# Patient Record
Sex: FEMALE | Race: WHITE | NOT HISPANIC OR LATINO | Employment: UNEMPLOYED | RURAL
[De-identification: names, ages, dates, MRNs, and addresses within clinical notes are randomized per-mention and may not be internally consistent; named-entity substitution may affect disease eponyms.]

---

## 2017-02-16 RX ORDER — AMITRIPTYLINE HYDROCHLORIDE 25 MG/1
25 TABLET, FILM COATED ORAL NIGHTLY
Qty: 90 TABLET | Refills: 1 | Status: SHIPPED | OUTPATIENT
Start: 2017-02-16 | End: 2017-08-07 | Stop reason: SDUPTHER

## 2017-02-16 RX ORDER — PROPRANOLOL HYDROCHLORIDE 40 MG/1
40 TABLET ORAL DAILY
Qty: 90 TABLET | Refills: 1 | Status: SHIPPED | OUTPATIENT
Start: 2017-02-16 | End: 2017-08-07 | Stop reason: SDUPTHER

## 2017-03-27 RX ORDER — PREDNISONE 10 MG/1
10 TABLET ORAL DAILY
Qty: 5 TABLET | Refills: 0 | Status: SHIPPED | OUTPATIENT
Start: 2017-03-27 | End: 2017-04-17 | Stop reason: SDUPTHER

## 2017-03-27 RX ORDER — AZITHROMYCIN 250 MG/1
TABLET, FILM COATED ORAL
Qty: 6 TABLET | Refills: 0 | Status: SHIPPED | OUTPATIENT
Start: 2017-03-27 | End: 2017-04-17 | Stop reason: SDUPTHER

## 2017-04-14 DIAGNOSIS — Z13.1 ENCOUNTER FOR SCREENING FOR DIABETES MELLITUS: Primary | ICD-10-CM

## 2017-04-14 DIAGNOSIS — R07.9 CHEST PAIN, UNSPECIFIED TYPE: Primary | ICD-10-CM

## 2017-04-14 PROCEDURE — 93010 ELECTROCARDIOGRAM REPORT: CPT | Performed by: INTERNAL MEDICINE

## 2017-04-14 PROCEDURE — 93005 ELECTROCARDIOGRAM TRACING: CPT | Performed by: FAMILY MEDICINE

## 2017-04-14 RX ORDER — LISINOPRIL AND HYDROCHLOROTHIAZIDE 12.5; 1 MG/1; MG/1
1 TABLET ORAL DAILY
Qty: 30 TABLET | Refills: 11 | Status: SHIPPED | OUTPATIENT
Start: 2017-04-14 | End: 2017-05-03

## 2017-04-17 LAB
ALBUMIN SERPL-MCNC: 4 G/DL (ref 3.4–4.8)
ALBUMIN/GLOB SERPL: 1.4 G/DL (ref 1.1–1.8)
ALP SERPL-CCNC: 119 U/L (ref 38–126)
ALT SERPL W P-5'-P-CCNC: 22 U/L (ref 9–52)
ANION GAP SERPL CALCULATED.3IONS-SCNC: 9 MMOL/L (ref 5–15)
ARTICHOKE IGE QN: 128 MG/DL (ref 1–129)
AST SERPL-CCNC: 17 U/L (ref 14–36)
BASOPHILS # BLD AUTO: 0.03 10*3/MM3 (ref 0–0.2)
BASOPHILS NFR BLD AUTO: 0.2 % (ref 0–2)
BILIRUB SERPL-MCNC: 0.5 MG/DL (ref 0.2–1.3)
BUN BLD-MCNC: 16 MG/DL (ref 7–21)
BUN/CREAT SERPL: 18.4 (ref 7–25)
CALCIUM SPEC-SCNC: 9.5 MG/DL (ref 8.4–10.2)
CHLORIDE SERPL-SCNC: 98 MMOL/L (ref 95–110)
CHOLEST SERPL-MCNC: 218 MG/DL (ref 0–199)
CO2 SERPL-SCNC: 28 MMOL/L (ref 22–31)
CREAT BLD-MCNC: 0.87 MG/DL (ref 0.5–1)
DEPRECATED RDW RBC AUTO: 46.8 FL (ref 36.4–46.3)
EOSINOPHIL # BLD AUTO: 0.15 10*3/MM3 (ref 0–0.7)
EOSINOPHIL NFR BLD AUTO: 1.1 % (ref 0–7)
ERYTHROCYTE [DISTWIDTH] IN BLOOD BY AUTOMATED COUNT: 13.8 % (ref 11.5–14.5)
GFR SERPL CREATININE-BSD FRML MDRD: 66 ML/MIN/1.73 (ref 45–104)
GLOBULIN UR ELPH-MCNC: 2.8 GM/DL (ref 2.3–3.5)
GLUCOSE BLD-MCNC: 98 MG/DL (ref 60–100)
HBA1C MFR BLD: 4.81 % (ref 4–5.6)
HCT VFR BLD AUTO: 39.8 % (ref 35–45)
HDLC SERPL-MCNC: 53 MG/DL (ref 60–200)
HGB BLD-MCNC: 13.4 G/DL (ref 12–15.5)
IMM GRANULOCYTES # BLD: 0.02 10*3/MM3 (ref 0–0.02)
IMM GRANULOCYTES NFR BLD: 0.2 % (ref 0–0.5)
LDLC/HDLC SERPL: 2.71 {RATIO} (ref 0–3.22)
LYMPHOCYTES # BLD AUTO: 3.03 10*3/MM3 (ref 0.6–4.2)
LYMPHOCYTES NFR BLD AUTO: 23.1 % (ref 10–50)
MCH RBC QN AUTO: 31.2 PG (ref 26.5–34)
MCHC RBC AUTO-ENTMCNC: 33.7 G/DL (ref 31.4–36)
MCV RBC AUTO: 92.6 FL (ref 80–98)
MONOCYTES # BLD AUTO: 1.22 10*3/MM3 (ref 0–0.9)
MONOCYTES NFR BLD AUTO: 9.3 % (ref 0–12)
NEUTROPHILS # BLD AUTO: 8.64 10*3/MM3 (ref 2–8.6)
NEUTROPHILS NFR BLD AUTO: 66.1 % (ref 37–80)
PLATELET # BLD AUTO: 312 10*3/MM3 (ref 150–450)
PMV BLD AUTO: 10.1 FL (ref 8–12)
POTASSIUM BLD-SCNC: 4.3 MMOL/L (ref 3.5–5.1)
PROT SERPL-MCNC: 6.8 G/DL (ref 6.3–8.6)
RBC # BLD AUTO: 4.3 10*6/MM3 (ref 3.77–5.16)
SODIUM BLD-SCNC: 135 MMOL/L (ref 137–145)
TRIGL SERPL-MCNC: 106 MG/DL (ref 20–199)
TSH SERPL DL<=0.05 MIU/L-ACNC: 1.24 MIU/ML (ref 0.46–4.68)
WBC NRBC COR # BLD: 13.09 10*3/MM3 (ref 3.2–9.8)

## 2017-04-17 PROCEDURE — 85025 COMPLETE CBC W/AUTO DIFF WBC: CPT | Performed by: FAMILY MEDICINE

## 2017-04-17 PROCEDURE — 83036 HEMOGLOBIN GLYCOSYLATED A1C: CPT | Performed by: FAMILY MEDICINE

## 2017-04-17 PROCEDURE — 80053 COMPREHEN METABOLIC PANEL: CPT | Performed by: FAMILY MEDICINE

## 2017-04-17 PROCEDURE — 84443 ASSAY THYROID STIM HORMONE: CPT | Performed by: FAMILY MEDICINE

## 2017-04-17 PROCEDURE — 80061 LIPID PANEL: CPT | Performed by: FAMILY MEDICINE

## 2017-04-17 RX ORDER — AZITHROMYCIN 250 MG/1
TABLET, FILM COATED ORAL
Qty: 11 TABLET | Refills: 0 | Status: SHIPPED | OUTPATIENT
Start: 2017-04-17 | End: 2017-05-26

## 2017-04-17 RX ORDER — PREDNISONE 10 MG/1
10 TABLET ORAL DAILY
Qty: 5 TABLET | Refills: 0 | Status: SHIPPED | OUTPATIENT
Start: 2017-04-17 | End: 2017-11-20

## 2017-04-17 RX ORDER — MONTELUKAST SODIUM 10 MG/1
10 TABLET ORAL NIGHTLY
Qty: 30 TABLET | Refills: 11 | Status: SHIPPED | OUTPATIENT
Start: 2017-04-17 | End: 2017-06-19 | Stop reason: SDUPTHER

## 2017-04-17 RX ORDER — CETIRIZINE HYDROCHLORIDE 10 MG/1
10 TABLET ORAL DAILY
Qty: 30 TABLET | Refills: 11 | Status: SHIPPED | OUTPATIENT
Start: 2017-04-17 | End: 2018-10-04

## 2017-05-03 RX ORDER — LOSARTAN POTASSIUM 25 MG/1
25 TABLET ORAL DAILY
Qty: 30 TABLET | Refills: 11 | Status: SHIPPED | OUTPATIENT
Start: 2017-05-03 | End: 2017-05-30 | Stop reason: SDUPTHER

## 2017-05-03 RX ORDER — SIMVASTATIN 20 MG
20 TABLET ORAL NIGHTLY
Qty: 30 TABLET | Refills: 11 | Status: SHIPPED | OUTPATIENT
Start: 2017-05-03 | End: 2017-05-30 | Stop reason: SDUPTHER

## 2017-05-26 RX ORDER — ALBUTEROL SULFATE 90 UG/1
2 AEROSOL, METERED RESPIRATORY (INHALATION) EVERY 4 HOURS PRN
Qty: 1 INHALER | Refills: 11 | Status: SHIPPED | OUTPATIENT
Start: 2017-05-26 | End: 2019-09-16 | Stop reason: SDUPTHER

## 2017-05-30 RX ORDER — SIMVASTATIN 20 MG
20 TABLET ORAL NIGHTLY
Qty: 90 TABLET | Refills: 4 | Status: SHIPPED | OUTPATIENT
Start: 2017-05-30 | End: 2018-08-16 | Stop reason: SDUPTHER

## 2017-05-30 RX ORDER — LOSARTAN POTASSIUM 25 MG/1
25 TABLET ORAL DAILY
Qty: 90 TABLET | Refills: 4 | Status: SHIPPED | OUTPATIENT
Start: 2017-05-30 | End: 2018-07-31 | Stop reason: SDUPTHER

## 2017-06-19 RX ORDER — MONTELUKAST SODIUM 10 MG/1
10 TABLET ORAL NIGHTLY
Qty: 90 TABLET | Refills: 6 | Status: SHIPPED | OUTPATIENT
Start: 2017-06-19 | End: 2018-09-21 | Stop reason: SDUPTHER

## 2017-08-01 ENCOUNTER — OFFICE VISIT (OUTPATIENT)
Dept: FAMILY MEDICINE CLINIC | Facility: CLINIC | Age: 62
End: 2017-08-01

## 2017-08-01 ENCOUNTER — LAB (OUTPATIENT)
Dept: LAB | Facility: CLINIC | Age: 62
End: 2017-08-01

## 2017-08-01 DIAGNOSIS — R10.30 PAIN IN THE GROIN, UNSPECIFIED LATERALITY: Primary | ICD-10-CM

## 2017-08-01 DIAGNOSIS — Z12.11 ENCOUNTER FOR SCREENING FOR MALIGNANT NEOPLASM OF COLON: Primary | ICD-10-CM

## 2017-08-01 DIAGNOSIS — R10.30 LOWER ABDOMINAL PAIN: ICD-10-CM

## 2017-08-01 DIAGNOSIS — R10.30 LOWER ABDOMINAL PAIN: Primary | ICD-10-CM

## 2017-08-01 PROCEDURE — 83605 ASSAY OF LACTIC ACID: CPT | Performed by: FAMILY MEDICINE

## 2017-08-01 PROCEDURE — 85651 RBC SED RATE NONAUTOMATED: CPT | Performed by: FAMILY MEDICINE

## 2017-08-01 PROCEDURE — 36415 COLL VENOUS BLD VENIPUNCTURE: CPT | Performed by: FAMILY MEDICINE

## 2017-08-01 PROCEDURE — 99213 OFFICE O/P EST LOW 20 MIN: CPT | Performed by: FAMILY MEDICINE

## 2017-08-01 PROCEDURE — 80053 COMPREHEN METABOLIC PANEL: CPT | Performed by: FAMILY MEDICINE

## 2017-08-01 PROCEDURE — 86140 C-REACTIVE PROTEIN: CPT | Performed by: FAMILY MEDICINE

## 2017-08-01 PROCEDURE — 85025 COMPLETE CBC W/AUTO DIFF WBC: CPT | Performed by: FAMILY MEDICINE

## 2017-08-01 NOTE — PATIENT INSTRUCTIONS
Diverticulosis  Diverticulosis is the condition that develops when small pouches (diverticula) form in the wall of your colon. Your colon, or large intestine, is where water is absorbed and stool is formed. The pouches form when the inside layer of your colon pushes through weak spots in the outer layers of your colon.  CAUSES   No one knows exactly what causes diverticulosis.  RISK FACTORS  · Being older than 50. Your risk for this condition increases with age. Diverticulosis is rare in people younger than 40 years. By age 80, almost everyone has it.  · Eating a low-fiber diet.  · Being frequently constipated.  · Being overweight.  · Not getting enough exercise.  · Smoking.  · Taking over-the-counter pain medicines, like aspirin and ibuprofen.  SYMPTOMS   Most people with diverticulosis do not have symptoms.  DIAGNOSIS   Because diverticulosis often has no symptoms, health care providers often discover the condition during an exam for other colon problems. In many cases, a health care provider will diagnose diverticulosis while using a flexible scope to examine the colon (colonoscopy).  TREATMENT   If you have never developed an infection related to diverticulosis, you may not need treatment. If you have had an infection before, treatment may include:  · Eating more fruits, vegetables, and grains.  · Taking a fiber supplement.  · Taking a live bacteria supplement (probiotic).  · Taking medicine to relax your colon.  HOME CARE INSTRUCTIONS   · Drink at least 6-8 glasses of water each day to prevent constipation.  · Try not to strain when you have a bowel movement.  · Keep all follow-up appointments.  If you have had an infection before:   · Increase the fiber in your diet as directed by your health care provider or dietitian.  · Take a dietary fiber supplement if your health care provider approves.  · Only take medicines as directed by your health care provider.  SEEK MEDICAL CARE IF:   · You have abdominal  pain.  · You have bloating.  · You have cramps.  · You have not gone to the bathroom in 3 days.  SEEK IMMEDIATE MEDICAL CARE IF:   · Your pain gets worse.  · Your bloating becomes very bad.  · You have a fever or chills, and your symptoms suddenly get worse.  · You begin vomiting.  · You have bowel movements that are bloody or black.  MAKE SURE YOU:  · Understand these instructions.  · Will watch your condition.  · Will get help right away if you are not doing well or get worse.     This information is not intended to replace advice given to you by your health care provider. Make sure you discuss any questions you have with your health care provider.     Document Released: 09/14/2005 Document Revised: 12/23/2014 Document Reviewed: 11/12/2014  [employer] Interactive Patient Education ©2017 [employer]    Diverticulitis  Diverticulitis is when small pockets that have formed in your colon (large intestine) become infected or swollen.  HOME CARE  · Follow your doctor's instructions.  · Follow a special diet if told by your doctor.  · When you feel better, your doctor may tell you to change your diet. You may be told to eat a lot of fiber. Fruits and vegetables are good sources of fiber. Fiber makes it easier to poop (have bowel movements).  · Take supplements or probiotics as told by your doctor.  · Only take medicines as told by your doctor.  · Keep all follow-up visits with your doctor.  GET HELP IF:  · Your pain does not get better.  · You have a hard time eating food.  · You are not pooping like normal.  GET HELP RIGHT AWAY IF:  · Your pain gets worse.  · Your problems do not get better.  · Your problems suddenly get worse.  · You have a fever.  · You keep throwing up (vomiting).  · You have bloody or black, tarry poop (stool).  MAKE SURE YOU:   · Understand these instructions.  · Will watch your condition.  · Will get help right away if you are not doing well or get worse.     This information is not intended to  replace advice given to you by your health care provider. Make sure you discuss any questions you have with your health care provider.     Document Released: 06/05/2009 Document Revised: 12/23/2014 Document Reviewed: 11/12/2014  [employer] Interactive Patient Education ©2017 [employer]

## 2017-08-02 DIAGNOSIS — R10.30 LOWER ABDOMINAL PAIN: Primary | ICD-10-CM

## 2017-08-02 LAB
ALBUMIN SERPL-MCNC: 3.7 G/DL (ref 3.4–4.8)
ALBUMIN/GLOB SERPL: 1.3 G/DL (ref 1.1–1.8)
ALP SERPL-CCNC: 141 U/L (ref 38–126)
ALT SERPL W P-5'-P-CCNC: 27 U/L (ref 9–52)
ANION GAP SERPL CALCULATED.3IONS-SCNC: 11 MMOL/L (ref 5–15)
AST SERPL-CCNC: 15 U/L (ref 14–36)
BASOPHILS # BLD AUTO: 0.05 10*3/MM3 (ref 0–0.2)
BASOPHILS NFR BLD AUTO: 0.4 % (ref 0–2)
BILIRUB SERPL-MCNC: 0.3 MG/DL (ref 0.2–1.3)
BUN BLD-MCNC: 12 MG/DL (ref 7–21)
BUN/CREAT SERPL: 12.8 (ref 7–25)
CALCIUM SPEC-SCNC: 9.8 MG/DL (ref 8.4–10.2)
CHLORIDE SERPL-SCNC: 98 MMOL/L (ref 95–110)
CO2 SERPL-SCNC: 28 MMOL/L (ref 22–31)
CREAT BLD-MCNC: 0.94 MG/DL (ref 0.5–1)
CRP SERPL-MCNC: 0.9 MG/DL (ref 0–1)
D-LACTATE SERPL-SCNC: 1.3 MMOL/L (ref 0.5–2)
DEPRECATED RDW RBC AUTO: 42.9 FL (ref 36.4–46.3)
EOSINOPHIL # BLD AUTO: 0.27 10*3/MM3 (ref 0–0.7)
EOSINOPHIL NFR BLD AUTO: 2 % (ref 0–7)
ERYTHROCYTE [DISTWIDTH] IN BLOOD BY AUTOMATED COUNT: 12.5 % (ref 11.5–14.5)
ERYTHROCYTE [SEDIMENTATION RATE] IN BLOOD: 11 MM/HR (ref 0–20)
GFR SERPL CREATININE-BSD FRML MDRD: 60 ML/MIN/1.73 (ref 45–104)
GLOBULIN UR ELPH-MCNC: 2.8 GM/DL (ref 2.3–3.5)
GLUCOSE BLD-MCNC: 116 MG/DL (ref 60–100)
HCT VFR BLD AUTO: 42.3 % (ref 35–45)
HGB BLD-MCNC: 13.8 G/DL (ref 12–15.5)
IMM GRANULOCYTES # BLD: 0.06 10*3/MM3 (ref 0–0.02)
IMM GRANULOCYTES NFR BLD: 0.5 % (ref 0–0.5)
LYMPHOCYTES # BLD AUTO: 3.46 10*3/MM3 (ref 0.6–4.2)
LYMPHOCYTES NFR BLD AUTO: 26 % (ref 10–50)
MCH RBC QN AUTO: 30.9 PG (ref 26.5–34)
MCHC RBC AUTO-ENTMCNC: 32.6 G/DL (ref 31.4–36)
MCV RBC AUTO: 94.6 FL (ref 80–98)
MONOCYTES # BLD AUTO: 0.75 10*3/MM3 (ref 0–0.9)
MONOCYTES NFR BLD AUTO: 5.6 % (ref 0–12)
NEUTROPHILS # BLD AUTO: 8.74 10*3/MM3 (ref 2–8.6)
NEUTROPHILS NFR BLD AUTO: 65.5 % (ref 37–80)
PLATELET # BLD AUTO: 289 10*3/MM3 (ref 150–450)
PMV BLD AUTO: 11 FL (ref 8–12)
POTASSIUM BLD-SCNC: 3.7 MMOL/L (ref 3.5–5.1)
PROT SERPL-MCNC: 6.5 G/DL (ref 6.3–8.6)
RBC # BLD AUTO: 4.47 10*6/MM3 (ref 3.77–5.16)
SODIUM BLD-SCNC: 137 MMOL/L (ref 137–145)
WBC NRBC COR # BLD: 13.33 10*3/MM3 (ref 3.2–9.8)

## 2017-08-02 NOTE — PROGRESS NOTES
Subjective   Jenn Lee is a 62 y.o. female.     Problem List  1. Essential Hypertension  2. Hyperlipidemia ASCVD risk >5%  3. Tobacco smoker  4. Chronic cough  5. Allergic Rhinitis   6. Leukocytosis  7. Possible COPD  8. Old compression fracture of lower thoracic vertebral body.     PSHx    Family Hx  -family history of cancer  -heart disease    Social Hx  Tobacco smoker    Pt is 61 yo WF with the above medical issues. Is here today with CC of lower abdominal pain that has been present for past few days. Pt states she has lower cramping abdominal pain that is intermittent. Pt also states she has constipation.  She has had subjective fever, chills, weakness, fatigue. Pt may have ate something that may have gotten sick. Denies blood in stools but states there is mucous. Pt's last colonoscopy >10 years ago. Do not have last report currently. Pt has not had abnormal colonoscopy in the past.  She is of average risk for colon cancer.        Abdominal Pain   This is a new problem. The current episode started in the past 7 days. The onset quality is sudden. The problem occurs daily. The problem has been unchanged. The pain is located in the RLQ and LLQ. The pain is at a severity of 6/10. The pain is moderate. The quality of the pain is aching, cramping and a sensation of fullness. Associated symptoms include constipation. Pertinent negatives include no anorexia, arthralgias, belching, diarrhea, dysuria, fever, flatus, frequency, headaches, hematochezia, hematuria, melena, myalgias, nausea, vomiting or weight loss. Nothing aggravates the pain. The pain is relieved by nothing. She has tried nothing for the symptoms. The treatment provided no relief. There is no history of abdominal surgery, colon cancer, Crohn's disease, gallstones, GERD, irritable bowel syndrome, pancreatitis, PUD or ulcerative colitis.      The following portions of the patient's history were reviewed and updated as appropriate: allergies, current  medications, past family history, past medical history, past social history, past surgical history and problem list.    Review of Systems   Constitutional: Negative.  Negative for fever and weight loss.   HENT: Negative.    Eyes: Negative.    Respiratory: Negative.    Cardiovascular: Negative.    Gastrointestinal: Positive for abdominal pain and constipation. Negative for anorexia, diarrhea, flatus, hematochezia, melena, nausea and vomiting.   Endocrine: Negative.    Genitourinary: Negative.  Negative for dysuria, frequency and hematuria.   Musculoskeletal: Negative.  Negative for arthralgias and myalgias.   Skin: Negative.    Allergic/Immunologic: Negative.    Neurological: Negative.  Negative for headaches.   Hematological: Negative.    Psychiatric/Behavioral: Negative.        Objective    There were no vitals taken for this visit.    Chemistry        Component Value Date/Time     (L) 04/17/2017 0808    K 4.3 04/17/2017 0808    CL 98 04/17/2017 0808    CO2 28.0 04/17/2017 0808    BUN 16 04/17/2017 0808    CREATININE 0.87 04/17/2017 0808        Component Value Date/Time    CALCIUM 9.5 04/17/2017 0808    ALKPHOS 119 04/17/2017 0808    AST 17 04/17/2017 0808    ALT 22 04/17/2017 0808    BILITOT 0.5 04/17/2017 0808        Lab Results   Component Value Date    WBC 13.09 (H) 04/17/2017    HGB 13.4 04/17/2017    HCT 39.8 04/17/2017    MCV 92.6 04/17/2017     04/17/2017     Orders Only on 04/14/2017   Component Date Value Ref Range Status   • WBC 04/17/2017 13.09* 3.20 - 9.80 10*3/mm3 Final   • RBC 04/17/2017 4.30  3.77 - 5.16 10*6/mm3 Final   • Hemoglobin 04/17/2017 13.4  12.0 - 15.5 g/dL Final   • Hematocrit 04/17/2017 39.8  35.0 - 45.0 % Final   • MCV 04/17/2017 92.6  80.0 - 98.0 fL Final   • MCH 04/17/2017 31.2  26.5 - 34.0 pg Final   • MCHC 04/17/2017 33.7  31.4 - 36.0 g/dL Final   • RDW 04/17/2017 13.8  11.5 - 14.5 % Final   • RDW-SD 04/17/2017 46.8* 36.4 - 46.3 fl Final   • MPV 04/17/2017 10.1  8.0 -  12.0 fL Final   • Platelets 04/17/2017 312  150 - 450 10*3/mm3 Final   • Neutrophil % 04/17/2017 66.1  37.0 - 80.0 % Final   • Lymphocyte % 04/17/2017 23.1  10.0 - 50.0 % Final   • Monocyte % 04/17/2017 9.3  0.0 - 12.0 % Final   • Eosinophil % 04/17/2017 1.1  0.0 - 7.0 % Final   • Basophil % 04/17/2017 0.2  0.0 - 2.0 % Final   • Immature Grans % 04/17/2017 0.2  0.0 - 0.5 % Final   • Neutrophils, Absolute 04/17/2017 8.64* 2.00 - 8.60 10*3/mm3 Final   • Lymphocytes, Absolute 04/17/2017 3.03  0.60 - 4.20 10*3/mm3 Final   • Monocytes, Absolute 04/17/2017 1.22* 0.00 - 0.90 10*3/mm3 Final   • Eosinophils, Absolute 04/17/2017 0.15  0.00 - 0.70 10*3/mm3 Final   • Basophils, Absolute 04/17/2017 0.03  0.00 - 0.20 10*3/mm3 Final   • Immature Grans, Absolute 04/17/2017 0.02  0.00 - 0.02 10*3/mm3 Final   • Glucose 04/17/2017 98  60 - 100 mg/dL Final   • BUN 04/17/2017 16  7 - 21 mg/dL Final   • Creatinine 04/17/2017 0.87  0.50 - 1.00 mg/dL Final   • Sodium 04/17/2017 135* 137 - 145 mmol/L Final   • Potassium 04/17/2017 4.3  3.5 - 5.1 mmol/L Final   • Chloride 04/17/2017 98  95 - 110 mmol/L Final   • CO2 04/17/2017 28.0  22.0 - 31.0 mmol/L Final   • Calcium 04/17/2017 9.5  8.4 - 10.2 mg/dL Final   • Total Protein 04/17/2017 6.8  6.3 - 8.6 g/dL Final   • Albumin 04/17/2017 4.00  3.40 - 4.80 g/dL Final   • ALT (SGPT) 04/17/2017 22  9 - 52 U/L Final   • AST (SGOT) 04/17/2017 17  14 - 36 U/L Final   • Alkaline Phosphatase 04/17/2017 119  38 - 126 U/L Final   • Total Bilirubin 04/17/2017 0.5  0.2 - 1.3 mg/dL Final   • eGFR Non African Amer 04/17/2017 66  45 - 104 mL/min/1.73 Final   • Globulin 04/17/2017 2.8  2.3 - 3.5 gm/dL Final   • A/G Ratio 04/17/2017 1.4  1.1 - 1.8 g/dL Final   • BUN/Creatinine Ratio 04/17/2017 18.4  7.0 - 25.0 Final   • Anion Gap 04/17/2017 9.0  5.0 - 15.0 mmol/L Final   • Hemoglobin A1C 04/17/2017 4.81  4 - 5.6 % Final   • Total Cholesterol 04/17/2017 218* 0 - 199 mg/dL Final   • Triglycerides 04/17/2017 106   20 - 199 mg/dL Final   • HDL Cholesterol 04/17/2017 53* 60 - 200 mg/dL Final   • LDL Cholesterol  04/17/2017 128  1 - 129 mg/dL Final   • LDL/HDL Ratio 04/17/2017 2.71  0.00 - 3.22 Final   • TSH 04/17/2017 1.240  0.460 - 4.680 mIU/mL Final       Physical Exam   Constitutional: She is oriented to person, place, and time. She appears well-developed and well-nourished. No distress.   HENT:   Head: Normocephalic and atraumatic.   Right Ear: External ear normal.   Left Ear: External ear normal.   Eyes: Conjunctivae and EOM are normal. Pupils are equal, round, and reactive to light. Right eye exhibits no discharge. Left eye exhibits no discharge. No scleral icterus.   Neck: Normal range of motion. Neck supple. No JVD present. No tracheal deviation present. No thyromegaly present.   Cardiovascular: Normal rate, regular rhythm and normal heart sounds.    Pulmonary/Chest: Effort normal and breath sounds normal. No stridor. No respiratory distress. She has no wheezes.   Abdominal: Soft. Bowel sounds are normal. She exhibits no distension, no pulsatile liver, no ascites and no mass. There is tenderness in the right lower quadrant and left lower quadrant. There is no rebound, no guarding, no tenderness at McBurney's point and negative Rowell's sign. No hernia.   Lower abdominal tenderness on deep palpation.  No guarding no distention no rigidity  -bowel sounds hyperactive   Musculoskeletal: Normal range of motion. She exhibits edema. She exhibits no tenderness or deformity.   Neurological: She is alert and oriented to person, place, and time. She has normal reflexes. No cranial nerve deficit. Coordination normal.   Skin: Skin is warm and dry. No rash noted. She is not diaphoretic. No erythema. No pallor.   Psychiatric: She has a normal mood and affect.   Nursing note and vitals reviewed.      Assessment/Plan   Problems Addressed this Visit        Nervous and Auditory    Lower abdominal pain - Primary      - will get Abdominal  x-ray. Along with labwork such as cbc, cmp, esr,crp and lactic acid level.  - will also get Gastrointestinal PCR, stool study  - possible diverticulitis vs ischemic bowel vs constipation  - will refer to Gastroenterologist Dr. Cr for colonoscopy screening. Consultation appreciated.    - Addendum 10/23/17.  Pt has declined colonoscopy at this time and would prefer cologuard test instead. She will sign form.

## 2017-08-03 DIAGNOSIS — R10.30 LOWER ABDOMINAL PAIN: Primary | ICD-10-CM

## 2017-08-03 LAB
ADV 40+41 DNA STL QL NAA+NON-PROBE: NOT DETECTED
ASTRO TYP 1-8 RNA STL QL NAA+NON-PROBE: NOT DETECTED
C CAYETANENSIS DNA STL QL NAA+NON-PROBE: NOT DETECTED
C DIFF TOX GENS STL QL NAA+PROBE: NORMAL
CAMPY SP DNA.DIARRHEA STL QL NAA+PROBE: NOT DETECTED
CRYPTOSP STL CULT: NOT DETECTED
E COLI DNA SPEC QL NAA+PROBE: NOT DETECTED
E HISTOLYT AG STL-ACNC: NOT DETECTED
EAEC PAA PLAS AGGR+AATA ST NAA+NON-PRB: NOT DETECTED
EC STX1+STX2 GENES STL QL NAA+NON-PROBE: NOT DETECTED
EPEC EAE GENE STL QL NAA+NON-PROBE: NOT DETECTED
ETEC LTA+ST1A+ST1B TOX ST NAA+NON-PROBE: NOT DETECTED
G LAMBLIA DNA SPEC QL NAA+PROBE: NOT DETECTED
HEMOCCULT STL QL: NEGATIVE
LACTOFERRIN STL QL LA: NEGATIVE
NOROVIRUS GI+II RNA STL QL NAA+NON-PROBE: NOT DETECTED
P SHIGELLOIDES DNA STL QL NAA+NON-PROBE: NOT DETECTED
RV RNA STL NAA+PROBE: NOT DETECTED
SALMONELLA DNA SPEC QL NAA+PROBE: NOT DETECTED
SAPO I+II+IV+V RNA STL QL NAA+NON-PROBE: NOT DETECTED
SHIGELLA SP+EIEC IPAH ST NAA+NON-PROBE: NOT DETECTED
V CHOLERAE DNA SPEC QL NAA+PROBE: NOT DETECTED
VIBRIO DNA SPEC NAA+PROBE: NOT DETECTED
YERSINIA STL CULT: NOT DETECTED

## 2017-08-03 PROCEDURE — 87338 HPYLORI STOOL AG IA: CPT | Performed by: FAMILY MEDICINE

## 2017-08-03 PROCEDURE — 82272 OCCULT BLD FECES 1-3 TESTS: CPT | Performed by: FAMILY MEDICINE

## 2017-08-03 PROCEDURE — 87507 IADNA-DNA/RNA PROBE TQ 12-25: CPT | Performed by: FAMILY MEDICINE

## 2017-08-03 PROCEDURE — 83631 LACTOFERRIN FECAL (QUANT): CPT | Performed by: FAMILY MEDICINE

## 2017-08-06 LAB — H PYLORI AG STL QL IA: NEGATIVE

## 2017-08-07 RX ORDER — AMITRIPTYLINE HYDROCHLORIDE 25 MG/1
25 TABLET, FILM COATED ORAL NIGHTLY
Qty: 90 TABLET | Refills: 6 | Status: SHIPPED | OUTPATIENT
Start: 2017-08-07 | End: 2018-02-28

## 2017-08-07 RX ORDER — PROPRANOLOL HYDROCHLORIDE 40 MG/1
40 TABLET ORAL DAILY
Qty: 90 TABLET | Refills: 6 | Status: SHIPPED | OUTPATIENT
Start: 2017-08-07 | End: 2018-10-28 | Stop reason: SDUPTHER

## 2017-10-20 RX ORDER — AMOXICILLIN AND CLAVULANATE POTASSIUM 875; 125 MG/1; MG/1
1 TABLET, FILM COATED ORAL 2 TIMES DAILY
Qty: 20 TABLET | Refills: 0 | Status: SHIPPED | OUTPATIENT
Start: 2017-10-20 | End: 2017-11-20

## 2017-10-20 RX ORDER — PREDNISONE 10 MG/1
TABLET ORAL
Qty: 11 TABLET | Refills: 0 | Status: SHIPPED | OUTPATIENT
Start: 2017-10-20 | End: 2017-11-20 | Stop reason: SDUPTHER

## 2017-11-20 RX ORDER — PREDNISONE 10 MG/1
TABLET ORAL
Qty: 30 TABLET | Refills: 0 | Status: SHIPPED | OUTPATIENT
Start: 2017-11-20 | End: 2018-02-28

## 2017-11-20 RX ORDER — CEFDINIR 300 MG/1
300 CAPSULE ORAL 2 TIMES DAILY
Qty: 20 CAPSULE | Refills: 0 | Status: SHIPPED | OUTPATIENT
Start: 2017-11-20 | End: 2017-11-30

## 2018-02-16 DIAGNOSIS — J11.1 INFLUENZA: Primary | ICD-10-CM

## 2018-02-16 LAB
ALBUMIN SERPL-MCNC: 4 G/DL (ref 3.4–4.8)
ALBUMIN/GLOB SERPL: 1.4 G/DL (ref 1.1–1.8)
ALP SERPL-CCNC: 120 U/L (ref 38–126)
ALT SERPL W P-5'-P-CCNC: 26 U/L (ref 9–52)
ANION GAP SERPL CALCULATED.3IONS-SCNC: 12 MMOL/L (ref 5–15)
AST SERPL-CCNC: 20 U/L (ref 14–36)
BASOPHILS # BLD AUTO: 0.03 10*3/MM3 (ref 0–0.2)
BASOPHILS NFR BLD AUTO: 0.3 % (ref 0–2)
BILIRUB SERPL-MCNC: 0.2 MG/DL (ref 0.2–1.3)
BUN BLD-MCNC: 16 MG/DL (ref 7–21)
BUN/CREAT SERPL: 20.3 (ref 7–25)
CALCIUM SPEC-SCNC: 9.2 MG/DL (ref 8.4–10.2)
CHLORIDE SERPL-SCNC: 100 MMOL/L (ref 95–110)
CO2 SERPL-SCNC: 25 MMOL/L (ref 22–31)
CREAT BLD-MCNC: 0.79 MG/DL (ref 0.5–1)
CRP SERPL-MCNC: <0.5 MG/DL (ref 0–1)
DEPRECATED RDW RBC AUTO: 46 FL (ref 36.4–46.3)
EOSINOPHIL # BLD AUTO: 0.25 10*3/MM3 (ref 0–0.7)
EOSINOPHIL NFR BLD AUTO: 2.2 % (ref 0–7)
ERYTHROCYTE [DISTWIDTH] IN BLOOD BY AUTOMATED COUNT: 13 % (ref 11.5–14.5)
ERYTHROCYTE [SEDIMENTATION RATE] IN BLOOD: 16 MM/HR (ref 0–20)
GFR SERPL CREATININE-BSD FRML MDRD: 74 ML/MIN/1.73 (ref 45–104)
GLOBULIN UR ELPH-MCNC: 2.8 GM/DL (ref 2.3–3.5)
GLUCOSE BLD-MCNC: 89 MG/DL (ref 60–100)
HCT VFR BLD AUTO: 42.6 % (ref 35–45)
HGB BLD-MCNC: 14 G/DL (ref 12–15.5)
IMM GRANULOCYTES # BLD: 0.03 10*3/MM3 (ref 0–0.02)
IMM GRANULOCYTES NFR BLD: 0.3 % (ref 0–0.5)
LYMPHOCYTES # BLD AUTO: 3.01 10*3/MM3 (ref 0.6–4.2)
LYMPHOCYTES NFR BLD AUTO: 26.5 % (ref 10–50)
MCH RBC QN AUTO: 31.8 PG (ref 26.5–34)
MCHC RBC AUTO-ENTMCNC: 32.9 G/DL (ref 31.4–36)
MCV RBC AUTO: 96.8 FL (ref 80–98)
MONOCYTES # BLD AUTO: 0.8 10*3/MM3 (ref 0–0.9)
MONOCYTES NFR BLD AUTO: 7.1 % (ref 0–12)
NEUTROPHILS # BLD AUTO: 7.22 10*3/MM3 (ref 2–8.6)
NEUTROPHILS NFR BLD AUTO: 63.6 % (ref 37–80)
PLATELET # BLD AUTO: 274 10*3/MM3 (ref 150–450)
PMV BLD AUTO: 10.7 FL (ref 8–12)
POTASSIUM BLD-SCNC: 3.7 MMOL/L (ref 3.5–5.1)
PROT SERPL-MCNC: 6.8 G/DL (ref 6.3–8.6)
RBC # BLD AUTO: 4.4 10*6/MM3 (ref 3.77–5.16)
SODIUM BLD-SCNC: 137 MMOL/L (ref 137–145)
WBC NRBC COR # BLD: 11.34 10*3/MM3 (ref 3.2–9.8)

## 2018-02-16 PROCEDURE — 80053 COMPREHEN METABOLIC PANEL: CPT | Performed by: FAMILY MEDICINE

## 2018-02-16 PROCEDURE — 85025 COMPLETE CBC W/AUTO DIFF WBC: CPT | Performed by: FAMILY MEDICINE

## 2018-02-16 PROCEDURE — 86140 C-REACTIVE PROTEIN: CPT | Performed by: FAMILY MEDICINE

## 2018-02-16 PROCEDURE — 85651 RBC SED RATE NONAUTOMATED: CPT | Performed by: FAMILY MEDICINE

## 2018-02-16 PROCEDURE — 36415 COLL VENOUS BLD VENIPUNCTURE: CPT | Performed by: FAMILY MEDICINE

## 2018-02-16 RX ORDER — OSELTAMIVIR PHOSPHATE 75 MG/1
75 CAPSULE ORAL 2 TIMES DAILY
Qty: 10 CAPSULE | Refills: 0 | Status: SHIPPED | OUTPATIENT
Start: 2018-02-16 | End: 2018-02-21

## 2018-02-16 RX ORDER — DOXYCYCLINE HYCLATE 50 MG/1
50 CAPSULE ORAL EVERY 12 HOURS SCHEDULED
Qty: 14 CAPSULE | Refills: 0 | Status: SHIPPED | OUTPATIENT
Start: 2018-02-16 | End: 2018-02-28

## 2018-02-19 DIAGNOSIS — Z13.820 OSTEOPOROSIS SCREENING: Primary | ICD-10-CM

## 2018-02-19 DIAGNOSIS — S22.000A COMPRESSION FRACTURE OF BODY OF THORACIC VERTEBRA (HCC): ICD-10-CM

## 2018-02-19 DIAGNOSIS — M85.80 OSTEOPENIA, UNSPECIFIED LOCATION: ICD-10-CM

## 2018-02-28 ENCOUNTER — OFFICE VISIT (OUTPATIENT)
Dept: FAMILY MEDICINE CLINIC | Facility: CLINIC | Age: 63
End: 2018-02-28

## 2018-02-28 VITALS
SYSTOLIC BLOOD PRESSURE: 148 MMHG | TEMPERATURE: 98.7 F | DIASTOLIC BLOOD PRESSURE: 82 MMHG | BODY MASS INDEX: 20.35 KG/M2 | OXYGEN SATURATION: 98 % | HEIGHT: 62 IN | WEIGHT: 110.6 LBS | HEART RATE: 75 BPM

## 2018-02-28 DIAGNOSIS — G89.29 CHRONIC THORACIC BACK PAIN, UNSPECIFIED BACK PAIN LATERALITY: ICD-10-CM

## 2018-02-28 DIAGNOSIS — G43.809 OTHER MIGRAINE WITHOUT STATUS MIGRAINOSUS, NOT INTRACTABLE: ICD-10-CM

## 2018-02-28 DIAGNOSIS — Z12.39 ENCOUNTER FOR SCREENING FOR MALIGNANT NEOPLASM OF BREAST: Primary | ICD-10-CM

## 2018-02-28 DIAGNOSIS — Z72.0 TOBACCO USER: ICD-10-CM

## 2018-02-28 DIAGNOSIS — M54.6 CHRONIC THORACIC BACK PAIN, UNSPECIFIED BACK PAIN LATERALITY: ICD-10-CM

## 2018-02-28 DIAGNOSIS — S22.000S CLOSED COMPRESSION FRACTURE OF THORACIC VERTEBRA, SEQUELA: ICD-10-CM

## 2018-02-28 DIAGNOSIS — Z00.00 GENERAL MEDICAL EXAMINATION: Primary | ICD-10-CM

## 2018-02-28 DIAGNOSIS — M81.0 OSTEOPOROSIS, UNSPECIFIED OSTEOPOROSIS TYPE, UNSPECIFIED PATHOLOGICAL FRACTURE PRESENCE: ICD-10-CM

## 2018-02-28 PROBLEM — G43.909 MIGRAINE: Status: ACTIVE | Noted: 2018-02-28

## 2018-02-28 PROBLEM — S22.000A CLOSED COMPRESSION FRACTURE OF THORACIC VERTEBRA: Status: ACTIVE | Noted: 2018-02-28

## 2018-02-28 PROCEDURE — 99213 OFFICE O/P EST LOW 20 MIN: CPT | Performed by: FAMILY MEDICINE

## 2018-02-28 RX ORDER — AMITRIPTYLINE HYDROCHLORIDE 25 MG/1
50 TABLET, FILM COATED ORAL NIGHTLY
Qty: 60 TABLET | Refills: 3 | Status: SHIPPED | OUTPATIENT
Start: 2018-02-28 | End: 2018-06-18 | Stop reason: SDUPTHER

## 2018-02-28 NOTE — PATIENT INSTRUCTIONS
Referral to Dr. Gutierrez    Take 2 pills of elavil 25 mg = 50 mg then  new prescription. Take at bedtime     Alendronate tablets  What is this medicine?  ALENDRONATE (a TERRY droe joel) slows calcium loss from bones. It helps to make normal healthy bone and to slow bone loss in people with Paget's disease and osteoporosis. It may be used in others at risk for bone loss.  This medicine may be used for other purposes; ask your health care provider or pharmacist if you have questions.  COMMON BRAND NAME(S): Fosamax  What should I tell my health care provider before I take this medicine?  They need to know if you have any of these conditions:  -dental disease  -esophagus, stomach, or intestine problems, like acid reflux or GERD  -kidney disease  -low blood calcium  -low vitamin D  -problems sitting or standing 30 minutes  -trouble swallowing  -an unusual or allergic reaction to alendronate, other medicines, foods, dyes, or preservatives  -pregnant or trying to get pregnant  -breast-feeding  How should I use this medicine?  You must take this medicine exactly as directed or you will lower the amount of the medicine you absorb into your body or you may cause yourself harm. Take this medicine by mouth first thing in the morning, after you are up for the day. Do not eat or drink anything before you take your medicine. Swallow the tablet with a full glass (6 to 8 fluid ounces) of plain water. Do not take this medicine with any other drink. Do not chew or crush the tablet. After taking this medicine, do not eat breakfast, drink, or take any medicines or vitamins for at least 30 minutes. Sit or stand up for at least 30 minutes after you take this medicine; do not lie down. Do not take your medicine more often than directed.  Talk to your pediatrician regarding the use of this medicine in children. Special care may be needed.  Overdosage: If you think you have taken too much of this medicine contact a poison control  center or emergency room at once.  NOTE: This medicine is only for you. Do not share this medicine with others.  What if I miss a dose?  If you miss a dose, do not take it later in the day. Continue your normal schedule starting the next morning. Do not take double or extra doses.  What may interact with this medicine?  -aluminum hydroxide  -antacids  -aspirin  -calcium supplements  -drugs for inflammation like ibuprofen, naproxen, and others  -iron supplements  -magnesium supplements  -vitamins with minerals  This list may not describe all possible interactions. Give your health care provider a list of all the medicines, herbs, non-prescription drugs, or dietary supplements you use. Also tell them if you smoke, drink alcohol, or use illegal drugs. Some items may interact with your medicine.  What should I watch for while using this medicine?  Visit your doctor or health care professional for regular checks ups. It may be some time before you see benefit from this medicine. Do not stop taking your medicine except on your doctor's advice. Your doctor or health care professional may order blood tests and other tests to see how you are doing.  You should make sure you get enough calcium and vitamin D while you are taking this medicine, unless your doctor tells you not to. Discuss the foods you eat and the vitamins you take with your health care professional.  Some people who take this medicine have severe bone, joint, and/or muscle pain. This medicine may also increase your risk for a broken thigh bone. Tell your doctor right away if you have pain in your upper leg or groin. Tell your doctor if you have any pain that does not go away or that gets worse.  This medicine can make you more sensitive to the sun. If you get a rash while taking this medicine, sunlight may cause the rash to get worse. Keep out of the sun. If you cannot avoid being in the sun, wear protective clothing and use sunscreen. Do not use sun lamps or  tanning beds/booths.  What side effects may I notice from receiving this medicine?  Side effects that you should report to your doctor or health care professional as soon as possible:  -allergic reactions like skin rash, itching or hives, swelling of the face, lips, or tongue  -black or tarry stools  -bone, muscle or joint pain  -changes in vision  -chest pain  -heartburn or stomach pain  -jaw pain, especially after dental work  -pain or trouble when swallowing  -redness, blistering, peeling or loosening of the skin, including inside the mouth  Side effects that usually do not require medical attention (report to your doctor or health care professional if they continue or are bothersome):  -changes in taste  -diarrhea or constipation  -eye pain or itching  -headache  -nausea or vomiting  -stomach gas or fullness  This list may not describe all possible side effects. Call your doctor for medical advice about side effects. You may report side effects to FDA at 0-773-FDA-7316.  Where should I keep my medicine?  Keep out of the reach of children.  Store at room temperature of 15 and 30 degrees C (59 and 86 degrees F). Throw away any unused medicine after the expiration date.  NOTE: This sheet is a summary. It may not cover all possible information. If you have questions about this medicine, talk to your doctor, pharmacist, or health care provider.  © 2018 [employer] (2012-06-15 08:56:09)    Osteoporosis  Osteoporosis is the thinning and loss of density in the bones. Osteoporosis makes the bones more brittle, fragile, and likely to break (fracture). Over time, osteoporosis can cause the bones to become so weak that they fracture after a simple fall. The bones most likely to fracture are the bones in the hip, wrist, and spine.  What are the causes?  The exact cause is not known.  What increases the risk?  Anyone can develop osteoporosis. You may be at greater risk if you have a family history of the condition  or have poor nutrition. You may also have a higher risk if you are:  · Female.  · 50 years old or older.  · A smoker.  · Not physically active.  · White or .  · Slender.  What are the signs or symptoms?  A fracture might be the first sign of the disease, especially if it results from a fall or injury that would not usually cause a bone to break. Other signs and symptoms include:  · Low back and neck pain.  · Stooped posture.  · Height loss.  How is this diagnosed?  To make a diagnosis, your health care provider may:  · Take a medical history.  · Perform a physical exam.  · Order tests, such as:  ¨ A bone mineral density test.  ¨ A dual-energy X-ray absorptiometry test.  How is this treated?  The goal of osteoporosis treatment is to strengthen your bones to reduce your risk of a fracture. Treatment may involve:  · Making lifestyle changes, such as:  ¨ Eating a diet rich in calcium.  ¨ Doing weight-bearing and muscle-strengthening exercises.  ¨ Stopping tobacco use.  ¨ Limiting alcohol intake.  · Taking medicine to slow the process of bone loss or to increase bone density.  · Monitoring your levels of calcium and vitamin D.  Follow these instructions at home:  · Include calcium and vitamin D in your diet. Calcium is important for bone health, and vitamin D helps the body absorb calcium.  · Perform weight-bearing and muscle-strengthening exercises as directed by your health care provider.  · Do not use any tobacco products, including cigarettes, chewing tobacco, and electronic cigarettes. If you need help quitting, ask your health care provider.  · Limit your alcohol intake.  · Take medicines only as directed by your health care provider.  · Keep all follow-up visits as directed by your health care provider. This is important.  · Take precautions at home to lower your risk of falling, such as:  ¨ Keeping rooms well lit and clutter free.  ¨ Installing safety rails on stairs.  ¨ Using rubber mats in the bathroom and  other areas that are often wet or slippery.  Get help right away if:  You fall or injure yourself.  This information is not intended to replace advice given to you by your health care provider. Make sure you discuss any questions you have with your health care provider.  Document Released: 09/27/2006 Document Revised: 05/22/2017 Document Reviewed: 05/28/2015  [employer] Interactive Patient Education © 2017 [employer]    Vitamin D Deficiency  Vitamin D deficiency is when your body does not have enough vitamin D. Vitamin D is important to your body for many reasons:  · It helps the body to absorb two important minerals, called calcium and phosphorus.  · It plays a role in bone health.  · It may help to prevent some diseases, such as diabetes and multiple sclerosis.  · It plays a role in muscle function, including heart function.  You can get vitamin D by:  · Eating foods that naturally contain vitamin D.  · Eating or drinking milk or other dairy products that have vitamin D added to them.  · Taking a vitamin D supplement or a multivitamin supplement that contains vitamin D.  · Being in the sun. Your body naturally makes vitamin D when your skin is exposed to sunlight. Your body changes the sunlight into a form of the vitamin that the body can use.  If vitamin D deficiency is severe, it can cause a condition in which your bones become soft. In adults, this condition is called osteomalacia. In children, this condition is called rickets.  What are the causes?  Vitamin D deficiency may be caused by:  · Not eating enough foods that contain vitamin D.  · Not getting enough sun exposure.  · Having certain digestive system diseases that make it difficult for your body to absorb vitamin D. These diseases include Crohn disease, chronic pancreatitis, and cystic fibrosis.  · Having a surgery in which a part of the stomach or a part of the small intestine is removed.  · Being obese.  · Having chronic kidney disease or liver  disease.  What increases the risk?  This condition is more likely to develop in:  · Older people.  · People who do not spend much time outdoors.  · People who live in a long-term care facility.  · People who have had broken bones.  · People with weak or thin bones (osteoporosis).  · People who have a disease or condition that changes how the body absorbs vitamin D.  · People who have dark skin.  · People who take certain medicines, such as steroid medicines or certain seizure medicines.  · People who are overweight or obese.  What are the signs or symptoms?  In mild cases of vitamin D deficiency, there may not be any symptoms. If the condition is severe, symptoms may include:  · Bone pain.  · Muscle pain.  · Falling often.  · Broken bones caused by a minor injury.  How is this diagnosed?  This condition is usually diagnosed with a blood test.  How is this treated?  Treatment for this condition may depend on what caused the condition. Treatment options include:  · Taking vitamin D supplements.  · Taking a calcium supplement. Your health care provider will suggest what dose is best for you.  Follow these instructions at home:  · Take medicines and supplements only as told by your health care provider.  · Eat foods that contain vitamin D. Choices include:  ¨ Fortified dairy products, cereals, or juices. Fortified means that vitamin D has been added to the food. Check the label on the package to be sure.  ¨ Fatty fish, such as salmon or trout.  ¨ Eggs.  ¨ Oysters.  · Do not use a tanning bed.  · Maintain a healthy weight. Lose weight, if needed.  · Keep all follow-up visits as told by your health care provider. This is important.  Contact a health care provider if:  · Your symptoms do not go away.  · You feel like throwing up (nausea) or you throw up (vomit).  · You have fewer bowel movements than usual or it is difficult for you to have a bowel movement (constipation).  This information is not intended to replace  advice given to you by your health care provider. Make sure you discuss any questions you have with your health care provider.  Document Released: 03/11/2013 Document Revised: 05/31/2017 Document Reviewed: 05/04/2016  [employer] Interactive Patient Education © 2017 [employer]  Alendronate weekly tablets  What is this medicine?  ALENDRONATE (a TERRY droe joel) slows calcium loss from bones. It helps to make healthy bone and to slow bone loss in people with osteoporosis. It may be used to treat Paget's disease.  This medicine may be used for other purposes; ask your health care provider or pharmacist if you have questions.  COMMON BRAND NAME(S): Fosamax  What should I tell my health care provider before I take this medicine?  They need to know if you have any of these conditions:  -esophagus, stomach, or intestine problems, like acid-reflux or GERD  -dental disease  -kidney disease  -low blood calcium  -low vitamin D  -problems swallowing  -problems sitting or standing for 30 minutes  -an unusual or allergic reaction to alendronate, other medicines, foods, dyes, or preservatives  -pregnant or trying to get pregnant  -breast-feeding  How should I use this medicine?  You must take this medicine exactly as directed or you will lower the amount of medicine you absorb into your body or you may cause yourself harm. Take your dose by mouth first thing in the morning, after you are up for the day. Do not eat or drink anything before you take this medicine. Swallow your medicine with a full glass (6 to 8 fluid ounces) of plain water. Do not take this tablet with any other drink. Do not chew or crush the tablet. After taking this medicine, do not eat breakfast, drink, or take any medicines or vitamins for at least 30 minutes. Stand or sit up for at least 30 minutes after you take this medicine; do not lie down. Take this medicine on the same day every week. Do not take your medicine more often than directed.  Talk to your  pediatrician regarding the use of this medicine in children. Special care may be needed.  Overdosage: If you think you have taken too much of this medicine contact a poison control center or emergency room at once.  NOTE: This medicine is only for you. Do not share this medicine with others.  What if I miss a dose?  If you miss a dose, take the dose on the morning after you remember. Then take your next dose on your regular day of the week. Never take 2 tablets on the same day. Do not take double or extra doses.  What may interact with this medicine?  -aluminum hydroxide  -antacids  -aspirin  -calcium supplements  -drugs for inflammation like ibuprofen, naproxen, and others  -iron supplements  -magnesium supplements  -vitamins with minerals  This list may not describe all possible interactions. Give your health care provider a list of all the medicines, herbs, non-prescription drugs, or dietary supplements you use. Also tell them if you smoke, drink alcohol, or use illegal drugs. Some items may interact with your medicine.  What should I watch for while using this medicine?  Visit your doctor or health care professional for regular checks ups. It may be some time before you see benefit from this medicine. Do not stop taking your medication except on your doctor's advice. Your doctor or health care professional may order blood tests and other tests to see how you are doing.  You should make sure you get enough calcium and vitamin D while you are taking this medicine, unless your doctor tells you not to. Discuss the foods you eat and the vitamins you take with your health care professional.  Some people who take this medicine have severe bone, joint, and/or muscle pain. This medicine may also increase your risk for a broken thigh bone. Tell your doctor right away if you have pain in your upper leg or groin. Tell your doctor if you have any pain that does not go away or that gets worse.  This medicine can make you more  sensitive to the sun. If you get a rash while taking this medicine, sunlight may cause the rash to get worse. Keep out of the sun. If you cannot avoid being in the sun, wear protective clothing and use sunscreen. Do not use sun lamps or tanning beds/booths.  What side effects may I notice from receiving this medicine?  Side effects that you should report to your doctor or health care professional as soon as possible:  -allergic reactions like skin rash, itching or hives, swelling of the face, lips, or tongue  -black or tarry stools  -bone, muscle or joint pain  -changes in vision  -chest pain  -heartburn or stomach pain  -jaw pain, especially after dental work  -pain or trouble when swallowing  -redness, blistering, peeling or loosening of the skin, including inside the mouth  Side effects that usually do not require medical attention (report to your doctor or health care professional if they continue or are bothersome):  -changes in taste  -diarrhea or constipation  -eye pain or itching  -headache  -nausea or vomiting  -stomach gas or fullness  This list may not describe all possible side effects. Call your doctor for medical advice about side effects. You may report side effects to FDA at 2-831-FDA-6451.  Where should I keep my medicine?  Keep out of the reach of children.  Store at room temperature of 15 and 30 degrees C (59 and 86 degrees F). Throw away any unused medicine after the expiration date.  NOTE: This sheet is a summary. It may not cover all possible information. If you have questions about this medicine, talk to your doctor, pharmacist, or health care provider.  © 2018 [employer] (2012-11-01 09:02:42)

## 2018-02-28 NOTE — PROGRESS NOTES
Subjective   Jenn Lee is a 63 y.o. female.     Problem List  1. Essential Hypertension  2. Hyperlipidemia ASCVD risk >5%  3. Tobacco smoker  4. Chronic cough  5. Allergic Rhinitis   6. Leukocytosis  7. Possible COPD  8. Old compression fracture of lower thoracic vertebral body.   9. Osteoporosis     PSHx  -partial hysterectomy and 6 months later total hysterectomy  - right foot surgery  - tonsillectomy    Family Hx  -family history of cancer  -heart disease    Social Hx  Tobacco smoker    08/01/17 Pt is 63 yo WF with the above medical issues. Is here today with CC of lower abdominal pain that has been present for past few days. Pt states she has lower cramping abdominal pain that is intermittent. Pt also states she has constipation.  She has had subjective fever, chills, weakness, fatigue. Pt may have ate something that may have gotten sick. Denies blood in stools but states there is mucous. Pt's last colonoscopy >10 years ago. Do not have last report currently. Pt has not had abnormal colonoscopy in the past.  She is of average risk for colon cancer.      2/28/18 - pt is here for followup. Recently had DEXA scan done that showed osteoporotic bone disease.  Pt continues to smoke 1 ppd.  For many years.  She recently had chest x-ray done since she was positive for influenza. Chest x-ray showed no active disease but does have old compression fracture of thoracic spine.   Her BP is at goal.  She continues to take her medication for hyperlipidemia. And migraine headaches.  Pain today is 6/10 on severity .She would like to see a Spine Surgeon.  She declines PT/OT.  She has not had loss of bowel or bladder function       Back Pain   This is a recurrent problem. The current episode started more than 1 year ago. The problem occurs daily. The problem has been waxing and waning since onset. The pain is present in the sacro-iliac and thoracic spine. The quality of the pain is described as aching. The pain does not  "radiate. The pain is at a severity of 6/10. The pain is moderate. The pain is the same all the time. The symptoms are aggravated by bending, lying down, position, sitting and standing. Stiffness is present all day. Associated symptoms include numbness. Pertinent negatives include no abdominal pain, bladder incontinence, bowel incontinence, chest pain, dysuria, fever, leg pain, paresis, paresthesias, pelvic pain, perianal numbness, tingling, weakness or weight loss. Risk factors include history of osteoporosis. The treatment provided no relief.      The following portions of the patient's history were reviewed and updated as appropriate: allergies, current medications, past family history, past medical history, past social history, past surgical history and problem list.    Review of Systems   Constitutional: Positive for activity change. Negative for fever and weight loss.   HENT: Negative.    Eyes: Negative.    Respiratory: Negative.    Cardiovascular: Negative.  Negative for chest pain.   Gastrointestinal: Negative for abdominal pain and bowel incontinence.   Endocrine: Negative.    Genitourinary: Negative.  Negative for bladder incontinence, dysuria and pelvic pain.   Musculoskeletal: Positive for back pain and gait problem.   Skin: Negative.    Allergic/Immunologic: Negative.    Neurological: Positive for numbness. Negative for tingling, weakness and paresthesias.   Hematological: Negative.    Psychiatric/Behavioral: Negative.        Objective    /82 (BP Location: Right arm, Patient Position: Sitting, Cuff Size: Adult)  Pulse 75  Temp 98.7 °F (37.1 °C) (Oral)   Ht 157.5 cm (62\")  Wt 50.2 kg (110 lb 9.6 oz)  SpO2 98%  BMI 20.23 kg/m2      Chemistry        Component Value Date/Time     02/16/2018 0821    K 3.7 02/16/2018 0821     02/16/2018 0821    CO2 25.0 02/16/2018 0821    BUN 16 02/16/2018 0821    CREATININE 0.79 02/16/2018 0821        Component Value Date/Time    CALCIUM 9.2 02/16/2018 " 0821    ALKPHOS 120 02/16/2018 0821    AST 20 02/16/2018 0821    ALT 26 02/16/2018 0821    BILITOT 0.2 02/16/2018 0821        Lab Results   Component Value Date    WBC 11.34 (H) 02/16/2018    HGB 14.0 02/16/2018    HCT 42.6 02/16/2018    MCV 96.8 02/16/2018     02/16/2018     Orders Only on 02/16/2018   Component Date Value Ref Range Status   • WBC 02/16/2018 11.34* 3.20 - 9.80 10*3/mm3 Final   • RBC 02/16/2018 4.40  3.77 - 5.16 10*6/mm3 Final   • Hemoglobin 02/16/2018 14.0  12.0 - 15.5 g/dL Final   • Hematocrit 02/16/2018 42.6  35.0 - 45.0 % Final   • MCV 02/16/2018 96.8  80.0 - 98.0 fL Final   • MCH 02/16/2018 31.8  26.5 - 34.0 pg Final   • MCHC 02/16/2018 32.9  31.4 - 36.0 g/dL Final   • RDW 02/16/2018 13.0  11.5 - 14.5 % Final   • RDW-SD 02/16/2018 46.0  36.4 - 46.3 fl Final   • MPV 02/16/2018 10.7  8.0 - 12.0 fL Final   • Platelets 02/16/2018 274  150 - 450 10*3/mm3 Final   • Neutrophil % 02/16/2018 63.6  37.0 - 80.0 % Final   • Lymphocyte % 02/16/2018 26.5  10.0 - 50.0 % Final   • Monocyte % 02/16/2018 7.1  0.0 - 12.0 % Final   • Eosinophil % 02/16/2018 2.2  0.0 - 7.0 % Final   • Basophil % 02/16/2018 0.3  0.0 - 2.0 % Final   • Immature Grans % 02/16/2018 0.3  0.0 - 0.5 % Final   • Neutrophils, Absolute 02/16/2018 7.22  2.00 - 8.60 10*3/mm3 Final   • Lymphocytes, Absolute 02/16/2018 3.01  0.60 - 4.20 10*3/mm3 Final   • Monocytes, Absolute 02/16/2018 0.80  0.00 - 0.90 10*3/mm3 Final   • Eosinophils, Absolute 02/16/2018 0.25  0.00 - 0.70 10*3/mm3 Final   • Basophils, Absolute 02/16/2018 0.03  0.00 - 0.20 10*3/mm3 Final   • Immature Grans, Absolute 02/16/2018 0.03* 0.00 - 0.02 10*3/mm3 Final   • Glucose 02/16/2018 89  60 - 100 mg/dL Final   • BUN 02/16/2018 16  7 - 21 mg/dL Final   • Creatinine 02/16/2018 0.79  0.50 - 1.00 mg/dL Final   • Sodium 02/16/2018 137  137 - 145 mmol/L Final   • Potassium 02/16/2018 3.7  3.5 - 5.1 mmol/L Final   • Chloride 02/16/2018 100  95 - 110 mmol/L Final   • CO2 02/16/2018  25.0  22.0 - 31.0 mmol/L Final   • Calcium 02/16/2018 9.2  8.4 - 10.2 mg/dL Final   • Total Protein 02/16/2018 6.8  6.3 - 8.6 g/dL Final   • Albumin 02/16/2018 4.00  3.40 - 4.80 g/dL Final   • ALT (SGPT) 02/16/2018 26  9 - 52 U/L Final   • AST (SGOT) 02/16/2018 20  14 - 36 U/L Final   • Alkaline Phosphatase 02/16/2018 120  38 - 126 U/L Final   • Total Bilirubin 02/16/2018 0.2  0.2 - 1.3 mg/dL Final   • eGFR Non African Amer 02/16/2018 74  45 - 104 mL/min/1.73 Final   • Globulin 02/16/2018 2.8  2.3 - 3.5 gm/dL Final   • A/G Ratio 02/16/2018 1.4  1.1 - 1.8 g/dL Final   • BUN/Creatinine Ratio 02/16/2018 20.3  7.0 - 25.0 Final   • Anion Gap 02/16/2018 12.0  5.0 - 15.0 mmol/L Final   • C-Reactive Protein 02/16/2018 <0.50  0.00 - 1.00 mg/dL Final   • Sed Rate 02/16/2018 16  0 - 20 mm/hr Final       Physical Exam   Constitutional: She is oriented to person, place, and time. She appears well-developed and well-nourished. No distress.   HENT:   Head: Normocephalic and atraumatic.   Right Ear: External ear normal.   Left Ear: External ear normal.   Eyes: Conjunctivae and EOM are normal. Pupils are equal, round, and reactive to light. Right eye exhibits no discharge. Left eye exhibits no discharge. No scleral icterus.   Neck: Normal range of motion. Neck supple. No JVD present. No tracheal deviation present. No thyromegaly present.   Cardiovascular: Normal rate, regular rhythm and normal heart sounds.    Pulmonary/Chest: Effort normal and breath sounds normal. No stridor. No respiratory distress. She has no wheezes.   Abdominal: Soft. Bowel sounds are normal. She exhibits no distension, no pulsatile liver, no ascites and no mass. There is no tenderness. There is no rebound, no guarding, no tenderness at McBurney's point and negative Rowell's sign. No hernia.   Musculoskeletal: Normal range of motion. She exhibits tenderness. She exhibits no edema or deformity.        Arms:  Neurological: She is alert and oriented to person,  place, and time. She has normal reflexes. No cranial nerve deficit. Coordination normal.   Skin: Skin is warm and dry. No rash noted. She is not diaphoretic. No erythema. No pallor.   Psychiatric: She has a normal mood and affect.   Nursing note and vitals reviewed.      Assessment/Plan      P   ICD-10-CM ICD-9-CM   PL                       1.   Closed compression fracture of thoracic vertebra, sequela     S22.000S 905.1  Change Dx       2.   Osteoporosis, unspecified osteoporosis type, unspecified pathological fracture presence     M81.0 733.00  Change Dx       3.   Other migraine without status migrainosus, not intractable     G43.809 346.80  Change Dx       4.   General medical examination     Z00.00 V70.9  Change Dx       5.   Chronic thoracic back pain, unspecified back pain laterality     M54.6 ... 724.1 ...  Change Dx       6.   Tobacco user     Z72.0 305.1  Change Dx           - for old compression fracture of thoracic spine - will refer to Dr. Gutierrez (Spine Surgeon) in [place]. Consultation appreciated.  Will increase elavil from 25 to 50 mg PO qhs for pain. Consider calcitonin therapy. Pt declines PT/OT. Recommend weight bearing exercise. Diet information provided   - osteoporosis - pt already on citracal with Vitamin D. Will add Fosamax 70 mg PO q weekly. Drug information provided.  Pt has no issues swallowing and recent renal function test normal.   - gave information today on osteoporosis, Vitamin D deficiency, information on fosamax.    - advised pt to quit smoking >5 minutes.  Pt declines nicotine patches or chantix. Recommend 1 800 QUIT NOW  - recheck in 6-12 months  - labwork in 6-12 months

## 2018-04-02 RX ORDER — CLINDAMYCIN HYDROCHLORIDE 150 MG/1
150 CAPSULE ORAL 3 TIMES DAILY
Qty: 21 CAPSULE | Refills: 0 | Status: SHIPPED | OUTPATIENT
Start: 2018-04-02 | End: 2018-04-09

## 2018-05-31 DIAGNOSIS — M10.9 ACUTE GOUT, UNSPECIFIED CAUSE, UNSPECIFIED SITE: Primary | ICD-10-CM

## 2018-05-31 PROCEDURE — 36415 COLL VENOUS BLD VENIPUNCTURE: CPT | Performed by: FAMILY MEDICINE

## 2018-05-31 PROCEDURE — 84550 ASSAY OF BLOOD/URIC ACID: CPT | Performed by: FAMILY MEDICINE

## 2018-05-31 PROCEDURE — 80048 BASIC METABOLIC PNL TOTAL CA: CPT | Performed by: FAMILY MEDICINE

## 2018-05-31 RX ORDER — COLCHICINE 0.6 MG/1
TABLET ORAL
Qty: 3 TABLET | Refills: 0 | Status: SHIPPED | OUTPATIENT
Start: 2018-05-31 | End: 2018-10-04

## 2018-05-31 RX ORDER — PREDNISONE 10 MG/1
TABLET ORAL
Qty: 30 TABLET | Refills: 0 | Status: SHIPPED | OUTPATIENT
Start: 2018-05-31 | End: 2018-10-04

## 2018-06-01 LAB
ANION GAP SERPL CALCULATED.3IONS-SCNC: 9 MMOL/L (ref 5–15)
BUN BLD-MCNC: 15 MG/DL (ref 7–21)
BUN/CREAT SERPL: 13.6 (ref 7–25)
CALCIUM SPEC-SCNC: 9.7 MG/DL (ref 8.4–10.2)
CHLORIDE SERPL-SCNC: 99 MMOL/L (ref 95–110)
CO2 SERPL-SCNC: 31 MMOL/L (ref 22–31)
CREAT BLD-MCNC: 1.1 MG/DL (ref 0.5–1)
GFR SERPL CREATININE-BSD FRML MDRD: 50 ML/MIN/1.73 (ref 45–104)
GLUCOSE BLD-MCNC: 81 MG/DL (ref 60–100)
POTASSIUM BLD-SCNC: 4.2 MMOL/L (ref 3.5–5.1)
SODIUM BLD-SCNC: 139 MMOL/L (ref 137–145)
URATE SERPL-MCNC: 4.6 MG/DL (ref 2.5–8.5)

## 2018-06-18 RX ORDER — AMITRIPTYLINE HYDROCHLORIDE 25 MG/1
50 TABLET, FILM COATED ORAL NIGHTLY
Qty: 180 TABLET | Refills: 1 | Status: SHIPPED | OUTPATIENT
Start: 2018-06-18 | End: 2018-10-29 | Stop reason: SDUPTHER

## 2018-07-31 RX ORDER — LOSARTAN POTASSIUM 25 MG/1
25 TABLET ORAL DAILY
Qty: 90 TABLET | Refills: 3 | Status: SHIPPED | OUTPATIENT
Start: 2018-07-31 | End: 2019-03-04

## 2018-08-16 RX ORDER — SIMVASTATIN 20 MG
20 TABLET ORAL NIGHTLY
Qty: 90 TABLET | Refills: 4 | Status: SHIPPED | OUTPATIENT
Start: 2018-08-16 | End: 2018-10-29 | Stop reason: SDUPTHER

## 2018-09-24 RX ORDER — MONTELUKAST SODIUM 10 MG/1
TABLET ORAL
Qty: 90 TABLET | Refills: 2 | Status: SHIPPED | OUTPATIENT
Start: 2018-09-24 | End: 2019-06-02 | Stop reason: SDUPTHER

## 2018-10-04 ENCOUNTER — OFFICE VISIT (OUTPATIENT)
Dept: FAMILY MEDICINE CLINIC | Facility: CLINIC | Age: 63
End: 2018-10-04

## 2018-10-04 VITALS
HEART RATE: 76 BPM | TEMPERATURE: 97.7 F | BODY MASS INDEX: 20.43 KG/M2 | SYSTOLIC BLOOD PRESSURE: 140 MMHG | DIASTOLIC BLOOD PRESSURE: 82 MMHG | OXYGEN SATURATION: 97 % | HEIGHT: 62 IN | WEIGHT: 111 LBS

## 2018-10-04 DIAGNOSIS — Z72.0 TOBACCO USER: ICD-10-CM

## 2018-10-04 DIAGNOSIS — M25.552 BILATERAL HIP PAIN: ICD-10-CM

## 2018-10-04 DIAGNOSIS — M16.0 OSTEOARTHRITIS OF BOTH HIPS, UNSPECIFIED OSTEOARTHRITIS TYPE: Primary | ICD-10-CM

## 2018-10-04 DIAGNOSIS — M25.551 BILATERAL HIP PAIN: ICD-10-CM

## 2018-10-04 DIAGNOSIS — M25.551 BILATERAL HIP PAIN: Primary | ICD-10-CM

## 2018-10-04 DIAGNOSIS — M81.0 OSTEOPOROSIS, UNSPECIFIED OSTEOPOROSIS TYPE, UNSPECIFIED PATHOLOGICAL FRACTURE PRESENCE: ICD-10-CM

## 2018-10-04 DIAGNOSIS — M25.552 BILATERAL HIP PAIN: Primary | ICD-10-CM

## 2018-10-04 PROCEDURE — 99213 OFFICE O/P EST LOW 20 MIN: CPT | Performed by: FAMILY MEDICINE

## 2018-10-04 NOTE — PATIENT INSTRUCTIONS
Diclofenac skin gel  What is this medicine?  DICLOFENAC (dye RILEY ruffin ak) is a non-steroidal anti-inflammatory drug (NSAID). The 1% skin gel is used to treat osteoarthritis of the hands or knees. The 3% skin gel is used to treat actinic keratosis.  This medicine may be used for other purposes; ask your health care provider or pharmacist if you have questions.  COMMON BRAND NAME(S): DSG Trem[place]ne, Solaraze, Voltaren Gel  What should I tell my health care provider before I take this medicine?  They need to know if you have any of these conditions:  -asthma  -bleeding problems  -coronary artery bypass graft (CABG) surgery within the past 2 weeks  -heart disease  -high blood pressure  -if you frequently drink alcohol containing drinks  -kidney disease  -liver disease  -open or infected skin  -stomach problems  -an unusual or allergic reaction to diclofenac, aspirin, other NSAIDs, other medicines, benzyl alcohol (3% gel only), foods, dyes, or preservatives  -pregnant or trying to get pregnant  -breast-feeding  How should I use this medicine?  This medicine is for external use only. Follow the directions on the prescription label. Wash hands before and after use. Do not get this medicine in your eyes. If you do, rinse out with plenty of cool tap water. Use your doses at regular intervals. Do not use your medicine more often than directed.  A special MedGuide will be given to you by the pharmacist with each prescription and refill of the 1% gel. Be sure to read this information carefully each time.  Talk to your pediatrician regarding the use of this medicine in children. Special care may be needed. The 3% gel is not approved for use in children.  Overdosage: If you think you have taken too much of this medicine contact a poison control center or emergency room at once.  NOTE: This medicine is only for you. Do not share this medicine with others.  What if I miss a dose?  If you miss a dose, use it as soon as you can. If it is  almost time for your next dose, use only that dose. Do not use double or extra doses.  What may interact with this medicine?  -aspirin  -NSAIDs, medicines for pain and inflammation, like ibuprofen or naproxen  Do not use any other skin products without telling your doctor or health care professional.  This list may not describe all possible interactions. Give your health care provider a list of all the medicines, herbs, non-prescription drugs, or dietary supplements you use. Also tell them if you smoke, drink alcohol, or use illegal drugs. Some items may interact with your medicine.  What should I watch for while using this medicine?  Tell your doctor or healthcare professional if your symptoms do not start to get better or if they get worse. You will need to follow up with your health care provider to monitor your progress. You may need to be treated for up to 3 months if you are using the 3% gel, but the full effect may not occur until 1 month after stopping treatment. If you develop a severe skin reaction, contact your doctor or health care professional immediately.  This medicine can make you more sensitive to the sun. Keep out of the sun. If you cannot avoid being in the sun, wear protective clothing and use sunscreen. Do not use sun lamps or tanning beds/booths.  Do not take medicines such as ibuprofen and naproxen with this medicine. Side effects such as stomach upset, nausea, or ulcers may be more likely to occur. Many medicines available without a prescription should not be taken with this medicine.  This medicine does not prevent heart attack or stroke. In fact, this medicine may increase the chance of a heart attack or stroke. The chance may increase with longer use of this medicine and in people who have heart disease. If you take aspirin to prevent heart attack or stroke, talk with your doctor or health care professional.  This medicine can cause ulcers and bleeding in the stomach and intestines at any  time during treatment. Do not smoke cigarettes or drink alcohol. These increase irritation to your stomach and can make it more susceptible to damage from this medicine. Ulcers and bleeding can happen without warning symptoms and can cause death.  You may get drowsy or dizzy. Do not drive, use machinery, or do anything that needs mental alertness until you know how this medicine affects you. Do not stand or sit up quickly, especially if you are an older patient. This reduces the risk of dizzy or fainting spells.  This medicine can cause you to bleed more easily. Try to avoid damage to your teeth and gums when you brush or floss your teeth.  What side effects may I notice from receiving this medicine?  Side effects that you should report to your doctor or health care professional as soon as possible:  -allergic reactions like skin rash, itching or hives, swelling of the face, lips, or tongue  -black or bloody stools, blood in the urine or vomit  -blurred vision  -chest pain  -difficulty breathing or wheezing  -nausea or vomiting  -redness, blistering, peeling or loosening of the skin, including inside the mouth  -slurred speech or weakness on one side of the body  -trouble passing urine or change in the amount of urine  -unexplained weight gain or swelling  -unusually weak or tired  -yellowing of eyes or skin  Side effects that usually do not require medical attention (report to your doctor or health care professional if they continue or are bothersome):  -dizziness  -dry skin  -headache  -heartburn  -increased sensitivity to the sun  -stomach pain  -tingling at the application site  This list may not describe all possible side effects. Call your doctor for medical advice about side effects. You may report side effects to FDA at 9-221-FDA-9614.  Where should I keep my medicine?  Keep out of the reach of children.  Store the 1% gel at room temperature between 15 and 30 degrees C (59 and 86 degrees F). Store the 3% gel  at room temperature between 20 and 25 degrees C (68 and 77 degrees F). Protect from light. Throw away any unused medicine after the expiration date.  NOTE: This sheet is a summary. It may not cover all possible information. If you have questions about this medicine, talk to your doctor, pharmacist, or health care provider.  © 2018 [employer] (2009-04-20 16:35:07)

## 2018-10-04 NOTE — PROGRESS NOTES
Subjective   Jenn Lee is a 63 y.o. female.     Problem List  1. Essential Hypertension  2. Hyperlipidemia ASCVD risk >5%  3. Tobacco smoker  4. Chronic cough  5. Allergic Rhinitis   6. Leukocytosis  7. Possible COPD  8. Old compression fracture of lower thoracic vertebral body.   9. Osteoporosis     PSHx  -partial hysterectomy and 6 months later total hysterectomy  - right foot surgery  - tonsillectomy    Family Hx  -family history of cancer  -heart disease    Social Hx  Tobacco smoker    08/01/17 Pt is 61 yo WF with the above medical issues. Is here today with CC of lower abdominal pain that has been present for past few days. Pt states she has lower cramping abdominal pain that is intermittent. Pt also states she has constipation.  She has had subjective fever, chills, weakness, fatigue. Pt may have ate something that may have gotten sick. Denies blood in stools but states there is mucous. Pt's last colonoscopy >10 years ago. Do not have last report currently. Pt has not had abnormal colonoscopy in the past.  She is of average risk for colon cancer.      2/28/18 - pt is here for followup. Recently had DEXA scan done that showed osteoporotic bone disease.  Pt continues to smoke 1 ppd.  For many years.  She recently had chest x-ray done since she was positive for influenza. Chest x-ray showed no active disease but does have old compression fracture of thoracic spine.   Her BP is at goal.  She continues to take her medication for hyperlipidemia. And migraine headaches.  Pain today is 6/10 on severity .She would like to see a Spine Surgeon.  She declines PT/OT.  She has not had loss of bowel or bladder function     10/4/18 pt is here for recheck and followup.  She has been having bilateral hip pain more on left than right for past few months that are getting worse. She states it hurts to go up steps.  She denies any trauma or fall. She is on elavil for migraine headaches along with propranolol. She declines  PT/OT at this time       Hip Pain    The incident occurred more than 1 week ago. The incident occurred at home. The injury mechanism is unknown. The pain is present in the left hip and right hip. The pain is at a severity of 5/10. The pain is moderate. The pain has been worsening since onset. Associated symptoms include an inability to bear weight, a loss of motion, muscle weakness and numbness. Pertinent negatives include no tingling. She reports no foreign bodies present. The symptoms are aggravated by movement, palpation and weight bearing. She has tried nothing for the symptoms. The treatment provided no relief.   Back Pain   This is a recurrent problem. The current episode started more than 1 year ago. The problem occurs daily. The problem has been waxing and waning since onset. The pain is present in the sacro-iliac and thoracic spine. The quality of the pain is described as aching. The pain does not radiate. The pain is at a severity of 6/10. The pain is moderate. The pain is the same all the time. The symptoms are aggravated by bending, lying down, position, sitting and standing. Stiffness is present all day. Associated symptoms include numbness. Pertinent negatives include no abdominal pain, bladder incontinence, bowel incontinence, chest pain, dysuria, fever, leg pain, paresis, paresthesias, pelvic pain, perianal numbness, tingling, weakness or weight loss. Risk factors include history of osteoporosis. The treatment provided no relief.      The following portions of the patient's history were reviewed and updated as appropriate: allergies, current medications, past family history, past medical history, past social history, past surgical history and problem list.  Patient Active Problem List   Diagnosis   • Lower abdominal pain   • Closed compression fracture of thoracic vertebra (CMS/HCC)   • Osteoporosis   • Migraine   • General medical examination   • Chronic thoracic back pain   • Tobacco user      Current Outpatient Prescriptions on File Prior to Visit   Medication Sig Dispense Refill   • albuterol (PROVENTIL HFA;VENTOLIN HFA) 108 (90 BASE) MCG/ACT inhaler Inhale 2 puffs Every 4 (Four) Hours As Needed for Wheezing. 1 inhaler 11   • amitriptyline (ELAVIL) 25 MG tablet Take 2 tablets by mouth Every Night. 180 tablet 1   • Calcium-Phosphorus-Vitamin D (CITRACAL +D3 PO) Take 1 tablet by mouth Daily.     • losartan (COZAAR) 25 MG tablet Take 1 tablet by mouth Daily. 90 tablet 3   • montelukast (SINGULAIR) 10 MG tablet TAKE ONE TABLET BY MOUTH ONCE DAILY AT BEDTIME 90 tablet 2   • propranolol (INDERAL) 40 MG tablet Take 1 tablet by mouth Daily. 90 tablet 6   • simvastatin (ZOCOR) 20 MG tablet Take 1 tablet by mouth Every Night. 90 tablet 4   • [DISCONTINUED] cetirizine (zyrTEC) 10 MG tablet Take 1 tablet by mouth Daily. 30 tablet 11   • [DISCONTINUED] colchicine 0.6 MG tablet Take 2 tablets by mouth then 1 tablet by mouth 1 hour later. 3 tablet 0   • [DISCONTINUED] predniSONE (DELTASONE) 10 MG tablet Take 4 pills for 4 days 3 pills for 3 days 2 pills for 2 days and 1 pill for one day then stop 30 tablet 0     No current facility-administered medications on file prior to visit.        Review of Systems   Constitutional: Positive for activity change. Negative for fever and weight loss.   HENT: Negative.    Eyes: Negative.    Respiratory: Negative.    Cardiovascular: Negative.  Negative for chest pain.   Gastrointestinal: Negative for abdominal pain and bowel incontinence.   Endocrine: Negative.    Genitourinary: Negative.  Negative for bladder incontinence, dysuria and pelvic pain.   Musculoskeletal: Positive for arthralgias, back pain and gait problem.   Skin: Negative.    Allergic/Immunologic: Negative.    Neurological: Positive for numbness. Negative for tingling, weakness and paresthesias.   Hematological: Negative.    Psychiatric/Behavioral: Negative.        Objective    /82 (BP Location: Right arm,  "Patient Position: Sitting, Cuff Size: Adult)   Pulse 76   Temp 97.7 °F (36.5 °C) (Tympanic)   Ht 157.5 cm (62\")   Wt 50.3 kg (111 lb)   SpO2 97%   BMI 20.30 kg/m²       Chemistry        Component Value Date/Time     05/31/2018 1349    K 4.2 05/31/2018 1349    CL 99 05/31/2018 1349    CO2 31.0 05/31/2018 1349    BUN 15 05/31/2018 1349    CREATININE 1.10 (H) 05/31/2018 1349        Component Value Date/Time    CALCIUM 9.7 05/31/2018 1349    ALKPHOS 120 02/16/2018 0821    AST 20 02/16/2018 0821    ALT 26 02/16/2018 0821    BILITOT 0.2 02/16/2018 0821        Lab Results   Component Value Date    WBC 11.34 (H) 02/16/2018    HGB 14.0 02/16/2018    HCT 42.6 02/16/2018    MCV 96.8 02/16/2018     02/16/2018     Orders Only on 05/31/2018   Component Date Value Ref Range Status   • Uric Acid 05/31/2018 4.6  2.5 - 8.5 mg/dL Final   • Glucose 05/31/2018 81  60 - 100 mg/dL Final   • BUN 05/31/2018 15  7 - 21 mg/dL Final   • Creatinine 05/31/2018 1.10* 0.50 - 1.00 mg/dL Final   • Sodium 05/31/2018 139  137 - 145 mmol/L Final   • Potassium 05/31/2018 4.2  3.5 - 5.1 mmol/L Final   • Chloride 05/31/2018 99  95 - 110 mmol/L Final   • CO2 05/31/2018 31.0  22.0 - 31.0 mmol/L Final   • Calcium 05/31/2018 9.7  8.4 - 10.2 mg/dL Final   • eGFR Non African Amer 05/31/2018 50  45 - 104 mL/min/1.73 Final   • BUN/Creatinine Ratio 05/31/2018 13.6  7.0 - 25.0 Final   • Anion Gap 05/31/2018 9.0  5.0 - 15.0 mmol/L Final       Physical Exam   Constitutional: She is oriented to person, place, and time. She appears well-developed and well-nourished. No distress.   HENT:   Head: Normocephalic and atraumatic.   Right Ear: External ear normal.   Left Ear: External ear normal.   Eyes: Pupils are equal, round, and reactive to light. Conjunctivae and EOM are normal. Right eye exhibits no discharge. Left eye exhibits no discharge. No scleral icterus.   Neck: Normal range of motion. Neck supple. No JVD present. No tracheal deviation present. " No thyromegaly present.   Cardiovascular: Normal rate, regular rhythm and normal heart sounds.    Pulmonary/Chest: Effort normal and breath sounds normal. No stridor. No respiratory distress. She has no wheezes.   Abdominal: Soft. Bowel sounds are normal. She exhibits no distension, no pulsatile liver, no ascites and no mass. There is no tenderness. There is no rebound, no guarding, no tenderness at McBurney's point and negative Rowell's sign. No hernia.   Musculoskeletal: She exhibits tenderness. She exhibits no edema or deformity.        Right shoulder: She exhibits decreased range of motion, tenderness and bony tenderness.        Right hip: She exhibits decreased range of motion, decreased strength, tenderness and bony tenderness.        Left hip: She exhibits decreased range of motion, decreased strength, tenderness and bony tenderness.        Arms:  Neurological: She is alert and oriented to person, place, and time. She has normal reflexes. No cranial nerve deficit. Coordination normal.   Skin: Skin is warm and dry. No rash noted. She is not diaphoretic. No erythema. No pallor.   Psychiatric: She has a normal mood and affect.   Nursing note and vitals reviewed.      Assessment/Plan       P   ICD-10-CM ICD-9-CM   PL                   1.   Bilateral hip pain M25.551 ... 719.45  Change Dx      2.   Osteoarthritis of both hips, unspecified osteoarthritis type M16.0 715.95  Change Dx      3.   Tobacco user Z72.0 305.1  Change Dx      4.   Osteoporosis, unspecified osteoporosis type, unspecified pathological fracture presence M81.0 733.00  Change Dx           -for hip pain/osteoarthritis - reviewed x-rays and has mild osteoarthritis of both hips. I recommend PT/OT but pt declined. I will refer to Orthopedic surgery APRAZALIA King and consultation appreciated. Pt may need MRI or CT scan.   She may need right hip arthroplasty but pt has osteoporosis and recommend Bone clinic referral. She declined for now.  Will  await opinion of Orthopedic Surgery. Will give voltaren gel to use QID. Drug information provided.    - for old compression fracture of thoracic spine - will refer to Dr. Gutierrez (Spine Surgeon) in [place]. Consultation appreciated.  Will increase elavil from 25 to 50 mg PO qhs for pain. Consider calcitonin therapy. Pt declines PT/OT. Recommend weight bearing exercise. Diet information provided   - osteoporosis - pt already on citracal with Vitamin D. Will add Fosamax 70 mg PO q weekly. Drug information provided.  Pt has no issues swallowing and recent renal function test normal.   - gave information today on osteoporosis, Vitamin D deficiency, information on fosamax.    - advised pt to quit smoking >5 minutes.  Pt declines nicotine patches or chantix. Recommend 1 800 QUIT NO. I advised pt to quit smoking   - recheck in 2-3 months         This document has been electronically signed by Diego Gusman MD on October 4, 2018 10:36 AM                 Review of Systems   Constitutional: Positive for activity change. Negative for fever and unexpected weight loss.   HENT: Negative.    Eyes: Negative.    Respiratory: Negative.    Cardiovascular: Negative.  Negative for chest pain.   Gastrointestinal: Negative for abdominal pain and bowel incontinence.   Endocrine: Negative.    Genitourinary: Negative.  Negative for urinary incontinence, dysuria and pelvic pain.   Musculoskeletal: Positive for arthralgias, back pain and gait problem.   Skin: Negative.    Allergic/Immunologic: Negative.    Neurological: Positive for numbness. Negative for tingling, weakness and paresthesias.   Hematological: Negative.    Psychiatric/Behavioral: Negative.        Physical Exam   Constitutional: She is oriented to person, place, and time. She appears well-developed and well-nourished. No distress.   HENT:   Head: Normocephalic and atraumatic.   Right Ear: External ear normal.   Left Ear: External ear normal.   Eyes: Pupils are equal,  round, and reactive to light. Conjunctivae and EOM are normal. Right eye exhibits no discharge. Left eye exhibits no discharge. No scleral icterus.   Neck: Normal range of motion. Neck supple. No JVD present. No tracheal deviation present. No thyromegaly present.   Cardiovascular: Normal rate, regular rhythm and normal heart sounds.    Pulmonary/Chest: Effort normal and breath sounds normal. No stridor. No respiratory distress. She has no wheezes.   Abdominal: Soft. Bowel sounds are normal. She exhibits no distension, no pulsatile liver, no ascites and no mass. There is no tenderness. There is no rebound, no guarding, no tenderness at McBurney's point and negative Rowell's sign. No hernia.   Musculoskeletal: She exhibits tenderness. She exhibits no edema or deformity.        Right shoulder: She exhibits decreased range of motion, tenderness and bony tenderness.        Right hip: She exhibits decreased range of motion, decreased strength, tenderness and bony tenderness.        Left hip: She exhibits decreased range of motion, decreased strength, tenderness and bony tenderness.        Arms:  Neurological: She is alert and oriented to person, place, and time. She has normal reflexes. No cranial nerve deficit. Coordination normal.   Skin: Skin is warm and dry. No rash noted. She is not diaphoretic. No erythema. No pallor.   Psychiatric: She has a normal mood and affect.   Nursing note and vitals reviewed.

## 2018-10-05 RX ORDER — MELOXICAM 15 MG/1
15 TABLET ORAL DAILY
Qty: 30 TABLET | Refills: 3 | Status: SHIPPED | OUTPATIENT
Start: 2018-10-05 | End: 2018-11-01 | Stop reason: SDUPTHER

## 2018-10-29 RX ORDER — SIMVASTATIN 20 MG
20 TABLET ORAL NIGHTLY
Qty: 90 TABLET | Refills: 4 | Status: SHIPPED | OUTPATIENT
Start: 2018-10-29 | End: 2019-09-16 | Stop reason: SDUPTHER

## 2018-10-29 RX ORDER — AMITRIPTYLINE HYDROCHLORIDE 25 MG/1
50 TABLET, FILM COATED ORAL NIGHTLY
Qty: 180 TABLET | Refills: 1 | Status: SHIPPED | OUTPATIENT
Start: 2018-10-29 | End: 2019-09-16 | Stop reason: SDUPTHER

## 2018-10-29 RX ORDER — PROPRANOLOL HYDROCHLORIDE 40 MG/1
TABLET ORAL
Qty: 90 TABLET | Refills: 3 | Status: SHIPPED | OUTPATIENT
Start: 2018-10-29 | End: 2018-10-29 | Stop reason: SDUPTHER

## 2018-10-29 RX ORDER — PROPRANOLOL HYDROCHLORIDE 40 MG/1
40 TABLET ORAL DAILY
Qty: 90 TABLET | Refills: 3 | Status: SHIPPED | OUTPATIENT
Start: 2018-10-29 | End: 2019-04-08 | Stop reason: SDUPTHER

## 2018-11-01 RX ORDER — MELOXICAM 15 MG/1
15 TABLET ORAL DAILY
Qty: 90 TABLET | Refills: 1 | Status: SHIPPED | OUTPATIENT
Start: 2018-11-01 | End: 2018-11-27

## 2018-11-13 ENCOUNTER — LAB (OUTPATIENT)
Dept: LAB | Facility: HOSPITAL | Age: 63
End: 2018-11-13

## 2018-11-13 DIAGNOSIS — Z02.83 ENCOUNTER FOR DRUG SCREENING: ICD-10-CM

## 2018-11-13 DIAGNOSIS — Z02.83 ENCOUNTER FOR DRUG SCREENING: Primary | ICD-10-CM

## 2018-11-13 LAB
AMPHET+METHAMPHET UR QL: NEGATIVE
BARBITURATES UR QL SCN: NEGATIVE
BENZODIAZ UR QL SCN: NEGATIVE
CANNABINOIDS SERPL QL: NEGATIVE
COCAINE UR QL: NEGATIVE
METHADONE UR QL SCN: NEGATIVE
OPIATES UR QL: NEGATIVE
OXYCODONE UR QL SCN: NEGATIVE

## 2018-11-13 PROCEDURE — 80307 DRUG TEST PRSMV CHEM ANLYZR: CPT

## 2018-11-13 RX ORDER — GABAPENTIN 300 MG/1
300 CAPSULE ORAL NIGHTLY PRN
Qty: 10 CAPSULE | Refills: 0 | Status: SHIPPED | OUTPATIENT
Start: 2018-11-13 | End: 2018-11-19 | Stop reason: SDUPTHER

## 2018-11-19 RX ORDER — GABAPENTIN 300 MG/1
600 CAPSULE ORAL NIGHTLY PRN
Qty: 60 CAPSULE | Refills: 2 | Status: SHIPPED | OUTPATIENT
Start: 2018-11-19 | End: 2019-02-08 | Stop reason: SDUPTHER

## 2018-11-19 RX ORDER — GABAPENTIN 300 MG/1
600 CAPSULE ORAL NIGHTLY PRN
Qty: 60 CAPSULE | Refills: 2 | Status: SHIPPED | OUTPATIENT
Start: 2018-11-19 | End: 2018-11-19 | Stop reason: SDUPTHER

## 2018-11-27 ENCOUNTER — OFFICE VISIT (OUTPATIENT)
Dept: ORTHOPEDIC SURGERY | Facility: CLINIC | Age: 63
End: 2018-11-27

## 2018-11-27 ENCOUNTER — TELEPHONE (OUTPATIENT)
Dept: ORTHOPEDIC SURGERY | Facility: CLINIC | Age: 63
End: 2018-11-27

## 2018-11-27 VITALS — BODY MASS INDEX: 20.61 KG/M2 | HEIGHT: 62 IN | WEIGHT: 112 LBS

## 2018-11-27 DIAGNOSIS — M81.0 OSTEOPOROSIS, UNSPECIFIED OSTEOPOROSIS TYPE, UNSPECIFIED PATHOLOGICAL FRACTURE PRESENCE: ICD-10-CM

## 2018-11-27 DIAGNOSIS — M25.552 ACUTE HIP PAIN, LEFT: Primary | ICD-10-CM

## 2018-11-27 PROCEDURE — 99214 OFFICE O/P EST MOD 30 MIN: CPT | Performed by: NURSE PRACTITIONER

## 2018-11-27 RX ORDER — TRAMADOL HYDROCHLORIDE 50 MG/1
TABLET ORAL
Qty: 40 TABLET | Refills: 0 | Status: SHIPPED | OUTPATIENT
Start: 2018-11-27 | End: 2019-03-12 | Stop reason: SDUPTHER

## 2018-11-27 NOTE — PROGRESS NOTES
Jenn Lee is a 63 y.o. female   Primary provider:  Diego Gusman MD       Chief Complaint   Patient presents with   • Left Hip - Pain, Establish Care   • Right Hip - Pain, Establish Care       HISTORY OF PRESENT ILLNESS: hip pain started about 3 months ago, no injury. Patient referred by sana Castorena done on 10/4/2018. Patient took meloxicam and ibuprofen but does not help.     Hip Pain    The incident occurred more than 1 week ago. There was no injury mechanism. The pain is present in the left hip and right hip. The quality of the pain is described as aching (grinding. ). The pain is severe. The pain has been constant since onset. Associated symptoms include numbness. Associated symptoms comments: Clicking, swelling, . She reports no foreign bodies present. The symptoms are aggravated by weight bearing (standing, sitting, walking. ). She has tried rest and NSAIDs for the symptoms.        CONCURRENT MEDICAL HISTORY:    Past Medical History:   Diagnosis Date   • Ankle edema    • Chronic bronchitis (CMS/HCC)    • Migraine    • Pain in right knee     nondisplaced facture of the patella     • Peroneal tendinitis    • Sprain of ankle        Allergies   Allergen Reactions   • Lisinopril Cough         Current Outpatient Medications:   •  albuterol (PROVENTIL HFA;VENTOLIN HFA) 108 (90 BASE) MCG/ACT inhaler, Inhale 2 puffs Every 4 (Four) Hours As Needed for Wheezing., Disp: 1 inhaler, Rfl: 11  •  amitriptyline (ELAVIL) 25 MG tablet, Take 2 tablets by mouth Every Night., Disp: 180 tablet, Rfl: 1  •  gabapentin (NEURONTIN) 300 MG capsule, Take 2 capsules by mouth At Night As Needed (for moderate pain)., Disp: 60 capsule, Rfl: 2  •  losartan (COZAAR) 25 MG tablet, Take 1 tablet by mouth Daily., Disp: 90 tablet, Rfl: 3  •  montelukast (SINGULAIR) 10 MG tablet, TAKE ONE TABLET BY MOUTH ONCE DAILY AT BEDTIME, Disp: 90 tablet, Rfl: 2  •  propranolol (INDERAL) 40 MG tablet, Take 1 tablet by mouth Daily., Disp: 90  "tablet, Rfl: 3  •  simvastatin (ZOCOR) 20 MG tablet, Take 1 tablet by mouth Every Night., Disp: 90 tablet, Rfl: 4  •  traMADol (ULTRAM) 50 MG tablet, 1-2 tab po q right hour prn pain, Disp: 40 tablet, Rfl: 0    Past Surgical History:   Procedure Laterality Date   • HYSTERECTOMY     • TONSILLECTOMY         Family History   Problem Relation Age of Onset   • Alcohol abuse Other    • Asthma Other    • Breast cancer Other    • Diabetes Other    • Glaucoma Other    • Heart disease Other    • Hypertension Other    • Stroke Other        Social History     Socioeconomic History   • Marital status:      Spouse name: Not on file   • Number of children: Not on file   • Years of education: Not on file   • Highest education level: Not on file   Social Needs   • Financial resource strain: Not on file   • Food insecurity - worry: Not on file   • Food insecurity - inability: Not on file   • Transportation needs - medical: Not on file   • Transportation needs - non-medical: Not on file   Occupational History   • Not on file   Tobacco Use   • Smoking status: Current Every Day Smoker   • Smokeless tobacco: Never Used   Substance and Sexual Activity   • Alcohol use: Yes     Comment: social   • Drug use: No   • Sexual activity: Defer   Other Topics Concern   • Not on file   Social History Narrative   • Not on file        Review of Systems   Musculoskeletal: Positive for joint swelling.   Neurological: Positive for numbness.   All other systems reviewed and are negative.      PHYSICAL EXAMINATION:       Ht 157.5 cm (62\")   Wt 50.8 kg (112 lb)   BMI 20.49 kg/m²     Physical Exam   Constitutional: She is oriented to person, place, and time. Vital signs are normal. She appears well-developed and well-nourished. She is cooperative.   HENT:   Head: Normocephalic and atraumatic.   Neck: Trachea normal and phonation normal.   Pulmonary/Chest: Effort normal. No respiratory distress.   Abdominal: Soft. Normal appearance. She exhibits no " distension.   Neurological: She is alert and oriented to person, place, and time. GCS eye subscore is 4. GCS verbal subscore is 5. GCS motor subscore is 6.   Skin: Skin is warm, dry and intact. Capillary refill takes less than 2 seconds.   Psychiatric: She has a normal mood and affect. Her speech is normal and behavior is normal. Judgment and thought content normal. Cognition and memory are normal.   Vitals reviewed.      GAIT:     []  Normal  [x]  Antalgic    Assistive device: []  None  []  Walker     []  Crutches  []  Cane     []  Wheelchair  []  Stretcher    Right Hip Exam     Tenderness   The patient is experiencing tenderness in the greater trochanter.    Range of Motion   Abduction: normal   Flexion: normal   External rotation: normal   Internal rotation: normal     Muscle Strength   Abduction: 5/5   Adduction: 5/5   Flexion: 5/5     Other   Erythema: absent  Scars: absent  Sensation: normal  Pulse: present      Left Hip Exam     Tenderness   The patient is experiencing tenderness in the greater trochanter.    Range of Motion   Abduction: normal   Adduction: abnormal   Extension: normal   Flexion: normal   External rotation: normal   Internal rotation: normal     Muscle Strength   Abduction: 4/5   Adduction: 4/5   Flexion: 4/5     Tests   Fadir:  Positive FADIR test    Other   Erythema: absent  Scars: absent  Sensation: normal  Pulse: present    Comments:  Swelling lateral hip  Pain with adduction and extreme rotation                 PROCEDURE: AP pelvis and two views bilateral hips.     INDICATION: bilateral hip pain, M25.551 Pain in right hip M25.552  Pain in left hip     COMPARISON: 11/14/2013 left hip x-ray. 11/21/2013 nuclear  medicine whole body bone scan.     AP pelvis and bilateral AP and frog-leg lateral views of the  hips.     There is a stable 8 mm sclerotic density over the mid left SI  joint area probably in medial aspect left ilium. Unchanged from  the 11/14/2013 left hip x-ray. No abnormal  uptake in this area on  the previous 2013 bone scan. Consistent with benign sclerotic  density.     No new or acute osseous changes in the bony pelvis.     No fractures or acute osseous or old osseous traumatic changes in  either hip. Stable benign 4 mm sclerotic density  intertrochanteric region proximal left femur also with no uptake  on prior bone scan.     Both femoral heads normal density and smooth contours. Mild  degenerative arthritic changes bilaterally in the hips.     IMPRESSION:  Mild degenerative arthritic changes bilaterally in  the hips.     Stable benign sclerotic density medial left iliac bone detailed  above. Also stable small sclerotic benign density  intertrochanteric region left hip.        [id number]        Electronically signed by:  Kian Chen MD  10/4/2018 9:00  AM CDT Workstation: [email or web address]    No results found.        ASSESSMENT:    Diagnoses and all orders for this visit:    Acute hip pain, left  -     Cancel: MRI Hip Left Without Contrast; Future  -     MRI Hip Left Without Contrast; Future    Osteoporosis, unspecified osteoporosis type, unspecified pathological fracture presence  -     Cancel: MRI Hip Left Without Contrast; Future  -     MRI Hip Left Without Contrast; Future    Other orders  -     traMADol (ULTRAM) 50 MG tablet; 1-2 tab po q right hour prn pain          PLAN  Recommend an MRI of the left hip for further evaluation of lateral hip pain and tenderness which has failed to respond to conservative tx options.   Patient was given a short course of tramadol to take along with her anti-inflammatories gabapentin for pain relief while not at work.  No Follow-up on file.    RAJESH Lin

## 2018-12-06 DIAGNOSIS — M25.552 ACUTE HIP PAIN, LEFT: ICD-10-CM

## 2018-12-06 DIAGNOSIS — M81.0 OSTEOPOROSIS, UNSPECIFIED OSTEOPOROSIS TYPE, UNSPECIFIED PATHOLOGICAL FRACTURE PRESENCE: ICD-10-CM

## 2018-12-18 ENCOUNTER — OFFICE VISIT (OUTPATIENT)
Dept: ORTHOPEDIC SURGERY | Facility: CLINIC | Age: 63
End: 2018-12-18

## 2018-12-18 VITALS — WEIGHT: 112 LBS | HEIGHT: 62 IN | BODY MASS INDEX: 20.61 KG/M2

## 2018-12-18 DIAGNOSIS — M25.552 ACUTE HIP PAIN, LEFT: ICD-10-CM
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
## 2018-12-18 DIAGNOSIS — M54.50 LUMBAR SPINE PAIN: Primary | ICD-10-CM

## 2018-12-18 PROCEDURE — 99214 OFFICE O/P EST MOD 30 MIN: CPT | Performed by: NURSE PRACTITIONER

## 2018-12-18 RX ORDER — IBUPROFEN 800 MG/1
800 TABLET ORAL EVERY 6 HOURS PRN
COMMUNITY
End: 2020-07-01 | Stop reason: SDUPTHER

## 2018-12-18 NOTE — PROGRESS NOTES
"Jenn Lee is a 63 y.o. female returns for     Chief Complaint   Patient presents with   • Left Hip - Follow-up, Pain   • Results       HISTORY OF PRESENT ILLNESS:     63-year-old  female's in the office today for evaluation of her left hip pain.  Patient reports that she fell this morning and now she's experiencing increase in the pain.  Shoulder also reports some lumbar spine pain which is chronic in nature.  She reports weakness in the left lower extremity which had been present since a spinal fracture years ago.     CONCURRENT MEDICAL HISTORY:    The following portions of the patient's history were reviewed and updated as appropriate: allergies, current medications, past family history, past medical history, past social history, past surgical history and problem list.     ROS  No fevers or chills.  No chest pain or shortness of air.  No GI or  disturbances.    PHYSICAL EXAMINATION:       Ht 157.5 cm (62\")   Wt 50.8 kg (112 lb)   BMI 20.49 kg/m²     Physical Exam   Constitutional: She is oriented to person, place, and time. Vital signs are normal. She appears well-developed and well-nourished. She is cooperative.   HENT:   Head: Normocephalic and atraumatic.   Neck: Trachea normal and phonation normal.   Pulmonary/Chest: Effort normal. No respiratory distress.   Abdominal: Soft. Normal appearance. She exhibits no distension.   Neurological: She is alert and oriented to person, place, and time. GCS eye subscore is 4. GCS verbal subscore is 5. GCS motor subscore is 6.   Skin: Skin is warm, dry and intact. Capillary refill takes less than 2 seconds.   Psychiatric: She has a normal mood and affect. Her speech is normal and behavior is normal. Judgment and thought content normal. Cognition and memory are normal.   Vitals reviewed.      GAIT:     []  Normal  []  Antalgic    Assistive device: [x]  None  []  Walker     []  Crutches  []  Cane     []  Wheelchair  []  Stretcher    Right Hip Exam   Right " hip exam is normal.       Left Hip Exam     Tenderness   The patient is experiencing tenderness in the greater trochanter.    Range of Motion   Abduction: abnormal   Flexion: abnormal   External rotation: abnormal   Internal rotation: abnormal     Muscle Strength   Abduction: 4/5   Adduction: 4/5   Flexion: 4/5     Other   Erythema: absent  Scars: absent  Sensation: normal  Pulse: present    Comments:  Pain and tenderness noted over the greater trochanter with a notable hematoma.      Back Exam     Tenderness   The patient is experiencing tenderness in the sacroiliac and lumbar.    Range of Motion   Extension: abnormal   Flexion: abnormal   Lateral bend right: abnormal   Lateral bend left: abnormal   Rotation right: abnormal   Rotation left: abnormal     Muscle Strength   Right Quadriceps:  4/5   Left Quadriceps:  4/5   Right Hamstrings:  4/5   Left Hamstrings:  4/5     Tests   Straight leg raise right: negative  Straight leg raise left: positive    Other   Toe walk: abnormal  Heel walk: abnormal  Sensation: normal  Gait: antalgic   Erythema: no back redness  Scars: absent              Left hip   Impression  Negative left hip and pelvis. Nerve root compression is a possible source of hip pain.           ASSESSMENT:    Diagnoses and all orders for this visit:    Lumbar spine pain    Acute hip pain, left  -     XR Hip With or Without Pelvis 2 - 3 View Left; Future  -     MRI Lumbar Spine Without Contrast; Future    Fall, initial encounter    Other orders  -     ibuprofen (ADVIL,MOTRIN) 800 MG tablet; Take 800 mg by mouth Every 6 (Six) Hours As Needed for Mild Pain .          PLAN  Plan we'll be to recommend an MRI of the lumbar spine for further evaluation of chronic lumbar and left hip pain and an x-ray today to rule out an acute fracture after fall this morning.  Patient will need to continue to use ice on the left hip for treatment of acute pain since the fall, anti-inflammatories and she can take tramadol as  needed.  She may also benefit from modified weightbearing with a cane  No Follow-up on file.    Ezequiel King, APRN

## 2019-01-02 DIAGNOSIS — M25.552 ACUTE HIP PAIN, LEFT: ICD-10-CM

## 2019-01-08 ENCOUNTER — OFFICE VISIT (OUTPATIENT)
Dept: ORTHOPEDIC SURGERY | Facility: CLINIC | Age: 64
End: 2019-01-08

## 2019-01-08 VITALS — BODY MASS INDEX: 20.24 KG/M2 | HEIGHT: 62 IN | WEIGHT: 110 LBS

## 2019-01-08 DIAGNOSIS — M54.50 LUMBAR SPINE PAIN: ICD-10-CM

## 2019-01-08 DIAGNOSIS — M25.552 ACUTE HIP PAIN, LEFT: ICD-10-CM

## 2019-01-08 DIAGNOSIS — M70.62 TROCHANTERIC BURSITIS OF LEFT HIP: Primary | ICD-10-CM

## 2019-01-08 PROCEDURE — 99214 OFFICE O/P EST MOD 30 MIN: CPT | Performed by: NURSE PRACTITIONER

## 2019-01-08 PROCEDURE — 20610 DRAIN/INJ JOINT/BURSA W/O US: CPT | Performed by: NURSE PRACTITIONER

## 2019-01-08 RX ORDER — TRIAMCINOLONE ACETONIDE 40 MG/ML
80 INJECTION, SUSPENSION INTRA-ARTICULAR; INTRAMUSCULAR
Status: COMPLETED | OUTPATIENT
Start: 2019-01-08 | End: 2019-01-08

## 2019-01-08 RX ORDER — LIDOCAINE HYDROCHLORIDE 20 MG/ML
2 INJECTION, SOLUTION INFILTRATION; PERINEURAL
Status: COMPLETED | OUTPATIENT
Start: 2019-01-08 | End: 2019-01-08

## 2019-01-08 RX ADMIN — LIDOCAINE HYDROCHLORIDE 2 ML: 20 INJECTION, SOLUTION INFILTRATION; PERINEURAL at 11:54

## 2019-01-08 RX ADMIN — TRIAMCINOLONE ACETONIDE 80 MG: 40 INJECTION, SUSPENSION INTRA-ARTICULAR; INTRAMUSCULAR at 11:54

## 2019-01-08 NOTE — PROGRESS NOTES
"Jenn Lee is a 63 y.o. female returns for     Chief Complaint   Patient presents with   • Lumbar Spine - Follow-up, Pain   • Results       HISTORY OF PRESENT ILLNESS: f/u left hip and back pain, mri done on 12/28/2018 @ [facility].      CONCURRENT MEDICAL HISTORY:    The following portions of the patient's history were reviewed and updated as appropriate: allergies, current medications, past family history, past medical history, past social history, past surgical history and problem list.     ROS  No fevers or chills.  No chest pain or shortness of air.  No GI or  disturbances.    PHYSICAL EXAMINATION:       Ht 157.5 cm (62\")   Wt 49.9 kg (110 lb)   BMI 20.12 kg/m²     Physical Exam   Constitutional: She is oriented to person, place, and time. Vital signs are normal. She appears well-developed and well-nourished. She is cooperative.   HENT:   Head: Normocephalic and atraumatic.   Neck: Trachea normal and phonation normal.   Pulmonary/Chest: Effort normal. No respiratory distress.   Abdominal: Soft. Normal appearance. She exhibits no distension.   Neurological: She is alert and oriented to person, place, and time. GCS eye subscore is 4. GCS verbal subscore is 5. GCS motor subscore is 6.   Skin: Skin is warm, dry and intact. Capillary refill takes less than 2 seconds.   Psychiatric: She has a normal mood and affect. Her speech is normal and behavior is normal. Judgment and thought content normal. Cognition and memory are normal.   Vitals reviewed.      GAIT:     []  Normal  [x]  Antalgic    Assistive device: [x]  None  []  Walker     []  Crutches  []  Cane     []  Wheelchair  []  Stretcher    Right Hip Exam   Right hip exam is normal.       Left Hip Exam     Tenderness   The patient is experiencing tenderness in the greater trochanter.    Range of Motion   Abduction: abnormal   Flexion: abnormal   External rotation: abnormal   Internal rotation: abnormal     Muscle Strength   Abduction: 4/5 "   Adduction: 4/5   Flexion: 4/5     Other   Erythema: absent  Scars: absent  Sensation: normal  Pulse: present    Comments:  Pain and tenderness noted over the greater trochanter with a notable hematoma.      Back Exam     Tenderness   The patient is experiencing tenderness in the sacroiliac and lumbar.    Range of Motion   Extension: abnormal   Flexion: abnormal   Lateral bend right: abnormal   Lateral bend left: abnormal   Rotation right: abnormal   Rotation left: abnormal     Muscle Strength   Right Quadriceps:  4/5   Left Quadriceps:  4/5   Right Hamstrings:  4/5   Left Hamstrings:  4/5     Tests   Straight leg raise right: negative  Straight leg raise left: positive    Other   Toe walk: abnormal  Heel walk: abnormal  Sensation: normal  Gait: antalgic   Erythema: no back redness  Scars: absent              Xr Hip With Or Without Pelvis 2 - 3 View Left    Result Date: 12/18/2018  Narrative: AP of the pelvis with 2 views of the left hip reveals no fracture dislocation or other acute radiological abnormality noted when compared to previous films obtained earlier this year. 12/18/18 at 12:33 PM by RAJESH Lin       Lumbar spine 12/28/2018  Impression  Old compression fracture of T12  Essentially neg. l spine. In particular no source of left radicular complaint.       ASSESSMENT:    Diagnoses and all orders for this visit:    Trochanteric bursitis of left hip    Acute hip pain, left  -     Large Joint Arthrocentesis: L greater trochanteric bursa    Lumbar spine pain  -     Large Joint Arthrocentesis: L greater trochanteric bursa      Large Joint Arthrocentesis: L greater trochanteric bursa  Date/Time: 1/8/2019 11:54 AM  Consent given by: patient  Site marked: site marked  Timeout: Immediately prior to procedure a time out was called to verify the correct patient, procedure, equipment, support staff and site/side marked as required   Supporting Documentation  Indications: pain   Procedure  Details  Location: hip - L greater trochanteric bursa  Preparation: Patient was prepped and draped in the usual sterile fashion  Needle size: 22 G  Approach: lateral  Medications administered: 80 mg triamcinolone acetonide 40 MG/ML; 2 mL lidocaine 2%  Patient tolerance: patient tolerated the procedure well with no immediate complications            PLAN  No radiological evidence of disc herniation and/or nerve impingement noted on the MRI.  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  We discussed findings on the MRI of the hip and the lumbar spine in detail and I reexamined the patient recommending a trochanteric bursa injection which I feel is not detrimental at this time for osteoporosis at a isolated dose and follow-up with me in the office in 2-4 weeks for recheck.  She'll need to continue her home exercise program and pacer activity based on her pain.  No Follow-up on file.    Ezequiel King, APRN

## 2019-02-08 RX ORDER — GABAPENTIN 300 MG/1
CAPSULE ORAL
Qty: 60 CAPSULE | Refills: 1 | Status: SHIPPED | OUTPATIENT
Start: 2019-02-08 | End: 2020-03-05

## 2019-02-13 ENCOUNTER — CLINICAL SUPPORT (OUTPATIENT)
Dept: FAMILY MEDICINE CLINIC | Facility: CLINIC | Age: 64
End: 2019-02-13

## 2019-02-13 DIAGNOSIS — J06.9 URI, ACUTE: Primary | ICD-10-CM

## 2019-02-13 PROCEDURE — 96372 THER/PROPH/DIAG INJ SC/IM: CPT | Performed by: NURSE PRACTITIONER

## 2019-02-13 RX ADMIN — CEFTRIAXONE 1 G: 1 INJECTION, POWDER, FOR SOLUTION INTRAMUSCULAR; INTRAVENOUS at 11:10

## 2019-02-14 ENCOUNTER — TELEPHONE (OUTPATIENT)
Dept: FAMILY MEDICINE CLINIC | Facility: CLINIC | Age: 64
End: 2019-02-14

## 2019-02-14 RX ORDER — CEFTRIAXONE 1 G/1
1 INJECTION, POWDER, FOR SOLUTION INTRAMUSCULAR; INTRAVENOUS ONCE
Status: COMPLETED | OUTPATIENT
Start: 2019-02-14 | End: 2019-02-13

## 2019-03-04 RX ORDER — AMLODIPINE BESYLATE 5 MG/1
5 TABLET ORAL DAILY
Qty: 30 TABLET | Refills: 3 | Status: SHIPPED | OUTPATIENT
Start: 2019-03-04 | End: 2019-04-08 | Stop reason: SDUPTHER

## 2019-03-12 ENCOUNTER — OFFICE VISIT (OUTPATIENT)
Dept: ORTHOPEDIC SURGERY | Facility: CLINIC | Age: 64
End: 2019-03-12

## 2019-03-12 VITALS — BODY MASS INDEX: 20.61 KG/M2 | HEIGHT: 62 IN | WEIGHT: 112 LBS

## 2019-03-12 DIAGNOSIS — M25.552 ACUTE HIP PAIN, LEFT: ICD-10-CM

## 2019-03-12 DIAGNOSIS — M54.50 LUMBAR SPINE PAIN: ICD-10-CM

## 2019-03-12 DIAGNOSIS — M70.62 TROCHANTERIC BURSITIS OF LEFT HIP: Primary | ICD-10-CM

## 2019-03-12 PROCEDURE — 96372 THER/PROPH/DIAG INJ SC/IM: CPT | Performed by: NURSE PRACTITIONER

## 2019-03-12 RX ORDER — TRIAMCINOLONE ACETONIDE 40 MG/ML
60 INJECTION, SUSPENSION INTRA-ARTICULAR; INTRAMUSCULAR ONCE
Status: COMPLETED | OUTPATIENT
Start: 2019-03-12 | End: 2019-03-12

## 2019-03-12 RX ORDER — TRAMADOL HYDROCHLORIDE 50 MG/1
TABLET ORAL
Qty: 40 TABLET | Refills: 0 | Status: SHIPPED | OUTPATIENT
Start: 2019-03-12 | End: 2020-03-05

## 2019-03-12 RX ADMIN — TRIAMCINOLONE ACETONIDE 60 MG: 40 INJECTION, SUSPENSION INTRA-ARTICULAR; INTRAMUSCULAR at 09:37

## 2019-03-12 NOTE — PROGRESS NOTES
"Jenn Lee is a 64 y.o. 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  Last visit.  She reports she still utilizes the tramadol at night for pain and she takes Tylenol to supplement.  She is complaining more today of pain in the posterior part of the hip and SI joint area rather than the lateral hip pain.     CONCURRENT MEDICAL HISTORY:    The following portions of the patient's history were reviewed and updated as appropriate: allergies, current medications, past family history, past medical history, past social history, past surgical history and problem list.     ROS  No fevers or chills.  No chest pain or shortness of air.  No GI or  disturbances.    PHYSICAL EXAMINATION:       Ht 157.5 cm (62\")   Wt 50.8 kg (112 lb)   BMI 20.49 kg/m²     Physical Exam   Constitutional: She is oriented to person, place, and time. Vital signs are normal. She appears well-developed and well-nourished. She is cooperative.   HENT:   Head: Normocephalic and atraumatic.   Neck: Trachea normal and phonation normal.   Pulmonary/Chest: Effort normal. No respiratory distress.   Abdominal: Soft. Normal appearance. She exhibits no distension.   Neurological: She is alert and oriented to person, place, and time. GCS eye subscore is 4. GCS verbal subscore is 5. GCS motor subscore is 6.   Skin: Skin is warm, dry and intact. Capillary refill takes less than 2 seconds.   Psychiatric: She has a normal mood and affect. Her speech is normal and behavior is normal. Judgment and thought content normal. Cognition and memory are normal.   Vitals reviewed.      GAIT:     [x]  Normal  []  Antalgic    Assistive device: [x]  None  []  " Walker     []  Crutches  []  Cane     []  Wheelchair  []  Stretcher    Right Hip Exam   Right hip exam is normal.       Left Hip Exam     Tenderness   Left hip tenderness location: Left SI joint.    Range of Motion   Abduction: normal   Flexion: normal   External rotation: normal   Internal rotation: normal     Muscle Strength   Abduction: 4/5   Adduction: 4/5   Flexion: 4/5     Other   Erythema: absent  Scars: absent  Sensation: normal  Pulse: present      Back Exam     Tenderness   The patient is experiencing tenderness in the sacroiliac and lumbar.    Range of Motion   Extension: abnormal   Flexion: abnormal   Lateral bend right: abnormal   Lateral bend left: abnormal   Rotation right: abnormal   Rotation left: abnormal     Muscle Strength   Right Quadriceps:  4/5   Left Quadriceps:  4/5   Right Hamstrings:  4/5   Left Hamstrings:  4/5     Tests   Straight leg raise right: negative  Straight leg raise left: positive    Other   Toe walk: abnormal  Heel walk: abnormal  Sensation: normal  Gait: antalgic   Erythema: no back redness  Scars: absent              No results found.          ASSESSMENT:    Diagnoses and all orders for this visit:    Trochanteric bursitis of left hip  -     triamcinolone acetonide (KENALOG-40) injection 60 mg; Inject 1.5 mL into the appropriate muscle as directed by prescriber 1 (One) Time.    Acute hip pain, left  -     triamcinolone acetonide (KENALOG-40) injection 60 mg; Inject 1.5 mL into the appropriate muscle as directed by prescriber 1 (One) Time.    Lumbar spine pain  -     triamcinolone acetonide (KENALOG-40) injection 60 mg; Inject 1.5 mL into the appropriate muscle as directed by prescriber 1 (One) Time.    Other orders  -     traMADol (ULTRAM) 50 MG tablet; 1-2 tab po q right hour prn pain          PLAN  I discussed treatment options for chronic SI joint pain in detail recommending core strengthening exercises, possible physical therapy, I have administered IM injection of  Kenalog and recommended follow-up in 4-6 weeks for recheck.  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  No Follow-up on file.    RAJESH Lin

## 2019-04-08 RX ORDER — PROPRANOLOL HYDROCHLORIDE 40 MG/1
40 TABLET ORAL DAILY
Qty: 90 TABLET | Refills: 3 | Status: SHIPPED | OUTPATIENT
Start: 2019-04-08 | End: 2019-09-16 | Stop reason: SDUPTHER

## 2019-04-08 RX ORDER — AMLODIPINE BESYLATE 5 MG/1
5 TABLET ORAL DAILY
Qty: 90 TABLET | Refills: 3 | Status: SHIPPED | OUTPATIENT
Start: 2019-04-08 | End: 2019-05-29

## 2019-05-06 DIAGNOSIS — Z12.39 ENCOUNTER FOR BREAST CANCER SCREENING OTHER THAN MAMMOGRAM: Primary | ICD-10-CM

## 2019-05-13 DIAGNOSIS — Z12.39 ENCOUNTER FOR BREAST CANCER SCREENING OTHER THAN MAMMOGRAM: ICD-10-CM

## 2019-05-29 RX ORDER — AMLODIPINE BESYLATE 10 MG/1
10 TABLET ORAL DAILY
Qty: 90 TABLET | Refills: 3 | Status: SHIPPED | OUTPATIENT
Start: 2019-05-29 | End: 2019-09-16 | Stop reason: SDUPTHER

## 2019-06-03 RX ORDER — MONTELUKAST SODIUM 10 MG/1
TABLET ORAL
Qty: 90 TABLET | Refills: 2 | Status: SHIPPED | OUTPATIENT
Start: 2019-06-03 | End: 2019-09-16 | Stop reason: SDUPTHER

## 2019-09-16 RX ORDER — ALBUTEROL SULFATE 90 UG/1
2 AEROSOL, METERED RESPIRATORY (INHALATION) EVERY 4 HOURS PRN
Qty: 1 INHALER | Refills: 11 | Status: SHIPPED | OUTPATIENT
Start: 2019-09-16 | End: 2020-03-05 | Stop reason: SDUPTHER

## 2019-09-16 RX ORDER — SIMVASTATIN 20 MG
20 TABLET ORAL NIGHTLY
Qty: 90 TABLET | Refills: 3 | Status: SHIPPED | OUTPATIENT
Start: 2019-09-16 | End: 2020-03-05 | Stop reason: SDUPTHER

## 2019-09-16 RX ORDER — PROPRANOLOL HYDROCHLORIDE 40 MG/1
40 TABLET ORAL DAILY
Qty: 90 TABLET | Refills: 3 | Status: SHIPPED | OUTPATIENT
Start: 2019-09-16 | End: 2020-03-05 | Stop reason: SDUPTHER

## 2019-09-16 RX ORDER — MONTELUKAST SODIUM 10 MG/1
10 TABLET ORAL
Qty: 90 TABLET | Refills: 3 | Status: SHIPPED | OUTPATIENT
Start: 2019-09-16 | End: 2020-03-05 | Stop reason: SDUPTHER

## 2019-09-16 RX ORDER — AMLODIPINE BESYLATE 10 MG/1
10 TABLET ORAL DAILY
Qty: 90 TABLET | Refills: 3 | Status: SHIPPED | OUTPATIENT
Start: 2019-09-16 | End: 2020-03-05 | Stop reason: SDUPTHER

## 2019-09-16 RX ORDER — AMITRIPTYLINE HYDROCHLORIDE 25 MG/1
50 TABLET, FILM COATED ORAL NIGHTLY
Qty: 180 TABLET | Refills: 1 | Status: SHIPPED | OUTPATIENT
Start: 2019-09-16 | End: 2020-03-05 | Stop reason: SDUPTHER

## 2020-03-05 ENCOUNTER — OFFICE VISIT (OUTPATIENT)
Dept: FAMILY MEDICINE CLINIC | Facility: CLINIC | Age: 65
End: 2020-03-05

## 2020-03-05 ENCOUNTER — TELEPHONE (OUTPATIENT)
Dept: FAMILY MEDICINE CLINIC | Facility: CLINIC | Age: 65
End: 2020-03-05

## 2020-03-05 VITALS
DIASTOLIC BLOOD PRESSURE: 64 MMHG | BODY MASS INDEX: 18.11 KG/M2 | OXYGEN SATURATION: 98 % | SYSTOLIC BLOOD PRESSURE: 122 MMHG | WEIGHT: 98.4 LBS | HEART RATE: 82 BPM | HEIGHT: 62 IN | TEMPERATURE: 98.3 F

## 2020-03-05 DIAGNOSIS — M54.50 CHRONIC LOW BACK PAIN, UNSPECIFIED BACK PAIN LATERALITY, UNSPECIFIED WHETHER SCIATICA PRESENT: ICD-10-CM

## 2020-03-05 DIAGNOSIS — Z00.00 ANNUAL PHYSICAL EXAM: ICD-10-CM

## 2020-03-05 DIAGNOSIS — I10 ESSENTIAL HYPERTENSION: ICD-10-CM

## 2020-03-05 DIAGNOSIS — M25.50 MULTIPLE JOINT PAIN: Primary | ICD-10-CM

## 2020-03-05 DIAGNOSIS — E78.5 HYPERLIPIDEMIA, UNSPECIFIED HYPERLIPIDEMIA TYPE: ICD-10-CM

## 2020-03-05 DIAGNOSIS — Z00.00 GENERAL MEDICAL EXAMINATION: ICD-10-CM

## 2020-03-05 DIAGNOSIS — Z12.31 SCREENING MAMMOGRAM, ENCOUNTER FOR: ICD-10-CM

## 2020-03-05 DIAGNOSIS — M81.0 OSTEOPOROSIS, UNSPECIFIED OSTEOPOROSIS TYPE, UNSPECIFIED PATHOLOGICAL FRACTURE PRESENCE: ICD-10-CM

## 2020-03-05 DIAGNOSIS — Z72.0 TOBACCO USER: ICD-10-CM

## 2020-03-05 DIAGNOSIS — G89.29 CHRONIC LOW BACK PAIN, UNSPECIFIED BACK PAIN LATERALITY, UNSPECIFIED WHETHER SCIATICA PRESENT: ICD-10-CM

## 2020-03-05 DIAGNOSIS — Z11.59 NEED FOR HEPATITIS C SCREENING TEST: ICD-10-CM

## 2020-03-05 DIAGNOSIS — M16.0 PRIMARY OSTEOARTHRITIS OF BOTH HIPS: ICD-10-CM

## 2020-03-05 DIAGNOSIS — S22.000S CLOSED COMPRESSION FRACTURE OF THORACIC VERTEBRA, SEQUELA: ICD-10-CM

## 2020-03-05 DIAGNOSIS — Z71.6 TOBACCO ABUSE COUNSELING: ICD-10-CM

## 2020-03-05 PROCEDURE — 99214 OFFICE O/P EST MOD 30 MIN: CPT | Performed by: FAMILY MEDICINE

## 2020-03-05 RX ORDER — AMLODIPINE BESYLATE 10 MG/1
10 TABLET ORAL DAILY
Qty: 90 TABLET | Refills: 3 | Status: SHIPPED | OUTPATIENT
Start: 2020-03-05 | End: 2020-10-14 | Stop reason: SDUPTHER

## 2020-03-05 RX ORDER — PROPRANOLOL HYDROCHLORIDE 40 MG/1
40 TABLET ORAL DAILY
Qty: 90 TABLET | Refills: 3 | Status: SHIPPED | OUTPATIENT
Start: 2020-03-05 | End: 2020-10-14 | Stop reason: SDUPTHER

## 2020-03-05 RX ORDER — PROPRANOLOL HYDROCHLORIDE 40 MG/1
40 TABLET ORAL DAILY
Qty: 90 TABLET | Refills: 3 | Status: SHIPPED | OUTPATIENT
Start: 2020-03-05 | End: 2020-03-05 | Stop reason: SDUPTHER

## 2020-03-05 RX ORDER — SIMVASTATIN 20 MG
20 TABLET ORAL NIGHTLY
Qty: 90 TABLET | Refills: 3 | Status: SHIPPED | OUTPATIENT
Start: 2020-03-05 | End: 2020-03-05 | Stop reason: SDUPTHER

## 2020-03-05 RX ORDER — MONTELUKAST SODIUM 10 MG/1
10 TABLET ORAL
Qty: 90 TABLET | Refills: 3 | Status: SHIPPED | OUTPATIENT
Start: 2020-03-05 | End: 2020-03-05 | Stop reason: SDUPTHER

## 2020-03-05 RX ORDER — AMLODIPINE BESYLATE 10 MG/1
10 TABLET ORAL DAILY
Qty: 90 TABLET | Refills: 3 | Status: SHIPPED | OUTPATIENT
Start: 2020-03-05 | End: 2020-03-05 | Stop reason: SDUPTHER

## 2020-03-05 RX ORDER — SIMVASTATIN 20 MG
20 TABLET ORAL NIGHTLY
Qty: 90 TABLET | Refills: 3 | Status: SHIPPED | OUTPATIENT
Start: 2020-03-05 | End: 2020-10-14 | Stop reason: SDUPTHER

## 2020-03-05 RX ORDER — AMITRIPTYLINE HYDROCHLORIDE 25 MG/1
50 TABLET, FILM COATED ORAL NIGHTLY
Qty: 180 TABLET | Refills: 1 | Status: SHIPPED | OUTPATIENT
Start: 2020-03-05 | End: 2020-10-14 | Stop reason: SDUPTHER

## 2020-03-05 RX ORDER — MONTELUKAST SODIUM 10 MG/1
10 TABLET ORAL
Qty: 90 TABLET | Refills: 3 | Status: SHIPPED | OUTPATIENT
Start: 2020-03-05 | End: 2020-10-14 | Stop reason: SDUPTHER

## 2020-03-05 RX ORDER — AMITRIPTYLINE HYDROCHLORIDE 25 MG/1
50 TABLET, FILM COATED ORAL NIGHTLY
Qty: 180 TABLET | Refills: 1 | Status: SHIPPED | OUTPATIENT
Start: 2020-03-05 | End: 2020-03-05 | Stop reason: SDUPTHER

## 2020-03-05 RX ORDER — ALBUTEROL SULFATE 90 UG/1
2 AEROSOL, METERED RESPIRATORY (INHALATION) EVERY 4 HOURS PRN
Qty: 1 INHALER | Refills: 11 | Status: SHIPPED | OUTPATIENT
Start: 2020-03-05 | End: 2020-10-14 | Stop reason: SDUPTHER

## 2020-03-05 NOTE — PATIENT INSTRUCTIONS
Denosumab injection  What is this medicine?  DENOSUMAB (den oh drew mab) slows bone breakdown. Prolia is used to treat osteoporosis in women after menopause and in men, and in people who are taking corticosteroids for 6 months or more. Xgeva is used to treat a high calcium level due to cancer and to prevent bone fractures and other bone problems caused by multiple myeloma or cancer bone metastases. Xgeva is also used to treat giant cell tumor of the bone.  This medicine may be used for other purposes; ask your health care provider or pharmacist if you have questions.  COMMON BRAND NAME(S): Prolia, XGEVA  What should I tell my health care provider before I take this medicine?  They need to know if you have any of these conditions:  -dental disease  -having surgery or tooth extraction  -infection  -kidney disease  -low levels of calcium or Vitamin D in the blood  -malnutrition  -on hemodialysis  -skin conditions or sensitivity  -thyroid or parathyroid disease  -an unusual reaction to denosumab, other medicines, foods, dyes, or preservatives  -pregnant or trying to get pregnant  -breast-feeding  How should I use this medicine?  This medicine is for injection under the skin. It is given by a health care professional in a hospital or clinic setting.  A special MedGuide will be given to you before each treatment. Be sure to read this information carefully each time.  For Prolia, talk to your pediatrician regarding the use of this medicine in children. Special care may be needed. For Xgeva, talk to your pediatrician regarding the use of this medicine in children. While this drug may be prescribed for children as young as 13 years for selected conditions, precautions do apply.  Overdosage: If you think you have taken too much of this medicine contact a poison control center or emergency room at once.  NOTE: This medicine is only for you. Do not share this medicine with others.  What if I miss a dose?  It is important not to  miss your dose. Call your doctor or health care professional if you are unable to keep an appointment.  What may interact with this medicine?  Do not take this medicine with any of the following medications:  -other medicines containing denosumab  This medicine may also interact with the following medications:  -medicines that lower your chance of fighting infection  -steroid medicines like prednisone or cortisone  This list may not describe all possible interactions. Give your health care provider a list of all the medicines, herbs, non-prescription drugs, or dietary supplements you use. Also tell them if you smoke, drink alcohol, or use illegal drugs. Some items may interact with your medicine.  What should I watch for while using this medicine?  Visit your doctor or health care professional for regular checks on your progress. Your doctor or health care professional may order blood tests and other tests to see how you are doing.  Call your doctor or health care professional for advice if you get a fever, chills or sore throat, or other symptoms of a cold or flu. Do not treat yourself. This drug may decrease your body's ability to fight infection. Try to avoid being around people who are sick.  You should make sure you get enough calcium and vitamin D while you are taking this medicine, unless your doctor tells you not to. Discuss the foods you eat and the vitamins you take with your health care professional.  See your dentist regularly. Brush and floss your teeth as directed. Before you have any dental work done, tell your dentist you are receiving this medicine.  Do not become pregnant while taking this medicine or for 5 months after stopping it. Talk with your doctor or health care professional about your birth control options while taking this medicine. Women should inform their doctor if they wish to become pregnant or think they might be pregnant. There is a potential for serious side effects to an unborn  child. Talk to your health care professional or pharmacist for more information.  What side effects may I notice from receiving this medicine?  Side effects that you should report to your doctor or health care professional as soon as possible:  -allergic reactions like skin rash, itching or hives, swelling of the face, lips, or tongue  -bone pain  -breathing problems  -dizziness  -jaw pain, especially after dental work  -redness, blistering, peeling of the skin  -signs and symptoms of infection like fever or chills; cough; sore throat; pain or trouble passing urine  -signs of low calcium like fast heartbeat, muscle cramps or muscle pain; pain, tingling, numbness in the hands or feet; seizures  -unusual bleeding or bruising  -unusually weak or tired  Side effects that usually do not require medical attention (report to your doctor or health care professional if they continue or are bothersome):  -constipation  -diarrhea  -headache  -joint pain  -loss of appetite  -muscle pain  -runny nose  -tiredness  -upset stomach  This list may not describe all possible side effects. Call your doctor for medical advice about side effects. You may report side effects to FDA at 6-768-FDA-1211.  Where should I keep my medicine?  This medicine is only given in a clinic, doctor's office, or other health care setting and will not be stored at home.  NOTE: This sheet is a summary. It may not cover all possible information. If you have questions about this medicine, talk to your doctor, pharmacist, or health care provider.  © 2020 [employer] (2019-04-26 16:10:44)  Denosumab injection  What is this medicine?  DENOSUMAB (den oh drew mab) slows bone breakdown. Prolia is used to treat osteoporosis in women after menopause and in men, and in people who are taking corticosteroids for 6 months or more. Xgeva is used to treat a high calcium level due to cancer and to prevent bone fractures and other bone problems caused by multiple  myeloma or cancer bone metastases. Xgeva is also used to treat giant cell tumor of the bone.  This medicine may be used for other purposes; ask your health care provider or pharmacist if you have questions.  COMMON BRAND NAME(S): Prolia, XGEVA  What should I tell my health care provider before I take this medicine?  They need to know if you have any of these conditions:  -dental disease  -having surgery or tooth extraction  -infection  -kidney disease  -low levels of calcium or Vitamin D in the blood  -malnutrition  -on hemodialysis  -skin conditions or sensitivity  -thyroid or parathyroid disease  -an unusual reaction to denosumab, other medicines, foods, dyes, or preservatives  -pregnant or trying to get pregnant  -breast-feeding  How should I use this medicine?  This medicine is for injection under the skin. It is given by a health care professional in a hospital or clinic setting.  A special MedGuide will be given to you before each treatment. Be sure to read this information carefully each time.  For Prolia, talk to your pediatrician regarding the use of this medicine in children. Special care may be needed. For Xgeva, talk to your pediatrician regarding the use of this medicine in children. While this drug may be prescribed for children as young as 13 years for selected conditions, precautions do apply.  Overdosage: If you think you have taken too much of this medicine contact a poison control center or emergency room at once.  NOTE: This medicine is only for you. Do not share this medicine with others.  What if I miss a dose?  It is important not to miss your dose. Call your doctor or health care professional if you are unable to keep an appointment.  What may interact with this medicine?  Do not take this medicine with any of the following medications:  -other medicines containing denosumab  This medicine may also interact with the following medications:  -medicines that lower your chance of fighting  infection  -steroid medicines like prednisone or cortisone  This list may not describe all possible interactions. Give your health care provider a list of all the medicines, herbs, non-prescription drugs, or dietary supplements you use. Also tell them if you smoke, drink alcohol, or use illegal drugs. Some items may interact with your medicine.  What should I watch for while using this medicine?  Visit your doctor or health care professional for regular checks on your progress. Your doctor or health care professional may order blood tests and other tests to see how you are doing.  Call your doctor or health care professional for advice if you get a fever, chills or sore throat, or other symptoms of a cold or flu. Do not treat yourself. This drug may decrease your body's ability to fight infection. Try to avoid being around people who are sick.  You should make sure you get enough calcium and vitamin D while you are taking this medicine, unless your doctor tells you not to. Discuss the foods you eat and the vitamins you take with your health care professional.  See your dentist regularly. Brush and floss your teeth as directed. Before you have any dental work done, tell your dentist you are receiving this medicine.  Do not become pregnant while taking this medicine or for 5 months after stopping it. Talk with your doctor or health care professional about your birth control options while taking this medicine. Women should inform their doctor if they wish to become pregnant or think they might be pregnant. There is a potential for serious side effects to an unborn child. Talk to your health care professional or pharmacist for more information.  What side effects may I notice from receiving this medicine?  Side effects that you should report to your doctor or health care professional as soon as possible:  -allergic reactions like skin rash, itching or hives, swelling of the face, lips, or tongue  -bone pain  -breathing  problems  -dizziness  -jaw pain, especially after dental work  -redness, blistering, peeling of the skin  -signs and symptoms of infection like fever or chills; cough; sore throat; pain or trouble passing urine  -signs of low calcium like fast heartbeat, muscle cramps or muscle pain; pain, tingling, numbness in the hands or feet; seizures  -unusual bleeding or bruising  -unusually weak or tired  Side effects that usually do not require medical attention (report to your doctor or health care professional if they continue or are bothersome):  -constipation  -diarrhea  -headache  -joint pain  -loss of appetite  -muscle pain  -runny nose  -tiredness  -upset stomach  This list may not describe all possible side effects. Call your doctor for medical advice about side effects. You may report side effects to FDA at 1-196-FDA-6532.  Where should I keep my medicine?  This medicine is only given in a clinic, doctor's office, or other health care setting and will not be stored at home.  NOTE: This sheet is a summary. It may not cover all possible information. If you have questions about this medicine, talk to your doctor, pharmacist, or health care provider.  © 2020 [employer] (2019-04-26 16:10:44)

## 2020-03-05 NOTE — TELEPHONE ENCOUNTER
I will see what labwork is first to check kidney function before starting fosmax. Let pt know. Thanks

## 2020-03-05 NOTE — TELEPHONE ENCOUNTER
Jenn called and said that the shot was going to cost $800 and is asking if you would send in the fosmax to [facility]

## 2020-03-09 ENCOUNTER — LAB (OUTPATIENT)
Dept: LAB | Facility: HOSPITAL | Age: 65
End: 2020-03-09

## 2020-03-09 DIAGNOSIS — Z11.59 NEED FOR HEPATITIS C SCREENING TEST: ICD-10-CM

## 2020-03-09 DIAGNOSIS — M25.50 MULTIPLE JOINT PAIN: ICD-10-CM

## 2020-03-09 DIAGNOSIS — Z00.00 GENERAL MEDICAL EXAMINATION: ICD-10-CM

## 2020-03-09 LAB
ALBUMIN SERPL-MCNC: 4.3 G/DL (ref 3.5–5.2)
ALBUMIN/GLOB SERPL: 1.4 G/DL
ALP SERPL-CCNC: 165 U/L (ref 39–117)
ALT SERPL W P-5'-P-CCNC: 9 U/L (ref 1–33)
ANION GAP SERPL CALCULATED.3IONS-SCNC: 15 MMOL/L (ref 5–15)
AST SERPL-CCNC: 17 U/L (ref 1–32)
BASOPHILS # BLD AUTO: 0.06 10*3/MM3 (ref 0–0.2)
BASOPHILS NFR BLD AUTO: 0.5 % (ref 0–1.5)
BILIRUB SERPL-MCNC: 0.3 MG/DL (ref 0.2–1.2)
BUN BLD-MCNC: 10 MG/DL (ref 8–23)
BUN/CREAT SERPL: 13.2 (ref 7–25)
CALCIUM SPEC-SCNC: 10.1 MG/DL (ref 8.6–10.5)
CHLORIDE SERPL-SCNC: 102 MMOL/L (ref 98–107)
CHOLEST SERPL-MCNC: 130 MG/DL (ref 0–200)
CHROMATIN AB SERPL-ACNC: <10 IU/ML (ref 0–14)
CO2 SERPL-SCNC: 25 MMOL/L (ref 22–29)
CREAT BLD-MCNC: 0.76 MG/DL (ref 0.57–1)
CRP SERPL-MCNC: 0.27 MG/DL (ref 0–0.5)
DEPRECATED RDW RBC AUTO: 45 FL (ref 37–54)
EOSINOPHIL # BLD AUTO: 0.19 10*3/MM3 (ref 0–0.4)
EOSINOPHIL NFR BLD AUTO: 1.6 % (ref 0.3–6.2)
ERYTHROCYTE [DISTWIDTH] IN BLOOD BY AUTOMATED COUNT: 13 % (ref 12.3–15.4)
ERYTHROCYTE [SEDIMENTATION RATE] IN BLOOD: 33 MM/HR (ref 0–20)
GFR SERPL CREATININE-BSD FRML MDRD: 76 ML/MIN/1.73
GLOBULIN UR ELPH-MCNC: 3 GM/DL
GLUCOSE BLD-MCNC: 90 MG/DL (ref 65–99)
HAV IGM SERPL QL IA: NORMAL
HBA1C MFR BLD: 5.1 % (ref 4.8–5.6)
HBV CORE IGM SERPL QL IA: NORMAL
HBV SURFACE AG SERPL QL IA: NORMAL
HCT VFR BLD AUTO: 42.9 % (ref 34–46.6)
HCV AB SER DONR QL: NORMAL
HDLC SERPL-MCNC: 40 MG/DL (ref 40–60)
HGB BLD-MCNC: 14.3 G/DL (ref 12–15.9)
HOLD SPECIMEN: NORMAL
IMM GRANULOCYTES # BLD AUTO: 0.03 10*3/MM3 (ref 0–0.05)
IMM GRANULOCYTES NFR BLD AUTO: 0.3 % (ref 0–0.5)
LDLC SERPL CALC-MCNC: 71 MG/DL (ref 0–100)
LDLC/HDLC SERPL: 1.78 {RATIO}
LYMPHOCYTES # BLD AUTO: 3.17 10*3/MM3 (ref 0.7–3.1)
LYMPHOCYTES NFR BLD AUTO: 26.8 % (ref 19.6–45.3)
MCH RBC QN AUTO: 31.6 PG (ref 26.6–33)
MCHC RBC AUTO-ENTMCNC: 33.3 G/DL (ref 31.5–35.7)
MCV RBC AUTO: 94.7 FL (ref 79–97)
MONOCYTES # BLD AUTO: 0.63 10*3/MM3 (ref 0.1–0.9)
MONOCYTES NFR BLD AUTO: 5.3 % (ref 5–12)
NEUTROPHILS # BLD AUTO: 7.74 10*3/MM3 (ref 1.7–7)
NEUTROPHILS NFR BLD AUTO: 65.5 % (ref 42.7–76)
NRBC BLD AUTO-RTO: 0 /100 WBC (ref 0–0.2)
PLATELET # BLD AUTO: 324 10*3/MM3 (ref 140–450)
PMV BLD AUTO: 10.7 FL (ref 6–12)
POTASSIUM BLD-SCNC: 4 MMOL/L (ref 3.5–5.2)
PROT SERPL-MCNC: 7.3 G/DL (ref 6–8.5)
RBC # BLD AUTO: 4.53 10*6/MM3 (ref 3.77–5.28)
SODIUM BLD-SCNC: 142 MMOL/L (ref 136–145)
TRIGL SERPL-MCNC: 94 MG/DL (ref 0–150)
TSH SERPL DL<=0.05 MIU/L-ACNC: 1.77 UIU/ML (ref 0.27–4.2)
URATE SERPL-MCNC: 3.6 MG/DL (ref 2.4–5.7)
VLDLC SERPL-MCNC: 18.8 MG/DL
WBC NRBC COR # BLD: 11.82 10*3/MM3 (ref 3.4–10.8)

## 2020-03-09 PROCEDURE — 84550 ASSAY OF BLOOD/URIC ACID: CPT

## 2020-03-09 PROCEDURE — 83036 HEMOGLOBIN GLYCOSYLATED A1C: CPT

## 2020-03-09 PROCEDURE — 86235 NUCLEAR ANTIGEN ANTIBODY: CPT

## 2020-03-09 PROCEDURE — 84443 ASSAY THYROID STIM HORMONE: CPT

## 2020-03-09 PROCEDURE — 86038 ANTINUCLEAR ANTIBODIES: CPT

## 2020-03-09 PROCEDURE — 80074 ACUTE HEPATITIS PANEL: CPT

## 2020-03-09 PROCEDURE — 85025 COMPLETE CBC W/AUTO DIFF WBC: CPT

## 2020-03-09 PROCEDURE — 80061 LIPID PANEL: CPT

## 2020-03-09 PROCEDURE — 86431 RHEUMATOID FACTOR QUANT: CPT

## 2020-03-09 PROCEDURE — 86140 C-REACTIVE PROTEIN: CPT

## 2020-03-09 PROCEDURE — 85651 RBC SED RATE NONAUTOMATED: CPT

## 2020-03-09 PROCEDURE — 80053 COMPREHEN METABOLIC PANEL: CPT

## 2020-03-09 PROCEDURE — 86225 DNA ANTIBODY NATIVE: CPT

## 2020-03-10 LAB
ANA SER QL: NEGATIVE
CENTROMERE B AB SER-ACNC: <0.2 AI (ref 0–0.9)
CHROMATIN AB SERPL-ACNC: <0.2 AI (ref 0–0.9)
DSDNA AB SER-ACNC: <1 IU/ML (ref 0–9)
ENA JO1 AB SER-ACNC: <0.2 AI (ref 0–0.9)
ENA RNP AB SER-ACNC: 0.4 AI (ref 0–0.9)
ENA SCL70 AB SER-ACNC: <0.2 AI (ref 0–0.9)
ENA SM AB SER-ACNC: <0.2 AI (ref 0–0.9)
ENA SS-A AB SER-ACNC: <0.2 AI (ref 0–0.9)
ENA SS-B AB SER-ACNC: <0.2 AI (ref 0–0.9)
Lab: NORMAL

## 2020-03-13 LAB — HLA-B27 QL NAA+PROBE: NEGATIVE

## 2020-03-23 ENCOUNTER — TELEPHONE (OUTPATIENT)
Dept: FAMILY MEDICINE CLINIC | Facility: CLINIC | Age: 65
End: 2020-03-23

## 2020-03-23 RX ORDER — ALENDRONATE SODIUM 70 MG/1
70 TABLET ORAL
Qty: 4 TABLET | Refills: 3 | Status: SHIPPED | OUTPATIENT
Start: 2020-03-23 | End: 2020-07-01 | Stop reason: ALTCHOICE

## 2020-03-23 NOTE — TELEPHONE ENCOUNTER
Gave pt results and recommendations. Pt would like to know if she could have fosamax sent to the pharmacy.

## 2020-03-23 NOTE — TELEPHONE ENCOUNTER
----- Message from Diego Gusman MD sent at 3/12/2020  2:34 PM CDT -----  Please call pt    All labwork stable. She does have elevated alkaline phosphtase elevated likely due to her underlying bone issues.  Make sure pt is taking caclium like citracal OtC along with Vitamin D3 50,000 units once a week give 4 pills and 3 refills    Her rheumatoid panel so far is negative. Although ESR or inflammatory marker is elevated    Her CBC shows elevated WBC with elevated neutrophil count. On last visit pt denied any infection or fever    Awaiting rest of labwork to call back    Recheck on next visit. Thanks

## 2020-03-23 NOTE — TELEPHONE ENCOUNTER
----- Message from Diego Gusman MD sent at 3/13/2020  5:45 PM CDT -----  HLA b27 negative. Pt does not have labwork to suggest connective tissue disorder. Thanks

## 2020-05-14 ENCOUNTER — TELEPHONE (OUTPATIENT)
Dept: FAMILY MEDICINE CLINIC | Facility: CLINIC | Age: 65
End: 2020-05-14

## 2020-05-14 DIAGNOSIS — M81.0 OSTEOPOROSIS, UNSPECIFIED OSTEOPOROSIS TYPE, UNSPECIFIED PATHOLOGICAL FRACTURE PRESENCE: ICD-10-CM

## 2020-05-14 RX ORDER — ERGOCALCIFEROL 1.25 MG/1
50000 CAPSULE ORAL
Qty: 4 CAPSULE | Refills: 3 | Status: SHIPPED | OUTPATIENT
Start: 2020-05-14 | End: 2020-09-17

## 2020-05-14 NOTE — TELEPHONE ENCOUNTER
Gave pt results and recommendations. Pt stated that her insurance will not cover prolia injections. Pt is agreeable to start taking calcium and vitamin D and would like it sent to the pharmacy. Pt stated she has already been to the bone clinic.

## 2020-05-14 NOTE — TELEPHONE ENCOUNTER
----- Message from Diego Gusman MD sent at 5/13/2020  2:52 PM CDT -----  Regarding: DEXA scan   Please call pt    Let pt know that recent DEXA scan showed her osteoporosis getting worse on recent one compared to one in 2018. Left hip and lumbar spine getting worse.  I'd recommend possibly stopping the fosamax and starting on prolia injections once every 6 months along with calcium and vitamin D supplementation. Can also refer to bone clinic in [place] if pt interested. Let me know what pt decides    i'm afraid pt may sustain a fall and easily fracture bone in spine or hip. Thanks

## 2020-05-19 ENCOUNTER — TELEPHONE (OUTPATIENT)
Dept: FAMILY MEDICINE CLINIC | Facility: CLINIC | Age: 65
End: 2020-05-19

## 2020-05-19 DIAGNOSIS — Z12.31 SCREENING MAMMOGRAM, ENCOUNTER FOR: ICD-10-CM

## 2020-05-19 NOTE — TELEPHONE ENCOUNTER
----- Message from Diego Gusman MD sent at 5/19/2020  7:32 AM CDT -----  Regarding: mammogram   Please let pt know mammogram done on 5/13/20 was normal    Repeat in 1 year. Thanks

## 2020-07-01 ENCOUNTER — OFFICE VISIT (OUTPATIENT)
Dept: FAMILY MEDICINE CLINIC | Facility: CLINIC | Age: 65
End: 2020-07-01

## 2020-07-01 VITALS
WEIGHT: 96.4 LBS | DIASTOLIC BLOOD PRESSURE: 58 MMHG | BODY MASS INDEX: 17.74 KG/M2 | OXYGEN SATURATION: 96 % | HEIGHT: 62 IN | HEART RATE: 66 BPM | SYSTOLIC BLOOD PRESSURE: 118 MMHG | TEMPERATURE: 97.9 F

## 2020-07-01 DIAGNOSIS — G89.29 CHRONIC BILATERAL THORACIC BACK PAIN: ICD-10-CM

## 2020-07-01 DIAGNOSIS — I10 ESSENTIAL HYPERTENSION: Primary | ICD-10-CM

## 2020-07-01 DIAGNOSIS — E78.5 HYPERLIPIDEMIA, UNSPECIFIED HYPERLIPIDEMIA TYPE: ICD-10-CM

## 2020-07-01 DIAGNOSIS — E43 SEVERE PROTEIN-CALORIE MALNUTRITION (HCC): ICD-10-CM

## 2020-07-01 DIAGNOSIS — M81.0 OSTEOPOROSIS, UNSPECIFIED OSTEOPOROSIS TYPE, UNSPECIFIED PATHOLOGICAL FRACTURE PRESENCE: ICD-10-CM

## 2020-07-01 DIAGNOSIS — Z72.0 TOBACCO USER: ICD-10-CM

## 2020-07-01 DIAGNOSIS — M54.6 CHRONIC BILATERAL THORACIC BACK PAIN: ICD-10-CM

## 2020-07-01 DIAGNOSIS — S22.000S CLOSED COMPRESSION FRACTURE OF THORACIC VERTEBRA, SEQUELA: ICD-10-CM

## 2020-07-01 PROCEDURE — 99214 OFFICE O/P EST MOD 30 MIN: CPT | Performed by: FAMILY MEDICINE

## 2020-07-01 RX ORDER — VIT C/B6/B5/MAGNESIUM/HERB 173 50-5-6-5MG
500 CAPSULE ORAL DAILY
Qty: 30 CAPSULE | Refills: 3 | Status: SHIPPED | OUTPATIENT
Start: 2020-07-01 | End: 2023-02-16

## 2020-07-01 RX ORDER — IBUPROFEN 800 MG/1
800 TABLET ORAL EVERY 6 HOURS PRN
Qty: 120 TABLET | Refills: 2 | Status: SHIPPED | OUTPATIENT
Start: 2020-07-01 | End: 2021-01-06

## 2020-07-01 RX ORDER — FLUTICASONE PROPIONATE 50 MCG
2 SPRAY, SUSPENSION (ML) NASAL DAILY
Qty: 1 BOTTLE | Refills: 3 | Status: SHIPPED | OUTPATIENT
Start: 2020-07-01 | End: 2020-10-14 | Stop reason: SDUPTHER

## 2020-09-17 RX ORDER — ERGOCALCIFEROL 1.25 MG/1
CAPSULE ORAL
Qty: 16 CAPSULE | Refills: 0 | Status: SHIPPED | OUTPATIENT
Start: 2020-09-17 | End: 2020-10-14 | Stop reason: SDUPTHER

## 2020-10-12 ENCOUNTER — LAB (OUTPATIENT)
Dept: LAB | Facility: HOSPITAL | Age: 65
End: 2020-10-12

## 2020-10-12 DIAGNOSIS — S22.000S CLOSED COMPRESSION FRACTURE OF THORACIC VERTEBRA, SEQUELA: ICD-10-CM

## 2020-10-12 DIAGNOSIS — G89.29 CHRONIC BILATERAL THORACIC BACK PAIN: ICD-10-CM

## 2020-10-12 DIAGNOSIS — M81.0 OSTEOPOROSIS, UNSPECIFIED OSTEOPOROSIS TYPE, UNSPECIFIED PATHOLOGICAL FRACTURE PRESENCE: ICD-10-CM

## 2020-10-12 DIAGNOSIS — I10 ESSENTIAL HYPERTENSION: ICD-10-CM

## 2020-10-12 DIAGNOSIS — M54.6 CHRONIC BILATERAL THORACIC BACK PAIN: ICD-10-CM

## 2020-10-12 DIAGNOSIS — E43 SEVERE PROTEIN-CALORIE MALNUTRITION (HCC): ICD-10-CM

## 2020-10-12 DIAGNOSIS — E78.5 HYPERLIPIDEMIA, UNSPECIFIED HYPERLIPIDEMIA TYPE: ICD-10-CM

## 2020-10-12 DIAGNOSIS — Z72.0 TOBACCO USER: ICD-10-CM

## 2020-10-12 LAB
25(OH)D3 SERPL-MCNC: 43.6 NG/ML (ref 30–100)
ALBUMIN SERPL-MCNC: 4.2 G/DL (ref 3.5–5.2)
ALBUMIN/GLOB SERPL: 1.6 G/DL
ALP SERPL-CCNC: 109 U/L (ref 39–117)
ALT SERPL W P-5'-P-CCNC: 12 U/L (ref 1–33)
ANION GAP SERPL CALCULATED.3IONS-SCNC: 9.2 MMOL/L (ref 5–15)
AST SERPL-CCNC: 14 U/L (ref 1–32)
BASOPHILS # BLD AUTO: 0.06 10*3/MM3 (ref 0–0.2)
BASOPHILS NFR BLD AUTO: 0.5 % (ref 0–1.5)
BILIRUB SERPL-MCNC: 0.2 MG/DL (ref 0–1.2)
BUN SERPL-MCNC: 16 MG/DL (ref 8–23)
BUN/CREAT SERPL: 21.1 (ref 7–25)
CALCIUM SPEC-SCNC: 10 MG/DL (ref 8.6–10.5)
CHLORIDE SERPL-SCNC: 105 MMOL/L (ref 98–107)
CHOLEST SERPL-MCNC: 145 MG/DL (ref 0–200)
CO2 SERPL-SCNC: 25.8 MMOL/L (ref 22–29)
CREAT SERPL-MCNC: 0.76 MG/DL (ref 0.57–1)
DEPRECATED RDW RBC AUTO: 41.3 FL (ref 37–54)
EOSINOPHIL # BLD AUTO: 0.24 10*3/MM3 (ref 0–0.4)
EOSINOPHIL NFR BLD AUTO: 2.1 % (ref 0.3–6.2)
ERYTHROCYTE [DISTWIDTH] IN BLOOD BY AUTOMATED COUNT: 12.4 % (ref 12.3–15.4)
GFR SERPL CREATININE-BSD FRML MDRD: 76 ML/MIN/1.73
GLOBULIN UR ELPH-MCNC: 2.7 GM/DL
GLUCOSE SERPL-MCNC: 73 MG/DL (ref 65–99)
HCT VFR BLD AUTO: 38 % (ref 34–46.6)
HDLC SERPL-MCNC: 46 MG/DL (ref 40–60)
HGB BLD-MCNC: 13.5 G/DL (ref 12–15.9)
IMM GRANULOCYTES # BLD AUTO: 0.04 10*3/MM3 (ref 0–0.05)
IMM GRANULOCYTES NFR BLD AUTO: 0.4 % (ref 0–0.5)
LDLC SERPL CALC-MCNC: 80 MG/DL (ref 0–100)
LDLC/HDLC SERPL: 1.7 {RATIO}
LYMPHOCYTES # BLD AUTO: 3.5 10*3/MM3 (ref 0.7–3.1)
LYMPHOCYTES NFR BLD AUTO: 31.1 % (ref 19.6–45.3)
MCH RBC QN AUTO: 32.8 PG (ref 26.6–33)
MCHC RBC AUTO-ENTMCNC: 35.5 G/DL (ref 31.5–35.7)
MCV RBC AUTO: 92.5 FL (ref 79–97)
MONOCYTES # BLD AUTO: 0.74 10*3/MM3 (ref 0.1–0.9)
MONOCYTES NFR BLD AUTO: 6.6 % (ref 5–12)
NEUTROPHILS NFR BLD AUTO: 59.3 % (ref 42.7–76)
NEUTROPHILS NFR BLD AUTO: 6.66 10*3/MM3 (ref 1.7–7)
NRBC BLD AUTO-RTO: 0.1 /100 WBC (ref 0–0.2)
PLATELET # BLD AUTO: 340 10*3/MM3 (ref 140–450)
PMV BLD AUTO: 10.6 FL (ref 6–12)
POTASSIUM SERPL-SCNC: 4.7 MMOL/L (ref 3.5–5.2)
PROT SERPL-MCNC: 6.9 G/DL (ref 6–8.5)
RBC # BLD AUTO: 4.11 10*6/MM3 (ref 3.77–5.28)
SODIUM SERPL-SCNC: 140 MMOL/L (ref 136–145)
TRIGL SERPL-MCNC: 105 MG/DL (ref 0–150)
VLDLC SERPL-MCNC: 19 MG/DL (ref 5–40)
WBC # BLD AUTO: 11.24 10*3/MM3 (ref 3.4–10.8)

## 2020-10-12 PROCEDURE — 80061 LIPID PANEL: CPT

## 2020-10-12 PROCEDURE — 82306 VITAMIN D 25 HYDROXY: CPT

## 2020-10-12 PROCEDURE — 80053 COMPREHEN METABOLIC PANEL: CPT

## 2020-10-12 PROCEDURE — 85025 COMPLETE CBC W/AUTO DIFF WBC: CPT

## 2020-10-14 ENCOUNTER — OFFICE VISIT (OUTPATIENT)
Dept: FAMILY MEDICINE CLINIC | Facility: CLINIC | Age: 65
End: 2020-10-14

## 2020-10-14 VITALS
TEMPERATURE: 96.9 F | BODY MASS INDEX: 17.23 KG/M2 | HEART RATE: 72 BPM | SYSTOLIC BLOOD PRESSURE: 100 MMHG | DIASTOLIC BLOOD PRESSURE: 56 MMHG | HEIGHT: 62 IN | WEIGHT: 93.6 LBS | OXYGEN SATURATION: 96 %

## 2020-10-14 DIAGNOSIS — G43.809 OTHER MIGRAINE WITHOUT STATUS MIGRAINOSUS, NOT INTRACTABLE: ICD-10-CM

## 2020-10-14 DIAGNOSIS — I10 ESSENTIAL HYPERTENSION: ICD-10-CM

## 2020-10-14 DIAGNOSIS — G89.29 CHRONIC THORACIC BACK PAIN, UNSPECIFIED BACK PAIN LATERALITY: ICD-10-CM

## 2020-10-14 DIAGNOSIS — N18.2 STAGE 2 CHRONIC KIDNEY DISEASE: ICD-10-CM

## 2020-10-14 DIAGNOSIS — Z23 NEED FOR IMMUNIZATION AGAINST INFLUENZA: ICD-10-CM

## 2020-10-14 DIAGNOSIS — M54.6 CHRONIC THORACIC BACK PAIN, UNSPECIFIED BACK PAIN LATERALITY: ICD-10-CM

## 2020-10-14 DIAGNOSIS — Z72.0 TOBACCO USER: ICD-10-CM

## 2020-10-14 DIAGNOSIS — J30.9 ALLERGIC RHINITIS, UNSPECIFIED SEASONALITY, UNSPECIFIED TRIGGER: ICD-10-CM

## 2020-10-14 DIAGNOSIS — D72.820 LYMPHOCYTOSIS: ICD-10-CM

## 2020-10-14 DIAGNOSIS — Z12.11 SPECIAL SCREENING FOR MALIGNANT NEOPLASMS, COLON: Primary | ICD-10-CM

## 2020-10-14 DIAGNOSIS — E43 SEVERE PROTEIN-CALORIE MALNUTRITION (HCC): ICD-10-CM

## 2020-10-14 DIAGNOSIS — E78.5 HYPERLIPIDEMIA, UNSPECIFIED HYPERLIPIDEMIA TYPE: ICD-10-CM

## 2020-10-14 PROCEDURE — 99214 OFFICE O/P EST MOD 30 MIN: CPT | Performed by: FAMILY MEDICINE

## 2020-10-14 PROCEDURE — 90694 VACC AIIV4 NO PRSRV 0.5ML IM: CPT | Performed by: FAMILY MEDICINE

## 2020-10-14 PROCEDURE — G0008 ADMIN INFLUENZA VIRUS VAC: HCPCS | Performed by: FAMILY MEDICINE

## 2020-10-14 RX ORDER — AMITRIPTYLINE HYDROCHLORIDE 25 MG/1
50 TABLET, FILM COATED ORAL NIGHTLY
Qty: 180 TABLET | Refills: 1 | Status: SHIPPED | OUTPATIENT
Start: 2020-10-14 | End: 2021-04-14 | Stop reason: SDUPTHER

## 2020-10-14 RX ORDER — DENOSUMAB 60 MG/ML
60 INJECTION SUBCUTANEOUS
Qty: 1.8 ML | Refills: 0 | Status: SHIPPED | OUTPATIENT
Start: 2020-10-14 | End: 2021-04-14

## 2020-10-14 RX ORDER — FLUTICASONE PROPIONATE 50 MCG
2 SPRAY, SUSPENSION (ML) NASAL DAILY
Qty: 1 BOTTLE | Refills: 3 | Status: SHIPPED | OUTPATIENT
Start: 2020-10-14 | End: 2021-04-14 | Stop reason: SDUPTHER

## 2020-10-14 RX ORDER — CALCIUM CITRATE/VITAMIN D3 200MG-6.25
1 TABLET ORAL DAILY
Qty: 30 EACH | Refills: 3 | Status: SHIPPED | OUTPATIENT
Start: 2020-10-14 | End: 2023-02-16

## 2020-10-14 RX ORDER — SIMVASTATIN 20 MG
20 TABLET ORAL NIGHTLY
Qty: 90 TABLET | Refills: 3 | Status: SHIPPED | OUTPATIENT
Start: 2020-10-14 | End: 2021-04-14 | Stop reason: SDUPTHER

## 2020-10-14 RX ORDER — AMLODIPINE BESYLATE 10 MG/1
10 TABLET ORAL DAILY
Qty: 90 TABLET | Refills: 3 | Status: SHIPPED | OUTPATIENT
Start: 2020-10-14 | End: 2021-04-14 | Stop reason: SDUPTHER

## 2020-10-14 RX ORDER — PROPRANOLOL HYDROCHLORIDE 40 MG/1
40 TABLET ORAL DAILY
Qty: 90 TABLET | Refills: 3 | Status: SHIPPED | OUTPATIENT
Start: 2020-10-14 | End: 2021-04-14 | Stop reason: SDUPTHER

## 2020-10-14 RX ORDER — ERGOCALCIFEROL 1.25 MG/1
50000 CAPSULE ORAL
Qty: 16 CAPSULE | Refills: 3 | Status: SHIPPED | OUTPATIENT
Start: 2020-10-14 | End: 2021-04-14

## 2020-10-14 RX ORDER — MONTELUKAST SODIUM 10 MG/1
10 TABLET ORAL
Qty: 90 TABLET | Refills: 3 | Status: SHIPPED | OUTPATIENT
Start: 2020-10-14 | End: 2021-04-14 | Stop reason: SDUPTHER

## 2020-10-14 RX ORDER — ALBUTEROL SULFATE 90 UG/1
2 AEROSOL, METERED RESPIRATORY (INHALATION) EVERY 4 HOURS PRN
Qty: 18 G | Refills: 3 | Status: SHIPPED | OUTPATIENT
Start: 2020-10-14 | End: 2021-08-31 | Stop reason: SDUPTHER

## 2020-10-14 NOTE — PATIENT INSTRUCTIONS
Leukocytosis  Leukocytosis means that a person has more white blood cells than normal. White blood cells are made in the bone marrow. Bone marrow is the spongy tissue inside bones. The main job of white blood cells is to fight infection.  Having too many white blood cells is a common condition. It can develop as a result of many types of medical problems.  What are the causes?  Leukocytosis may be caused by various conditions. In some cases, the bone marrow is normal but is still making too many white blood cells. This can be due to:  · Infection.  · Injury.  · Physical stress.  · Emotional stress.  · Surgery.  · Allergic reactions.  · Tumors that do not start in the blood or bone marrow.  · An inherited disease.  · Certain medicines.  · Pregnancy and labor.  In other cases, a person may have a bone marrow disorder that is causing the body to make too many white blood cells. Bone marrow disorders include:  · Leukemia. This is a type of blood cancer.  · Myeloproliferative disorders. These disorders cause blood cells to grow abnormally.  What are the signs or symptoms?  Often, this condition causes no symptoms. Some people may have symptoms due to the medical condition that is causing their leukocytosis. These symptoms may include:  · Bleeding.  · Bruising.  · Fever.  · Night sweats.  · Swollen lymph nodes.  · An enlarged spleen.  · Repeated infections.  · Weakness.  · Weight loss.  How is this diagnosed?  This condition is diagnosed with blood tests. It is often found when blood is tested as part of a routine physical exam. You may have other tests to help determine why you have too many white blood cells. These tests may include:  · A complete blood count (CBC). This test measures all the types of blood cells in your body.  · Chest X-rays, urine tests, or other tests to look for signs of infection.  · Bone marrow aspiration. For this test, a needle is put into your bone. Cells from the bone marrow are removed  through the needle and examined under a microscope.  · Other tests on the blood or bone marrow sample.  · CT scan, bone scan, or other imaging tests.  How is this treated?  Usually, treatment is not needed for leukocytosis. However, if an infection, cancer, bone marrow disorder, or other serious problem is causing your leukocytosis, it will need to be treated. Treatment may include:  · Regular monitoring of your white blood cell count to look for changes.  · Antibiotic medicine if you have a bacterial infection.  · Bone marrow transplant. This treatment replaces your diseased bone marrow with healthy cells that will grow new bone marrow.  · Chemotherapy or biological therapies such as the use of antibodies. These treatments may be used to kill cancer cells or to decrease the number of white blood cells.  Follow these instructions at home:  Medicines  · Take over-the-counter and prescription medicines only as told by your health care provider.  · If you were prescribed an antibiotic medicine, take it as told by your health care provider. Do not stop taking the antibiotic even if you start to feel better.  Eating and drinking    · Eat foods that are low in saturated fats and high in fiber. Eat plenty of fruits and vegetables.  · Drink enough fluid to keep your urine pale yellow.  · Limit your intake of caffeine and alcohol.  General instructions  · Maintain a healthy weight. Ask your health care provider what weight is best for you.  · Do 30 minutes of exercise at least 5 times each week. Check with your health care provider before you start a new exercise routine.  · Follow any safety precautions as told by your health care provider. This may be needed if you are at increased risk for infection or bleeding because of your condition.  · Do not use any products that contain nicotine or tobacco, such as cigarettes, e-cigarettes, and chewing tobacco. If you need help quitting, ask your health care provider.  · Keep all  follow-up visits as told by your health care provider. This is important.  Contact a health care provider if you:  · Feel weak or more tired than usual.  · Develop chills, a cough, or nasal congestion.  · Have a fever.  · Lose weight without trying.  · Have night sweats.  · Bruise easily.  · Have new or worsening symptoms.  Get help right away if you:  · Bleed more than normal.  · Have chest pain.  · Have trouble breathing.  · Have uncontrolled nausea or vomiting.  · Feel dizzy or light-headed.  Summary  · Leukocytosis means that a person has more white blood cells than normal.  · This condition often causes no symptoms.  · This condition may be caused by various conditions.  · If an infection, cancer, bone marrow disorder, or other serious problem is causing your leukocytosis, it will need to be treated.  · Keep all follow-up visits as told by your health care provider. This is important.  This information is not intended to replace advice given to you by your health care provider. Make sure you discuss any questions you have with your health care provider.  Document Released: 12/06/2012 Document Revised: 09/12/2019 Document Reviewed: 09/12/2019  "[employer]" Patient Education © 2020 "[employer] Inc[employer]    Chronic Kidney Disease, Adult  Chronic kidney disease (CKD) occurs when the kidneys become damaged slowly over a long period of time. The kidneys are a pair of organs that do many important jobs in the body, including:  · Removing waste and extra fluid from the blood to make urine.  · Making hormones that maintain the amount of fluid in tissues and blood vessels.  · Maintaining the right amount of fluids and chemicals in the body.  A small amount of kidney damage may not cause problems, but a large amount of damage may make it hard or impossible for the kidneys to work the way they should. If steps are not taken to slow down kidney damage or to stop it from getting worse, the kidneys may stop working permanently (end-stage  renal disease or ESRD). Most of the time, CKD does not go away, but it can often be controlled. People who have CKD are usually able to live normal lives.  What are the causes?  The most common causes of this condition are diabetes and high blood pressure (hypertension). Other causes include:  · Heart and blood vessel (cardiovascular) disease.  · Kidney diseases, such as:  ? Glomerulonephritis.  ? Interstitial nephritis.  ? Polycystic kidney disease.  ? Renal vascular disease.  · Diseases that affect the immune system.  · Genetic diseases.  · Medicines that damage the kidneys, such as anti-inflammatory medicines.  · Being around or being in contact with poisonous (toxic) substances.  · A kidney or urinary infection that occurs again and again (recurs).  · Vasculitis. This is swelling or inflammation of the blood vessels.  · A problem with urine flow that may be caused by:  ? Cancer.  ? Having kidney stones more than one time.  ? An enlarged prostate, in males.  What increases the risk?  You are more likely to develop this condition if you:  · Are older than age 60.  · Are female.  · Are -American, , , , or .  · Are a current or former smoker.  · Are obese.  · Have a family history of kidney disease or failure.  · Often take medicines that are damaging to the kidneys.  What are the signs or symptoms?  Symptoms of this condition include:  · Swelling (edema) of the face, legs, ankles, or feet.  · Tiredness (lethargy) and having less energy.  · Nausea or vomiting.  · Confusion or trouble concentrating.  · Problems with urination, such as:  ? Painful or burning feeling during urination.  ? Decreased urine production.  ? Frequent urination, especially at night.  ? Bloody urine.  · Muscle twitches and cramps, especially in the legs.  · Shortness of breath.  · Weakness.  · Loss of appetite.  · Metallic taste in the mouth.  · Trouble sleeping.  · Dry, itchy skin.  · A low  blood count (anemia).  · Pale lining of the eyelids and surface of the eye (conjunctiva).  Symptoms develop slowly and may not be obvious until the kidney damage becomes severe. It is possible to have kidney disease for years without having any symptoms.  How is this diagnosed?  This condition may be diagnosed based on:  · Blood tests.  · Urine tests.  · Imaging tests, such as an ultrasound or CT scan.  · A test in which a sample of tissue is removed from the kidneys to be examined under a microscope (kidney biopsy).  These test results will help your health care provider determine how serious the CKD is.  How is this treated?  There is no cure for most cases of this condition, but treatment usually relieves symptoms and prevents or slows the progression of the disease. Treatment may include:  · Making diet changes, which may require you to avoid alcohol, salty foods (sodium), and foods that are high in potassium, calcium, and protein.  · Medicines:  ? To lower blood pressure.  ? To control blood glucose.  ? To relieve anemia.  ? To relieve swelling.  ? To protect your bones.  ? To improve the balance of electrolytes in your blood.  · Removing toxic waste from the body through types of dialysis, if the kidneys can no longer do their job (kidney failure).  · Managing any other conditions that are causing your CKD or making it worse.  Follow these instructions at home:  Medicines  · Take over-the-counter and prescription medicines only as told by your health care provider. The dose of some medicines that you take may need to be adjusted.  · Do not take any new medicines unless approved by your health care provider. Many medicines can worsen your kidney damage.  · Do not take any vitamin and mineral supplements unless approved by your health care provider. Many nutritional supplements can worsen your kidney damage.  General instructions  · Follow your prescribed diet as told by your health care provider.  · Do not use  any products that contain nicotine or tobacco, such as cigarettes and e-cigarettes. If you need help quitting, ask your health care provider.  · Monitor and track your blood pressure at home. Report changes in your blood pressure as told by your health care provider.  · If you are being treated for diabetes, monitor and track your blood sugar (blood glucose) levels as told by your health care provider.  · Maintain a healthy weight. If you need help with this, ask your health care provider.  · Start or continue an exercise plan. Exercise at least 30 minutes a day, 5 days a week.  · Keep your immunizations up to date as told by your health care provider.  · Keep all follow-up visits as told by your health care provider. This is important.  Where to find more information  · American Association of Kidney Patients: www.aakp.org  · National Kidney Foundation: www.kidney.org  · American Kidney Fund: www.akfinc.org  · [facility]: www.lifeoptions.org and www.kidneyschool.org  Contact a health care provider if:  · Your symptoms get worse.  · You develop new symptoms.  Get help right away if:  · You develop symptoms of ESRD, which include:  ? Headaches.  ? Numbness in the hands or feet.  ? Easy bruising.  ? Frequent hiccups.  ? Chest pain.  ? Shortness of breath.  ? Lack of menstruation, in women.  · You have a fever.  · You have decreased urine production.  · You have pain or bleeding when you urinate.  Summary  · Chronic kidney disease (CKD) occurs when the kidneys become damaged slowly over a long period of time.  · The most common causes of this condition are diabetes and high blood pressure (hypertension).  · There is no cure for most cases of this condition, but treatment usually relieves symptoms and prevents or slows the progression of the disease. Treatment may include a combination of medicines and lifestyle changes.  This information is not intended to replace advice given to you by your  health care provider. Make sure you discuss any questions you have with your health care provider.  Document Released: 09/26/2009 Document Revised: 11/30/2018 Document Reviewed: 01/25/2018  [employer] Patient Education © 2020 [employer]    Leukocytosis  Leukocytosis means that a person has more white blood cells than normal. White blood cells are made in the bone marrow. Bone marrow is the spongy tissue inside bones. The main job of white blood cells is to fight infection.  Having too many white blood cells is a common condition. It can develop as a result of many types of medical problems.  What are the causes?  Leukocytosis may be caused by various conditions. In some cases, the bone marrow is normal but is still making too many white blood cells. This can be due to:  · Infection.  · Injury.  · Physical stress.  · Emotional stress.  · Surgery.  · Allergic reactions.  · Tumors that do not start in the blood or bone marrow.  · An inherited disease.  · Certain medicines.  · Pregnancy and labor.  In other cases, a person may have a bone marrow disorder that is causing the body to make too many white blood cells. Bone marrow disorders include:  · Leukemia. This is a type of blood cancer.  · Myeloproliferative disorders. These disorders cause blood cells to grow abnormally.  What are the signs or symptoms?  Often, this condition causes no symptoms. Some people may have symptoms due to the medical condition that is causing their leukocytosis. These symptoms may include:  · Bleeding.  · Bruising.  · Fever.  · Night sweats.  · Swollen lymph nodes.  · An enlarged spleen.  · Repeated infections.  · Weakness.  · Weight loss.  How is this diagnosed?  This condition is diagnosed with blood tests. It is often found when blood is tested as part of a routine physical exam. You may have other tests to help determine why you have too many white blood cells. These tests may include:  · A complete blood count (CBC). This test  measures all the types of blood cells in your body.  · Chest X-rays, urine tests, or other tests to look for signs of infection.  · Bone marrow aspiration. For this test, a needle is put into your bone. Cells from the bone marrow are removed through the needle and examined under a microscope.  · Other tests on the blood or bone marrow sample.  · CT scan, bone scan, or other imaging tests.  How is this treated?  Usually, treatment is not needed for leukocytosis. However, if an infection, cancer, bone marrow disorder, or other serious problem is causing your leukocytosis, it will need to be treated. Treatment may include:  · Regular monitoring of your white blood cell count to look for changes.  · Antibiotic medicine if you have a bacterial infection.  · Bone marrow transplant. This treatment replaces your diseased bone marrow with healthy cells that will grow new bone marrow.  · Chemotherapy or biological therapies such as the use of antibodies. These treatments may be used to kill cancer cells or to decrease the number of white blood cells.  Follow these instructions at home:  Medicines  · Take over-the-counter and prescription medicines only as told by your health care provider.  · If you were prescribed an antibiotic medicine, take it as told by your health care provider. Do not stop taking the antibiotic even if you start to feel better.  Eating and drinking    · Eat foods that are low in saturated fats and high in fiber. Eat plenty of fruits and vegetables.  · Drink enough fluid to keep your urine pale yellow.  · Limit your intake of caffeine and alcohol.  General instructions  · Maintain a healthy weight. Ask your health care provider what weight is best for you.  · Do 30 minutes of exercise at least 5 times each week. Check with your health care provider before you start a new exercise routine.  · Follow any safety precautions as told by your health care provider. This may be needed if you are at increased  risk for infection or bleeding because of your condition.  · Do not use any products that contain nicotine or tobacco, such as cigarettes, e-cigarettes, and chewing tobacco. If you need help quitting, ask your health care provider.  · Keep all follow-up visits as told by your health care provider. This is important.  Contact a health care provider if you:  · Feel weak or more tired than usual.  · Develop chills, a cough, or nasal congestion.  · Have a fever.  · Lose weight without trying.  · Have night sweats.  · Bruise easily.  · Have new or worsening symptoms.  Get help right away if you:  · Bleed more than normal.  · Have chest pain.  · Have trouble breathing.  · Have uncontrolled nausea or vomiting.  · Feel dizzy or light-headed.  Summary  · Leukocytosis means that a person has more white blood cells than normal.  · This condition often causes no symptoms.  · This condition may be caused by various conditions.  · If an infection, cancer, bone marrow disorder, or other serious problem is causing your leukocytosis, it will need to be treated.  · Keep all follow-up visits as told by your health care provider. This is important.  This information is not intended to replace advice given to you by your health care provider. Make sure you discuss any questions you have with your health care provider.  Document Released: 12/06/2012 Document Revised: 09/12/2019 Document Reviewed: 09/12/2019  [employer] Patient Education © 2020 [employer]  Pneumococcal Conjugate Vaccine suspension for injection  What is this medicine?  PNEUMOCOCCAL VACCINE (FLORI mo SABINA al vak SEEN) is a vaccine used to prevent pneumococcus bacterial infections. These bacteria can cause serious infections like pneumonia, meningitis, and blood infections. This vaccine will lower your chance of getting pneumonia. If you do get pneumonia, it can make your symptoms milder and your illness shorter. This vaccine will not treat an infection and will not cause  infection. This vaccine is recommended for infants and young children, adults with certain medical conditions, and adults 65 years or older.  This medicine may be used for other purposes; ask your health care provider or pharmacist if you have questions.  COMMON BRAND NAME(S): Prevnar, Prevnar 13  What should I tell my health care provider before I take this medicine?  They need to know if you have any of these conditions:  · bleeding problems  · fever  · immune system problems  · an unusual or allergic reaction to pneumococcal vaccine, diphtheria toxoid, other vaccines, latex, other medicines, foods, dyes, or preservatives  · pregnant or trying to get pregnant  · breast-feeding  How should I use this medicine?  This vaccine is for injection into a muscle. It is given by a health care professional.  A copy of Vaccine Information Statements will be given before each vaccination. Read this sheet carefully each time. The sheet may change frequently.  Talk to your pediatrician regarding the use of this medicine in children. While this drug may be prescribed for children as young as 6 weeks old for selected conditions, precautions do apply.  Overdosage: If you think you have taken too much of this medicine contact a poison control center or emergency room at once.  NOTE: This medicine is only for you. Do not share this medicine with others.  What if I miss a dose?  It is important not to miss your dose. Call your doctor or health care professional if you are unable to keep an appointment.  What may interact with this medicine?  · medicines for cancer chemotherapy  · medicines that suppress your immune function  · steroid medicines like prednisone or cortisone  This list may not describe all possible interactions. Give your health care provider a list of all the medicines, herbs, non-prescription drugs, or dietary supplements you use. Also tell them if you smoke, drink alcohol, or use illegal drugs. Some items may  interact with your medicine.  What should I watch for while using this medicine?  Mild fever and pain should go away in 3 days or less. Report any unusual symptoms to your doctor or health care professional.  What side effects may I notice from receiving this medicine?  Side effects that you should report to your doctor or health care professional as soon as possible:  · allergic reactions like skin rash, itching or hives, swelling of the face, lips, or tongue  · breathing problems  · confused  · fast or irregular heartbeat  · fever over 102 degrees F  · seizures  · unusual bleeding or bruising  · unusual muscle weakness  Side effects that usually do not require medical attention (report to your doctor or health care professional if they continue or are bothersome):  · aches and pains  · diarrhea  · fever of 102 degrees F or less  · headache  · irritable  · loss of appetite  · pain, tender at site where injected  · trouble sleeping  This list may not describe all possible side effects. Call your doctor for medical advice about side effects. You may report side effects to FDA at 5-291-OZY-4777.  Where should I keep my medicine?  This does not apply. This vaccine is given in a clinic, pharmacy, doctor's office, or other health care setting and will not be stored at home.  NOTE: This sheet is a summary. It may not cover all possible information. If you have questions about this medicine, talk to your doctor, pharmacist, or health care provider.  © 2020 [employer] (2015-09-24 10:27:27)    High-Protein and High-Calorie Diet  Eating high-protein and high-calorie foods can help you to gain weight, heal after an injury, and recover after an illness or surgery. The specific amount of daily protein and calories you need depends on:  · Your body weight.  · The reason this diet is recommended for you.  What is my plan?  Generally, a high-protein, high-calorie diet involves:  · Eating 250-500 extra calories each  day.  · Making sure that you get enough of your daily calories from protein. Ask your health care provider how many of your calories should come from protein.  Talk with a health care provider, such as a diet and nutrition specialist (dietitian), about how much protein and how many calories you need each day. Follow the diet as directed by your health care provider.  What are tips for following this plan?    Preparing meals  · Add whole milk, half-and-half, or heavy cream to cereal, pudding, soup, or hot cocoa.  · Add whole milk to instant breakfast drinks.  · Add peanut butter to oatmeal or smoothies.  · Add powdered milk to baked goods, smoothies, or milkshakes.  · Add powdered milk, cream, or butter to mashed potatoes.  · Add cheese to cooked vegetables.  · Make whole-milk yogurt parfaits. Top them with granola, fruit, or nuts.  · Add cottage cheese to your fruit.  · Add avocado, cheese, or both to sandwiches or salads.  · Add meat, poultry, or seafood to rice, pasta, casseroles, salads, and soups.  · Use mayonnaise when making egg salad, chicken salad, or tuna salad.  · Use peanut butter as a dip for vegetables or as a topping for pretzels, celery, or crackers.  · Add beans to casseroles, dips, and spreads.  · Add pureed beans to sauces and soups.  · Replace calorie-free drinks with calorie-containing drinks, such as milk and fruit juice.  · Replace water with milk or heavy cream when making foods such as oatmeal, pudding, or cocoa.  General instructions  · Ask your health care provider if you should take a nutritional supplement.  · Try to eat six small meals each day instead of three large meals.  · Eat a balanced diet. In each meal, include one food that is high in protein.  · Keep nutritious snacks available, such as nuts, trail mixes, dried fruit, and yogurt.  · If you have kidney disease or diabetes, talk with your health care provider about how much protein is safe for you. Too much protein may put extra  stress on your kidneys.  · Drink your calories. Choose high-calorie drinks and have them after your meals.  What high-protein foods should I eat?    Vegetables  Soybeans. Peas.  Grains  Quinoa. Bulgur wheat.  Meats and other proteins  Beef, pork, and poultry. Fish and seafood. Eggs. Tofu. Textured vegetable protein (TVP). Peanut butter. Nuts and seeds. Dried beans. Protein powders.  Dairy  Whole milk. Whole-milk yogurt. Powdered milk. Cheese. Cottage Cheese. Eggnog.  Beverages  High-protein supplement drinks. Soy milk.  Other foods  Protein bars.  The items listed above may not be a complete list of high-protein foods and beverages. Contact a dietitian for more options.  What high-calorie foods should I eat?  Fruits  Dried fruit. Fruit leather. Canned fruit in syrup. Fruit juice. Avocado.  Vegetables  Vegetables cooked in oil or butter. Fried potatoes.  Grains  Pasta. Quick breads. Muffins. Pancakes. Ready-to-eat cereal.  Meats and other proteins  Peanut butter. Nuts and seeds.  Dairy  Heavy cream. Whipped cream. Cream cheese. Sour cream. Ice cream. Custard. Pudding.  Beverages  Meal-replacement beverages. Nutrition shakes. Fruit juice. Sugar-sweetened soft drinks.  Seasonings and condiments  Salad dressing. Mayonnaise. Fausto sauce. Fruit preserves or jelly. Honey. Syrup.  Sweets and desserts  Cake. Cookies. Pie. Pastries. Candy bars. Chocolate.  Fats and oils  Butter or margarine. Oil. Gravy.  Other foods  Meal-replacement bars.  The items listed above may not be a complete list of high-calorie foods and beverages. Contact a dietitian for more options.  Summary  · A high-protein, high-calorie diet can help you gain weight or heal faster after an injury, illness, or surgery.  · To increase your protein and calories, add ingredients such as whole milk, peanut butter, cheese, beans, meat, or seafood to meal items.  · To get enough extra calories each day, include high-calorie foods and beverages at each  meal.  · Adding a high-calorie drink or shake can be an easy way to help you get enough calories each day. Talk with your healthcare provider or dietitian about the best options for you.  This information is not intended to replace advice given to you by your health care provider. Make sure you discuss any questions you have with your health care provider.  Document Released: 12/18/2006 Document Revised: 11/30/2018 Document Reviewed: 10/30/2018  Elsevier Patient Education © 2020 [employer]

## 2020-11-18 ENCOUNTER — TELEPHONE (OUTPATIENT)
Dept: FAMILY MEDICINE CLINIC | Facility: CLINIC | Age: 65
End: 2020-11-18

## 2020-11-23 DIAGNOSIS — Z12.11 SPECIAL SCREENING FOR MALIGNANT NEOPLASMS, COLON: ICD-10-CM

## 2021-01-06 RX ORDER — IBUPROFEN 800 MG/1
TABLET ORAL
Qty: 120 TABLET | Refills: 2 | Status: SHIPPED | OUTPATIENT
Start: 2021-01-06 | End: 2021-04-14

## 2021-04-13 ENCOUNTER — LAB (OUTPATIENT)
Dept: LAB | Facility: HOSPITAL | Age: 66
End: 2021-04-13

## 2021-04-13 DIAGNOSIS — D72.820 LYMPHOCYTOSIS: ICD-10-CM

## 2021-04-13 DIAGNOSIS — I10 ESSENTIAL HYPERTENSION: ICD-10-CM

## 2021-04-13 DIAGNOSIS — G43.809 OTHER MIGRAINE WITHOUT STATUS MIGRAINOSUS, NOT INTRACTABLE: ICD-10-CM

## 2021-04-13 DIAGNOSIS — G89.29 CHRONIC THORACIC BACK PAIN, UNSPECIFIED BACK PAIN LATERALITY: ICD-10-CM

## 2021-04-13 DIAGNOSIS — E78.5 HYPERLIPIDEMIA, UNSPECIFIED HYPERLIPIDEMIA TYPE: ICD-10-CM

## 2021-04-13 DIAGNOSIS — Z72.0 TOBACCO USER: ICD-10-CM

## 2021-04-13 DIAGNOSIS — E43 SEVERE PROTEIN-CALORIE MALNUTRITION (HCC): ICD-10-CM

## 2021-04-13 DIAGNOSIS — J30.9 ALLERGIC RHINITIS, UNSPECIFIED SEASONALITY, UNSPECIFIED TRIGGER: ICD-10-CM

## 2021-04-13 DIAGNOSIS — Z12.11 SPECIAL SCREENING FOR MALIGNANT NEOPLASMS, COLON: ICD-10-CM

## 2021-04-13 DIAGNOSIS — M54.6 CHRONIC THORACIC BACK PAIN, UNSPECIFIED BACK PAIN LATERALITY: ICD-10-CM

## 2021-04-13 DIAGNOSIS — N18.2 STAGE 2 CHRONIC KIDNEY DISEASE: ICD-10-CM

## 2021-04-13 LAB
25(OH)D3 SERPL-MCNC: 26.1 NG/ML
ALBUMIN SERPL-MCNC: 4 G/DL (ref 3.5–5.2)
ALBUMIN/GLOB SERPL: 1.5 G/DL
ALP SERPL-CCNC: 121 U/L (ref 39–117)
ALT SERPL W P-5'-P-CCNC: 12 U/L (ref 1–33)
ANION GAP SERPL CALCULATED.3IONS-SCNC: 11.2 MMOL/L (ref 5–15)
AST SERPL-CCNC: 15 U/L (ref 1–32)
BASOPHILS # BLD AUTO: 0.07 10*3/MM3 (ref 0–0.2)
BASOPHILS NFR BLD AUTO: 0.7 % (ref 0–1.5)
BILIRUB SERPL-MCNC: 0.2 MG/DL (ref 0–1.2)
BUN SERPL-MCNC: 11 MG/DL (ref 8–23)
BUN/CREAT SERPL: 16.9 (ref 7–25)
CALCIUM SPEC-SCNC: 9.5 MG/DL (ref 8.6–10.5)
CHLORIDE SERPL-SCNC: 100 MMOL/L (ref 98–107)
CHOLEST SERPL-MCNC: 160 MG/DL (ref 0–200)
CO2 SERPL-SCNC: 25.8 MMOL/L (ref 22–29)
CREAT SERPL-MCNC: 0.65 MG/DL (ref 0.57–1)
DEPRECATED RDW RBC AUTO: 42.4 FL (ref 37–54)
EOSINOPHIL # BLD AUTO: 0.22 10*3/MM3 (ref 0–0.4)
EOSINOPHIL NFR BLD AUTO: 2.1 % (ref 0.3–6.2)
ERYTHROCYTE [DISTWIDTH] IN BLOOD BY AUTOMATED COUNT: 12.4 % (ref 12.3–15.4)
GFR SERPL CREATININE-BSD FRML MDRD: 91 ML/MIN/1.73
GLOBULIN UR ELPH-MCNC: 2.6 GM/DL
GLUCOSE SERPL-MCNC: 82 MG/DL (ref 65–99)
HCT VFR BLD AUTO: 41.1 % (ref 34–46.6)
HDLC SERPL-MCNC: 47 MG/DL (ref 40–60)
HGB BLD-MCNC: 14.3 G/DL (ref 12–15.9)
IMM GRANULOCYTES # BLD AUTO: 0.05 10*3/MM3 (ref 0–0.05)
IMM GRANULOCYTES NFR BLD AUTO: 0.5 % (ref 0–0.5)
LDLC SERPL CALC-MCNC: 89 MG/DL (ref 0–100)
LDLC/HDLC SERPL: 1.81 {RATIO}
LYMPHOCYTES # BLD AUTO: 2.8 10*3/MM3 (ref 0.7–3.1)
LYMPHOCYTES NFR BLD AUTO: 27.2 % (ref 19.6–45.3)
MCH RBC QN AUTO: 32.8 PG (ref 26.6–33)
MCHC RBC AUTO-ENTMCNC: 34.8 G/DL (ref 31.5–35.7)
MCV RBC AUTO: 94.3 FL (ref 79–97)
MONOCYTES # BLD AUTO: 0.71 10*3/MM3 (ref 0.1–0.9)
MONOCYTES NFR BLD AUTO: 6.9 % (ref 5–12)
NEUTROPHILS NFR BLD AUTO: 6.45 10*3/MM3 (ref 1.7–7)
NEUTROPHILS NFR BLD AUTO: 62.6 % (ref 42.7–76)
NRBC BLD AUTO-RTO: 0 /100 WBC (ref 0–0.2)
PLATELET # BLD AUTO: 321 10*3/MM3 (ref 140–450)
PMV BLD AUTO: 10.4 FL (ref 6–12)
POTASSIUM SERPL-SCNC: 3.8 MMOL/L (ref 3.5–5.2)
PROT SERPL-MCNC: 6.6 G/DL (ref 6–8.5)
RBC # BLD AUTO: 4.36 10*6/MM3 (ref 3.77–5.28)
SODIUM SERPL-SCNC: 137 MMOL/L (ref 136–145)
TRIGL SERPL-MCNC: 139 MG/DL (ref 0–150)
VLDLC SERPL-MCNC: 24 MG/DL (ref 5–40)
WBC # BLD AUTO: 10.3 10*3/MM3 (ref 3.4–10.8)

## 2021-04-13 PROCEDURE — 82306 VITAMIN D 25 HYDROXY: CPT

## 2021-04-13 PROCEDURE — 80053 COMPREHEN METABOLIC PANEL: CPT

## 2021-04-13 PROCEDURE — 80061 LIPID PANEL: CPT

## 2021-04-13 PROCEDURE — 85025 COMPLETE CBC W/AUTO DIFF WBC: CPT

## 2021-04-14 ENCOUNTER — OFFICE VISIT (OUTPATIENT)
Dept: FAMILY MEDICINE CLINIC | Facility: CLINIC | Age: 66
End: 2021-04-14

## 2021-04-14 VITALS
DIASTOLIC BLOOD PRESSURE: 62 MMHG | BODY MASS INDEX: 16.52 KG/M2 | OXYGEN SATURATION: 92 % | SYSTOLIC BLOOD PRESSURE: 104 MMHG | HEART RATE: 84 BPM | WEIGHT: 89.8 LBS | HEIGHT: 62 IN | TEMPERATURE: 96.6 F

## 2021-04-14 DIAGNOSIS — G89.29 CHRONIC THORACIC BACK PAIN, UNSPECIFIED BACK PAIN LATERALITY: Primary | ICD-10-CM

## 2021-04-14 DIAGNOSIS — M81.0 OSTEOPOROSIS, UNSPECIFIED OSTEOPOROSIS TYPE, UNSPECIFIED PATHOLOGICAL FRACTURE PRESENCE: ICD-10-CM

## 2021-04-14 DIAGNOSIS — E78.2 MIXED HYPERLIPIDEMIA: ICD-10-CM

## 2021-04-14 DIAGNOSIS — E55.9 VITAMIN D DEFICIENCY: ICD-10-CM

## 2021-04-14 DIAGNOSIS — R63.6 UNDERWEIGHT: ICD-10-CM

## 2021-04-14 DIAGNOSIS — Z72.0 TOBACCO USER: ICD-10-CM

## 2021-04-14 DIAGNOSIS — M54.6 CHRONIC THORACIC BACK PAIN, UNSPECIFIED BACK PAIN LATERALITY: Primary | ICD-10-CM

## 2021-04-14 DIAGNOSIS — R19.5 POSITIVE COLORECTAL CANCER SCREENING USING COLOGUARD TEST: ICD-10-CM

## 2021-04-14 DIAGNOSIS — E44.0 MODERATE PROTEIN-CALORIE MALNUTRITION (HCC): ICD-10-CM

## 2021-04-14 DIAGNOSIS — I10 ESSENTIAL HYPERTENSION: ICD-10-CM

## 2021-04-14 LAB
AMPHET+METHAMPHET UR QL: NEGATIVE
AMPHETAMINES UR QL: NEGATIVE
BARBITURATES UR QL SCN: NEGATIVE
BENZODIAZ UR QL SCN: NEGATIVE
BUPRENORPHINE SERPL-MCNC: NEGATIVE NG/ML
CANNABINOIDS SERPL QL: NEGATIVE
COCAINE UR QL: NEGATIVE
METHADONE UR QL SCN: NEGATIVE
OPIATES UR QL: NEGATIVE
OXYCODONE UR QL SCN: NEGATIVE
PCP UR QL SCN: NEGATIVE
PROPOXYPH UR QL: NEGATIVE
TRICYCLICS UR QL SCN: POSITIVE

## 2021-04-14 PROCEDURE — 99214 OFFICE O/P EST MOD 30 MIN: CPT | Performed by: FAMILY MEDICINE

## 2021-04-14 PROCEDURE — 80306 DRUG TEST PRSMV INSTRMNT: CPT

## 2021-04-14 RX ORDER — MONTELUKAST SODIUM 10 MG/1
10 TABLET ORAL
Qty: 90 TABLET | Refills: 3 | Status: SHIPPED | OUTPATIENT
Start: 2021-04-14 | End: 2022-06-06

## 2021-04-14 RX ORDER — SIMVASTATIN 20 MG
20 TABLET ORAL NIGHTLY
Qty: 90 TABLET | Refills: 3 | Status: SHIPPED | OUTPATIENT
Start: 2021-04-14 | End: 2022-06-09 | Stop reason: SDUPTHER

## 2021-04-14 RX ORDER — AMITRIPTYLINE HYDROCHLORIDE 25 MG/1
50 TABLET, FILM COATED ORAL NIGHTLY
Qty: 180 TABLET | Refills: 1 | Status: SHIPPED | OUTPATIENT
Start: 2021-04-14 | End: 2021-08-31 | Stop reason: SDUPTHER

## 2021-04-14 RX ORDER — ACETAMINOPHEN AND CODEINE PHOSPHATE 300; 30 MG/1; MG/1
1 TABLET ORAL EVERY 4 HOURS PRN
Qty: 30 TABLET | Refills: 0 | Status: SHIPPED | OUTPATIENT
Start: 2021-04-14 | End: 2023-02-16

## 2021-04-14 RX ORDER — CALCIUM CITRATE/VITAMIN D3 200MG-6.25
1 TABLET ORAL DAILY
Qty: 30 EACH | Refills: 3 | Status: SHIPPED | OUTPATIENT
Start: 2021-04-14 | End: 2023-02-16

## 2021-04-14 RX ORDER — FLUTICASONE PROPIONATE 50 MCG
2 SPRAY, SUSPENSION (ML) NASAL DAILY
Qty: 16 G | Refills: 3 | Status: SHIPPED | OUTPATIENT
Start: 2021-04-14 | End: 2022-04-05

## 2021-04-14 RX ORDER — AMLODIPINE BESYLATE 10 MG/1
10 TABLET ORAL DAILY
Qty: 90 TABLET | Refills: 3 | Status: SHIPPED | OUTPATIENT
Start: 2021-04-14 | End: 2022-06-06

## 2021-04-14 RX ORDER — PROPRANOLOL HYDROCHLORIDE 40 MG/1
40 TABLET ORAL DAILY
Qty: 90 TABLET | Refills: 3 | Status: SHIPPED | OUTPATIENT
Start: 2021-04-14 | End: 2022-06-06

## 2021-04-14 RX ORDER — ERGOCALCIFEROL 1.25 MG/1
50000 CAPSULE ORAL WEEKLY
Qty: 5 CAPSULE | Refills: 3 | Status: SHIPPED | OUTPATIENT
Start: 2021-04-14 | End: 2021-06-30

## 2021-04-14 NOTE — PATIENT INSTRUCTIONS
Acetaminophen; Codeine tablets  What is this medicine?  ACETAMINOPHEN; CODEINE (a set a SIENNA adams fen; KOE ifeoma) is a pain reliever. It is used to treat mild to moderate pain.  This medicine may be used for other purposes; ask your health care provider or pharmacist if you have questions.  COMMON BRAND NAME(S): Cocet, Cocet Plus, Tylenol with Codeine No.3, Tylenol with Codeine No.4, Vopac  What should I tell my health care provider before I take this medicine?  They need to know if you have any of these conditions:  · brain tumor  · Crohn's disease, inflammatory bowel disease, or ulcerative colitis  · drug abuse or addiction  · head injury  · heart or circulation problems  · if you often drink alcohol  · kidney disease or problems going to the bathroom  · liver disease  · lung disease, asthma, or breathing problems  · an unusual or allergic reaction to acetaminophen, codeine, salicylates, other opioid analgesics, other medicines, foods, dyes, or preservatives  · pregnant or trying to get pregnant  · breast-feeding  How should I use this medicine?  Take this medicine by mouth with a full glass of water. Follow the directions on the prescription label. You can take it with or without food. If it upsets your stomach, take the medicine with food. Do not take your medicine more often than directed.  A special MedGuide will be given to you by the pharmacist with each prescription and refill. Be sure to read this information carefully each time.  Talk to your pediatrician regarding the use of this medicine in children. This medicine is not for use in children less than 12 years of age. Do not give this medicine to a child younger than 18 years of age after surgery to remove the tonsils and/or adenoids.  Overdosage: If you think you have taken too much of this medicine contact a poison control center or emergency room at once.  NOTE: This medicine is only for you. Do not share this medicine with others.  What if I miss a  dose?  If you miss a dose, take it as soon as you can. If it is almost time for your next dose, take only that dose. Do not take double or extra doses.  What may interact with this medicine?  This medicine may interact with the following medications:  · alcohol  · antihistamines for allergy, cough and cold  · antiviral medicines used for HIV or AIDS  · atropine  · certain antibiotics like erythromycin and clarithromycin  · certain medicines for anxiety or sleep  · certain medicines for bladder problems like oxybutynin, tolterodine  · certain medicines for depression like amitriptyline, fluoxetine, sertraline  · certain medicines for fungal infections like ketoconazole and itraconazole  · certain medicines for irregular heart beat like amiodarone, propafenone, quinidine  · certain medicines for Parkinson's disease like benztropine, trihexyphenidyl  · certain medicines for seizures like carbamazepine, phenobarbital, phenytoin, primidone  · certain medicines for stomach problems like dicyclomine, hyoscyamine  · certain medicines for travel sickness like scopolamine  · general anesthetics like halothane, isoflurane, methoxyflurane, propofol  · ipratropium  · local anesthetics like lidocaine, pramoxine, tetracaine  · MAOIs like Carbex, Eldepryl, Marplan, Nardil, and Parnate  · medicines that relax muscles for surgery  · other medicines with acetaminophen  · other narcotic medicines for pain or cough  · phenothiazines like chlorpromazine, mesoridazine, prochlorperazine, thioridazine  · rifampin  This list may not describe all possible interactions. Give your health care provider a list of all the medicines, herbs, non-prescription drugs, or dietary supplements you use. Also tell them if you smoke, drink alcohol, or use illegal drugs. Some items may interact with your medicine.  What should I watch for while using this medicine?  Tell your health care provider if your pain does not go away, if it gets worse, or if you have  new or a different type of pain. You may develop tolerance to this drug. Tolerance means that you will need a higher dose of the drug for pain relief. Tolerance is normal and is expected if you take this drug for a long time.  There are different types of narcotic drugs (opioids) for pain. If you take more than one type at the same time, you may have more side effects. Give your health care provider a list of all drugs you use. He or she will tell you how much drug to take. Do not take more drug than directed. Get emergency help right away if you have problems breathing.  Do not suddenly stop taking your drug because you may develop a severe reaction. Your body becomes used to the drug. This does NOT mean you are addicted. Addiction is a behavior related to getting and using a drug for a nonmedical reason. If you have pain, you have a medical reason to take pain drug. Your health care provider will tell you how much drug to take. If your health care provider wants you to stop the drug, the dose will be slowly lowered over time to avoid any side effects.  Talk to your health care provider about naloxone and how to get it. Naloxone is an emergency drug used for an opioid overdose. An overdose can happen if you take too much opioid. It can also happen if an opioid is taken with some other drugs or substances, like alcohol. Know the symptoms of an overdose, like trouble breathing, unusually tired or sleepy, or not being able to respond or wake up. Make sure to tell caregivers and close contacts where it is stored. Make sure they know how to use it. After naloxone is given, you must get emergency help right away. Naloxone is a temporary treatment. Repeat doses may be needed.  Children may be at higher risk for side effects. If your child has slow breathing, noisy breathing, confusion, or unusual sleepiness, stop giving this drug and get emergency help right away.  Do not take other drugs that contain acetaminophen with  this drug. Many non-prescription drugs contain acetaminophen. Always read labels carefully. If you have questions, ask your health care provider.  If you take too much acetaminophen, get medical help right away. Too much acetaminophen can be very dangerous and cause liver damage. Even if you do not have symptoms, it is important to get help right away.  You may get drowsy or dizzy. Do not drive, use machinery, or do anything that needs mental alertness until you know how this drug affects you. Do not stand up or sit up quickly, especially if you are an older patient. This reduces the risk of dizzy or fainting spells. Alcohol may interfere with the effect of this drug. Avoid alcoholic drinks.  This drug will cause constipation. If you do not have a bowel movement for 3 days, call your health care provider.  Your mouth may get dry. Chewing sugarless gum or sucking hard candy and drinking plenty of water may help. Contact your health care provider if the problem does not go away or is severe.  What side effects may I notice from receiving this medicine?  Side effects that you should report to your doctor or health care professional as soon as possible:  · allergic reactions like skin rash, itching or hives, swelling of the face, lips, or tongue  · breathing problems  · confusion  · redness, blistering, peeling or loosening of the skin, including inside the mouth  · signs and symptoms of low blood pressure like dizziness; feeling faint or lightheaded, falls; unusually weak or tired  · trouble passing urine or change in the amount of urine  · yellowing of the eyes or skin  Side effects that usually do not require medical attention (report to your doctor or health care professional if they continue or are bothersome):  · constipation  · dry mouth  · nausea, vomiting  · tiredness  This list may not describe all possible side effects. Call your doctor for medical advice about side effects. You may report side effects to FDA  at 5-090-FDA-1836.  Where should I keep my medicine?  Keep out of the reach of children. This medicine can be abused. Keep your medicine in a safe place to protect it from theft. Do not share this medicine with anyone. Selling or giving away this medicine is dangerous and against the law.  This medicine may cause accidental overdose and death if it taken by other adults, children, or pets. Mix any unused medicine with a substance like cat litter or coffee grounds. Then throw the medicine away in a sealed container like a sealed bag or a coffee can with a lid. Do not use the medicine after the expiration date.  Store at room temperature between 15 and 30 degrees C (59 and 86 degrees F).  NOTE: This sheet is a summary. It may not cover all possible information. If you have questions about this medicine, talk to your doctor, pharmacist, or health care provider.  © 2021 [employer] (2020-07-27 10:54:43)    High-Protein and High-Calorie Diet  Eating high-protein and high-calorie foods can help you to gain weight, heal after an injury, and recover after an illness or surgery. The specific amount of daily protein and calories you need depends on:  · Your body weight.  · The reason this diet is recommended for you.  What is my plan?  Generally, a high-protein, high-calorie diet involves:  · Eating 250-500 extra calories each day.  · Making sure that you get enough of your daily calories from protein. Ask your health care provider how many of your calories should come from protein.  Talk with a health care provider, such as a diet and nutrition specialist (dietitian), about how much protein and how many calories you need each day. Follow the diet as directed by your health care provider.  What are tips for following this plan?    Preparing meals  · Add whole milk, half-and-half, or heavy cream to cereal, pudding, soup, or hot cocoa.  · Add whole milk to instant breakfast drinks.  · Add peanut butter to oatmeal or  smoothies.  · Add powdered milk to baked goods, smoothies, or milkshakes.  · Add powdered milk, cream, or butter to mashed potatoes.  · Add cheese to cooked vegetables.  · Make whole-milk yogurt parfaits. Top them with granola, fruit, or nuts.  · Add cottage cheese to your fruit.  · Add avocado, cheese, or both to sandwiches or salads.  · Add meat, poultry, or seafood to rice, pasta, casseroles, salads, and soups.  · Use mayonnaise when making egg salad, chicken salad, or tuna salad.  · Use peanut butter as a dip for vegetables or as a topping for pretzels, celery, or crackers.  · Add beans to casseroles, dips, and spreads.  · Add pureed beans to sauces and soups.  · Replace calorie-free drinks with calorie-containing drinks, such as milk and fruit juice.  · Replace water with milk or heavy cream when making foods such as oatmeal, pudding, or cocoa.  General instructions  · Ask your health care provider if you should take a nutritional supplement.  · Try to eat six small meals each day instead of three large meals.  · Eat a balanced diet. In each meal, include one food that is high in protein.  · Keep nutritious snacks available, such as nuts, trail mixes, dried fruit, and yogurt.  · If you have kidney disease or diabetes, talk with your health care provider about how much protein is safe for you. Too much protein may put extra stress on your kidneys.  · Drink your calories. Choose high-calorie drinks and have them after your meals.  What high-protein foods should I eat?    Vegetables  Soybeans. Peas.  Grains  Quinoa. Bulgur wheat.  Meats and other proteins  Beef, pork, and poultry. Fish and seafood. Eggs. Tofu. Textured vegetable protein (TVP). Peanut butter. Nuts and seeds. Dried beans. Protein powders.  Dairy  Whole milk. Whole-milk yogurt. Powdered milk. Cheese. Cottage Cheese. Eggnog.  Beverages  High-protein supplement drinks. Soy milk.  Other foods  Protein bars.  The items listed above may not be a complete  list of high-protein foods and beverages. Contact a dietitian for more options.  What high-calorie foods should I eat?  Fruits  Dried fruit. Fruit leather. Canned fruit in syrup. Fruit juice. Avocado.  Vegetables  Vegetables cooked in oil or butter. Fried potatoes.  Grains  Pasta. Quick breads. Muffins. Pancakes. Ready-to-eat cereal.  Meats and other proteins  Peanut butter. Nuts and seeds.  Dairy  Heavy cream. Whipped cream. Cream cheese. Sour cream. Ice cream. Custard. Pudding.  Beverages  Meal-replacement beverages. Nutrition shakes. Fruit juice. Sugar-sweetened soft drinks.  Seasonings and condiments  Salad dressing. Mayonnaise. Fausto sauce. Fruit preserves or jelly. Honey. Syrup.  Sweets and desserts  Cake. Cookies. Pie. Pastries. Candy bars. Chocolate.  Fats and oils  Butter or margarine. Oil. Gravy.  Other foods  Meal-replacement bars.  The items listed above may not be a complete list of high-calorie foods and beverages. Contact a dietitian for more options.  Summary  · A high-protein, high-calorie diet can help you gain weight or heal faster after an injury, illness, or surgery.  · To increase your protein and calories, add ingredients such as whole milk, peanut butter, cheese, beans, meat, or seafood to meal items.  · To get enough extra calories each day, include high-calorie foods and beverages at each meal.  · Adding a high-calorie drink or shake can be an easy way to help you get enough calories each day. Talk with your healthcare provider or dietitian about the best options for you.  This information is not intended to replace advice given to you by your health care provider. Make sure you discuss any questions you have with your health care provider.  Document Revised: 11/30/2018 Document Reviewed: 10/30/2018  [employer] Patient Education © 2021 [employer]

## 2021-06-22 NOTE — PROGRESS NOTES
Chief Complaint  Hospital Follow Up Visit    Subjective          Jenn Lee presents to [facility]     Pt is 65 yo female with management of tobacco smoker, COPD, chronic bronchitis  osteoporosis, osteaoarthritis of both hips, multiple joint pain, HLP, HTN, chronic pain, migraine headaches, sp tonsillectomy, sp hysterectomy, compression fracture of thoracic vertebra, chronic hip pain (trochanteric bursitis of left hip), leukocytosis, CKD stage 2, leukocytosis, viamin D deficiency positive cologuard test, history of cataracts, history of skin cancer      4/14/21 in office visit for recheck on pt's above medical issues. . She did have positive cologuard test on 11/17/20 and pt was recommended referral to GI but declined. Pt had labwork on 4/13/21 that showed CBC normal lipid panel stable Vitamin D at 26.1. CMP showed GFR at 91 with normal AST and ALT. Alkaline phosphatase elevated at 121. Pt continues to take her medications for HTN, HLP, migraine headaches, osteoporosis,vitamin D deficiency.  Pt doing fair. BP stable.  She lost 4 lbs since last visit. Continues to smoke 1/2 ppd. She is reluctant to see bone clinic and get colonoscopy.   prolia was prescribed but insurance would not cover for osteoporosis     6/28/21 in office visit for recheck on pt's above medical issues. Pt was recently admitted to [facility]  On /6/21/21 for dyspnea, COPD and coughing up sputum.  Her symptoms started 3 days prior and she had no relief of her symptoms with her rescue inhaler. She continues to smoke 1//2 ppd She denied any chest pain no fever, lightheadedness or dizziness. Her labs on admission showed chloride of 112 clacium of 7.3 protein of 5.3 albumin of 2.8.  PO2 at 52 WBC at 16.3 RBC of 3.46, hemoglobin at 10.8 HCT at 34.0 Chest x-ray showed chronic changes with mild atelectasis.Pt was treated for COPD exacerbation with IV abx IV steroid, albuterol nebuilzier and supplemental oxygen. Pt's condition  improved and she was discharged on 6/22/21. Pt continues to take her medications for HTN, HLP, migraine headaches, osteoporosis. pt states she is feeling better but still coughing up.   VS stable today.  She continues to smoke 1/2 ppd of tobacco. Breathing is stable. Denies any fever or chills.  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          Shortness of Breath  This is a chronic problem. The problem occurs constantly. The problem has been unchanged. Pertinent negatives include no abdominal pain, chest pain, claudication, coryza, ear pain, fever, headaches, hemoptysis, leg pain, leg swelling, neck pain, orthopnea, PND, rash, rhinorrhea, sore throat, sputum production, swollen glands, syncope, vomiting or wheezing. Nothing aggravates the symptoms. Risk factors include smoking. She has tried beta agonist inhalers for the symptoms. The treatment provided mild relief. Her past medical history is significant for chronic lung disease and COPD. There is no history of allergies, aspirin allergies, asthma, bronchiolitis, CAD, DVT, a heart failure, PE, pneumonia or a recent surgery.   COPD  She complains of shortness of breath. There is no hemoptysis, sputum production or wheezing. This is a chronic problem. The current episode started more than 1 year ago. The problem occurs constantly. The problem has been gradually worsening. Associated symptoms include dyspnea on exertion. Pertinent negatives include no appetite change, chest pain, ear congestion, ear pain, fever, headaches, malaise/fatigue, myalgias, nasal congestion, orthopnea, PND, postnasal drip, rhinorrhea, sneezing, sore throat, sweats, trouble swallowing or weight loss. Her symptoms are aggravated by strenuous activity. Her symptoms are alleviated by beta-agonist. She reports moderate improvement on treatment. Her symptoms are not alleviated by  beta-agonist. Risk factors for lung disease include smoking/tobacco exposure. Her past medical history is significant for COPD. There is no history of asthma, bronchiectasis, bronchitis, emphysema or pneumonia.     Hyperlipidemia  This is a chronic problem. The current episode started more than 1 year ago. The problem is controlled. Recent lipid tests were reviewed and are variable. She has no history of chronic renal disease, diabetes, hypothyroidism, liver disease, obesity or nephrotic syndrome. Associated symptoms include shortness of breath. Pertinent negatives include no chest pain. Current antihyperlipidemic treatment includes statins. The current treatment provides moderate improvement of lipids. There are no compliance problems.    Chronic Kidney Disease  This is a chronic problem. The current episode started more than 1 year ago. The problem occurs constantly. The problem has been unchanged. Associated symptoms include arthralgias, coughing, fatigue, headaches, numbness and weakness. Pertinent negatives include no chest pain, chills, congestion, diaphoresis, fever, nausea, sore throat or vomiting. She has tried nothing for the symptoms. The treatment provided no relief.   Migraine    The current episode started more than 1 year ago. The problem occurs constantly. The pain is located in the bilateral region. The pain does not radiate. The pain quality is not similar to prior headaches. The quality of the pain is described as pulsating and band-like. The pain is at a severity of 4/10. Associated symptoms include back pain, coughing, numbness and weakness. Pertinent negatives include no abdominal pain, abnormal behavior, anorexia, blurred vision, dizziness, drainage, ear pain, eye pain, eye redness, eye watering, facial sweating, fever, hearing loss, insomnia, loss of balance, muscle aches, nausea, neck pain, phonophobia, photophobia, rhinorrhea, scalp tenderness, seizures, sinus pressure, sore throat, swollen  glands, tingling, tinnitus, visual change, vomiting or weight loss. The symptoms are aggravated by bright light. Treatments tried: elavil  The treatment provided moderate relief. Her past medical history is significant for migraine headaches. There is no history of cancer, cluster headaches, hypertension, immunosuppression, migraines in the family, obesity, pseudotumor cerebri, recent head traumas, sinus disease or TMJ.   Hypertension   This is a chronic problem. The current episode started more than 1 year ago. The problem is unchanged. The problem is controlled. Associated symptoms include shortness of breath. Pertinent negatives include no anxiety, blurred vision, chest pain, headaches, malaise/fatigue, neck pain, orthopnea, palpitations, PND or sweats. There are no known risk factors for coronary artery disease. Past treatments include beta blockers and calcium channel blockers. Current antihypertension treatment includes beta blockers and calcium channel blockers. The current treatment provides no improvement. There are no compliance problems.  There is no history of angina, kidney disease, CAD/MI, CVA, heart failure, left ventricular hypertrophy, PVD or retinopathy. There is no history of chronic renal disease, coarctation of the aorta, hyperaldosteronism, hypercortisolism, hyperparathyroidism, a hypertension causing med, pheochromocytoma, renovascular disease, sleep apnea or a thyroid problem.   Back Pain   This is a recurrent problem. The current episode started more than 1 year ago. The problem occurs daily. The problem has been waxing and waning since onset. The pain is present in the sacro-iliac and thoracic spine. The quality of the pain is described as aching. The pain does not radiate. The pain is at a severity of 6/10. The pain is moderate. The pain is the same all the time. The symptoms are aggravated by bending, lying down, position, sitting and standing. Stiffness is present all day. Associated  "symptoms include numbness. Pertinent negatives include no abdominal pain, bladder incontinence, bowel incontinence, chest pain, dysuria, fever, leg pain, paresis, paresthesias, pelvic pain, perianal numbness, tingling, weakness or weight loss. Risk factors include history of osteoporosis. The treatment provided no relief    Objective   Vital Signs:   /58 (BP Location: Left arm, Patient Position: Sitting, Cuff Size: Adult)   Pulse 88   Temp 97.8 °F (36.6 °C)   Ht 157.5 cm (62\")   Wt 41 kg (90 lb 6.4 oz)   SpO2 97%   BMI 16.53 kg/m²     Physical Exam  Vitals and nursing note reviewed.   Constitutional:       Appearance: She is well-developed. She is not diaphoretic.      Comments: Underweight    HENT:      Head: Normocephalic and atraumatic.      Right Ear: External ear normal.   Eyes:      Conjunctiva/sclera: Conjunctivae normal.      Pupils: Pupils are equal, round, and reactive to light.   Cardiovascular:      Rate and Rhythm: Normal rate and regular rhythm.      Heart sounds: Normal heart sounds. No murmur heard.     Pulmonary:      Effort: No respiratory distress.      Comments: Decreased breath sounds   Abdominal:      General: Bowel sounds are normal. There is no distension.      Palpations: Abdomen is soft.      Tenderness: There is no abdominal tenderness.   Musculoskeletal:         General: Tenderness present. No deformity. Normal range of motion.      Cervical back: Normal range of motion and neck supple.   Skin:     General: Skin is warm.      Coloration: Skin is not pale.      Findings: No erythema or rash.   Neurological:      Mental Status: She is alert and oriented to person, place, and time.      Cranial Nerves: No cranial nerve deficit.   Psychiatric:         Behavior: Behavior normal.        Result Review :                 Assessment and Plan    Diagnoses and all orders for this visit:    1. Encounter for screening for lung cancer (Primary)  -     CT Chest Low Dose Wo; Future    2. Mixed " hyperlipidemia  -     CBC Auto Differential; Future  -     Comprehensive Metabolic Panel; Future  -     C-reactive protein; Future    3. Stage 2 chronic kidney disease  -     CBC Auto Differential; Future  -     Comprehensive Metabolic Panel; Future  -     C-reactive protein; Future    4. Tobacco user  -     CBC Auto Differential; Future  -     Comprehensive Metabolic Panel; Future  -     C-reactive protein; Future    5. COPD with exacerbation (CMS/HCC)  -     CBC Auto Differential; Future  -     Comprehensive Metabolic Panel; Future  -     C-reactive protein; Future    6. Chronic bronchitis, unspecified chronic bronchitis type (CMS/HCC)  -     CBC Auto Differential; Future  -     Comprehensive Metabolic Panel; Future  -     C-reactive protein; Future    7. Underweight  -     CBC Auto Differential; Future  -     Comprehensive Metabolic Panel; Future  -     C-reactive protein; Future    8. Unspecified severe protein-calorie malnutrition (CMS/HCC)  -     CBC Auto Differential; Future  -     Comprehensive Metabolic Panel; Future  -     C-reactive protein; Future    9. Age related osteoporosis, unspecified pathological fracture presence  -     CBC Auto Differential; Future  -     Comprehensive Metabolic Panel; Future  -     C-reactive protein; Future    10. Screening mammogram, encounter for  -     Mammo Screening Bilateral With CAD; Future    11. Personal history of nicotine dependence   -     CT Chest Low Dose Wo; Future    Other orders  -     calcium-vitamin D 250-100 MG-UNIT per tablet; Take 1 tablet by mouth 2 (Two) Times a Day.  Dispense: 60 tablet; Refill: 3  -     Fluticasone Furoate-Vilanterol (Breo Ellipta) 100-25 MCG/INH inhaler; Inhale 1 puff Daily.  Dispense: 28 each; Refill: 3     -check labwork  -recommend mammogram screening - will schedule at imaging center .    -recommend colonoscopy  - pt had positive cologuard test  She declined colonoscopy   -recommend COVID-19 vaccination   -recommend tdap and  shingles/pneumonia  vaccination   -COPD/COPD exacerbation/ dyspnea - reviewed [facility] discharged summary, recommend PFTs and referral to Pulmonology. On albuterol PRN. Recommend albuterol nebulizer, smoking cessation. . She will hold referral to Pulmonology for now. Will start on Breo 100 mcg 1 puff daily. Samples provided today.  Pt agreeable to lung cancer screening - schedule CT low dose at imaging center   -migraine headaches - on elavil 50 mg at bedtime on propranolol 40 mg gdaily.   -underweight/moderate protein malnutrition - recommend high calorie diet. BMI at 16.53. recommend high calorie diet, recommend smoking cesation   -CKD stage 2 - gave information. Continue to monitor   -allergic rhinitis - on flonase nasal spray   -HTN -  controlled on norvasc 10 mg daily.   propranolol 50 mg PO q daily.   -HLP on simvastatin 20 mg Po qhs   -old compression fracture of thoracic spine - referred to Spine Surgeon but surgery not indicated   on elavil 50 mg PO qhs  Recommend  Pain management but pt declined. Try tylenol 3 with codeine. Gave 30 pills   - osteoporosis - pt already on citracal with Vitamin D.r ecommend prolia but pt declined. Was on fosamax but has been on this for years. prolia injection declined by insurance. Recommend bone clinic but pt declined   - tobacco user advised pt to quit smoking >5 minutes.  Pt declines nicotine patches or chantix. Recommend 1 800 QUIT NOW  -advised pt to be safe and call with questions and concerns  -advised pt to go to ER or call 911 if symptoms worrisome or severe  -advised pt to followup with specialist and referrals   -advised pt to be safe during COVID-19 pandemic   I spent 30 minutes caring for Jenn on this date of service. This time includes time spent by me in the following activities: preparing for the visit, reviewing tests, obtaining and/or reviewing a separately obtained history, performing a medically appropriate examination and/or evaluation, counseling and  educating the patient/family/caregiver, ordering medications, tests, or procedures, referring and communicating with other health care professionals, documenting information in the medical record, independently interpreting results and communicating that information with the patient/family/caregiver and care coordination.   -recheck in 2 months         This document has been electronically signed by Diego Gusman MD on June 28, 2021 11:55 CDT          Follow Up   Return in about 2 months (around 8/28/2021).  Patient was given instructions and counseling regarding her condition or for health maintenance advice. Please see specific information pulled into the AVS if appropriate.

## 2021-06-28 ENCOUNTER — OFFICE VISIT (OUTPATIENT)
Dept: FAMILY MEDICINE CLINIC | Facility: CLINIC | Age: 66
End: 2021-06-28

## 2021-06-28 VITALS
HEIGHT: 62 IN | TEMPERATURE: 97.8 F | SYSTOLIC BLOOD PRESSURE: 108 MMHG | WEIGHT: 90.4 LBS | BODY MASS INDEX: 16.63 KG/M2 | HEART RATE: 88 BPM | OXYGEN SATURATION: 97 % | DIASTOLIC BLOOD PRESSURE: 58 MMHG

## 2021-06-28 DIAGNOSIS — Z72.0 TOBACCO USER: ICD-10-CM

## 2021-06-28 DIAGNOSIS — N18.2 STAGE 2 CHRONIC KIDNEY DISEASE: ICD-10-CM

## 2021-06-28 DIAGNOSIS — Z12.2 ENCOUNTER FOR SCREENING FOR LUNG CANCER: Primary | ICD-10-CM

## 2021-06-28 DIAGNOSIS — Z12.31 SCREENING MAMMOGRAM, ENCOUNTER FOR: ICD-10-CM

## 2021-06-28 DIAGNOSIS — R63.6 UNDERWEIGHT: ICD-10-CM

## 2021-06-28 DIAGNOSIS — J42 CHRONIC BRONCHITIS, UNSPECIFIED CHRONIC BRONCHITIS TYPE (HCC): ICD-10-CM

## 2021-06-28 DIAGNOSIS — Z87.891 PERSONAL HISTORY OF NICOTINE DEPENDENCE: ICD-10-CM

## 2021-06-28 DIAGNOSIS — M81.0 AGE RELATED OSTEOPOROSIS, UNSPECIFIED PATHOLOGICAL FRACTURE PRESENCE: ICD-10-CM

## 2021-06-28 DIAGNOSIS — E43 UNSPECIFIED SEVERE PROTEIN-CALORIE MALNUTRITION (HCC): ICD-10-CM

## 2021-06-28 DIAGNOSIS — E78.2 MIXED HYPERLIPIDEMIA: ICD-10-CM

## 2021-06-28 DIAGNOSIS — J44.1 COPD WITH EXACERBATION (HCC): ICD-10-CM

## 2021-06-28 PROCEDURE — 99214 OFFICE O/P EST MOD 30 MIN: CPT | Performed by: FAMILY MEDICINE

## 2021-06-30 RX ORDER — ERGOCALCIFEROL 1.25 MG/1
CAPSULE ORAL
Qty: 5 CAPSULE | Refills: 3 | Status: SHIPPED | OUTPATIENT
Start: 2021-06-30 | End: 2021-09-15

## 2021-07-01 ENCOUNTER — LAB (OUTPATIENT)
Dept: LAB | Facility: HOSPITAL | Age: 66
End: 2021-07-01

## 2021-07-01 DIAGNOSIS — J42 CHRONIC BRONCHITIS, UNSPECIFIED CHRONIC BRONCHITIS TYPE (HCC): ICD-10-CM

## 2021-07-01 DIAGNOSIS — Z72.0 TOBACCO USER: ICD-10-CM

## 2021-07-01 DIAGNOSIS — R63.6 UNDERWEIGHT: ICD-10-CM

## 2021-07-01 DIAGNOSIS — M81.0 AGE RELATED OSTEOPOROSIS, UNSPECIFIED PATHOLOGICAL FRACTURE PRESENCE: ICD-10-CM

## 2021-07-01 DIAGNOSIS — E78.2 MIXED HYPERLIPIDEMIA: ICD-10-CM

## 2021-07-01 DIAGNOSIS — N18.2 STAGE 2 CHRONIC KIDNEY DISEASE: ICD-10-CM

## 2021-07-01 DIAGNOSIS — E43 UNSPECIFIED SEVERE PROTEIN-CALORIE MALNUTRITION (HCC): ICD-10-CM

## 2021-07-01 DIAGNOSIS — J44.1 COPD WITH EXACERBATION (HCC): ICD-10-CM

## 2021-07-01 LAB
ALBUMIN SERPL-MCNC: 3.3 G/DL (ref 3.5–5.2)
ALBUMIN/GLOB SERPL: 1.2 G/DL
ALP SERPL-CCNC: 103 U/L (ref 39–117)
ALT SERPL W P-5'-P-CCNC: 16 U/L (ref 1–33)
ANION GAP SERPL CALCULATED.3IONS-SCNC: 10.6 MMOL/L (ref 5–15)
AST SERPL-CCNC: 10 U/L (ref 1–32)
BASOPHILS # BLD AUTO: 0.07 10*3/MM3 (ref 0–0.2)
BASOPHILS NFR BLD AUTO: 0.4 % (ref 0–1.5)
BILIRUB SERPL-MCNC: <0.2 MG/DL (ref 0–1.2)
BUN SERPL-MCNC: 14 MG/DL (ref 8–23)
BUN/CREAT SERPL: 17.5 (ref 7–25)
CALCIUM SPEC-SCNC: 9.1 MG/DL (ref 8.6–10.5)
CHLORIDE SERPL-SCNC: 102 MMOL/L (ref 98–107)
CO2 SERPL-SCNC: 25.4 MMOL/L (ref 22–29)
CREAT SERPL-MCNC: 0.8 MG/DL (ref 0.57–1)
CRP SERPL-MCNC: 2.3 MG/DL (ref 0–0.5)
DEPRECATED RDW RBC AUTO: 43.7 FL (ref 37–54)
EOSINOPHIL # BLD AUTO: 0.22 10*3/MM3 (ref 0–0.4)
EOSINOPHIL NFR BLD AUTO: 1.2 % (ref 0.3–6.2)
ERYTHROCYTE [DISTWIDTH] IN BLOOD BY AUTOMATED COUNT: 12.6 % (ref 12.3–15.4)
GFR SERPL CREATININE-BSD FRML MDRD: 72 ML/MIN/1.73
GLOBULIN UR ELPH-MCNC: 2.8 GM/DL
GLUCOSE SERPL-MCNC: 67 MG/DL (ref 65–99)
HCT VFR BLD AUTO: 37.8 % (ref 34–46.6)
HGB BLD-MCNC: 12.9 G/DL (ref 12–15.9)
IMM GRANULOCYTES # BLD AUTO: 0.23 10*3/MM3 (ref 0–0.05)
IMM GRANULOCYTES NFR BLD AUTO: 1.3 % (ref 0–0.5)
LYMPHOCYTES # BLD AUTO: 3.71 10*3/MM3 (ref 0.7–3.1)
LYMPHOCYTES NFR BLD AUTO: 21 % (ref 19.6–45.3)
MCH RBC QN AUTO: 32.3 PG (ref 26.6–33)
MCHC RBC AUTO-ENTMCNC: 34.1 G/DL (ref 31.5–35.7)
MCV RBC AUTO: 94.5 FL (ref 79–97)
MONOCYTES # BLD AUTO: 1.37 10*3/MM3 (ref 0.1–0.9)
MONOCYTES NFR BLD AUTO: 7.8 % (ref 5–12)
NEUTROPHILS NFR BLD AUTO: 12.06 10*3/MM3 (ref 1.7–7)
NEUTROPHILS NFR BLD AUTO: 68.3 % (ref 42.7–76)
NRBC BLD AUTO-RTO: 0.1 /100 WBC (ref 0–0.2)
PLATELET # BLD AUTO: 419 10*3/MM3 (ref 140–450)
PMV BLD AUTO: 9.7 FL (ref 6–12)
POTASSIUM SERPL-SCNC: 4.4 MMOL/L (ref 3.5–5.2)
PROT SERPL-MCNC: 6.1 G/DL (ref 6–8.5)
RBC # BLD AUTO: 4 10*6/MM3 (ref 3.77–5.28)
SODIUM SERPL-SCNC: 138 MMOL/L (ref 136–145)
WBC # BLD AUTO: 17.66 10*3/MM3 (ref 3.4–10.8)

## 2021-07-01 PROCEDURE — 85025 COMPLETE CBC W/AUTO DIFF WBC: CPT

## 2021-07-01 PROCEDURE — 80053 COMPREHEN METABOLIC PANEL: CPT

## 2021-07-01 PROCEDURE — 86140 C-REACTIVE PROTEIN: CPT

## 2021-07-02 ENCOUNTER — TELEPHONE (OUTPATIENT)
Dept: FAMILY MEDICINE CLINIC | Facility: CLINIC | Age: 66
End: 2021-07-02

## 2021-07-02 RX ORDER — DOXYCYCLINE HYCLATE 100 MG/1
100 CAPSULE ORAL 2 TIMES DAILY
Qty: 14 CAPSULE | Refills: 0 | Status: SHIPPED | OUTPATIENT
Start: 2021-07-02 | End: 2021-07-09

## 2021-07-02 NOTE — TELEPHONE ENCOUNTER
Gave pt results and recommendations. She voiced understanding and would like medication sent to pharmacy.

## 2021-07-02 NOTE — TELEPHONE ENCOUNTER
----- Message from Diego Gusman MD sent at 7/2/2021  8:09 AM CDT -----  Please call pt    Her CMP shows kidney function with GFR at 72 from 91. Recommend more water intake    Also albumin low at 3.30 and recommend more protein in diet.     Her WBC continues to show elevation of WBC and neutrophil count high. If pt continues to have symptoms of cough, dyspnea, fatigue may need to be on doxycycline for another 7 days. If pt agreeable give 100 mg PO BID for 7 days with 14 pills and 3 refills. Recommend pt start taking probiotics shortly after    Recheck on next visit thanks

## 2021-08-03 ENCOUNTER — TELEPHONE (OUTPATIENT)
Dept: FAMILY MEDICINE CLINIC | Facility: CLINIC | Age: 66
End: 2021-08-03

## 2021-08-03 NOTE — TELEPHONE ENCOUNTER
----- Message from Diego Gusman MD sent at 8/3/2021  7:57 AM CDT -----  Regarding: CT of chest and mammogram  Please let pt know that recent mammogram screenign on 7/30/21 was negative. Repeat in 1 year    CT of chest low dose on 7/30/21 showed benign finding of groundglass opacity in right upper lobe 17mm in size. No other nodules identified. Recommend repeat in 1 year    Recheck on next visit thanks

## 2021-08-04 DIAGNOSIS — Z12.31 SCREENING MAMMOGRAM, ENCOUNTER FOR: ICD-10-CM

## 2021-08-04 DIAGNOSIS — Z87.891 PERSONAL HISTORY OF NICOTINE DEPENDENCE: ICD-10-CM

## 2021-08-04 DIAGNOSIS — Z12.2 ENCOUNTER FOR SCREENING FOR LUNG CANCER: ICD-10-CM

## 2021-08-17 NOTE — PROGRESS NOTES
Chief Complaint  COPD, Migraine, Chronic Kidney Disease, Allergies, Hyperlipidemia, and Hypertension    Subjective          Jenn Lee presents to [facility]  History of Present Illness     Pt is 67 yo female with management of tobacco smoker, COPD, chronic bronchitis  osteoporosis, osteaoarthritis of both hips, multiple joint pain, HLP, HTN, chronic pain, migraine headaches, sp tonsillectomy, sp hysterectomy, compression fracture of thoracic vertebra, chronic hip pain (trochanteric bursitis of left hip), leukocytosis, CKD stage 2, leukocytosis, viamin D deficiency positive cologuard test, history of cataracts, history of skin cancer         6/28/21 in office visit for recheck on pt's above medical issues. Pt was recently admitted to [facility]  On /6/21/21 for dyspnea, COPD and coughing up sputum.  Her symptoms started 3 days prior and she had no relief of her symptoms with her rescue inhaler. She continues to smoke 1//2 ppd She denied any chest pain no fever, lightheadedness or dizziness. Her labs on admission showed chloride of 112 clacium of 7.3 protein of 5.3 albumin of 2.8.  PO2 at 52 WBC at 16.3 RBC of 3.46, hemoglobin at 10.8 HCT at 34.0 Chest x-ray showed chronic changes with mild atelectasis.Pt was treated for COPD exacerbation with IV abx IV steroid, albuterol nebuilzier and supplemental oxygen. Pt's condition improved and she was discharged on 6/22/21. Pt continues to take her medications for HTN, HLP, migraine headaches, osteoporosis. pt states she is feeling better but still coughing up.   VS stable today.  She continues to smoke 1/2 ppd of tobacco. Breathing is stable. Denies any fever or chills.  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     8/31/21 in office visit for recheck on pt's above medical issues. Pt had labwork done on 7/1/21 that showed on CBC WBC  at 17.66 hemoglobin normal CMP showed GFR at 72 from 91. Albumin at 3.30. CRP was high at 2.30. pt did have CT  Of chest lung cancer screening that showed 17 mm ground glass opacity in right upper lung that is considered benign. One year followup  Was recommended. Her mammogram on 6/28/21 was normal. Pt continues to take her medications for HTN, migraine headaches, HLP,  COPD,  Chronic back pain, allergic rhinitis.  Her recent mammogram screening was normal. CT of chest showed benign right upper lobe lung. Lesion. She continues to smoke tobacco about 1 ppd.. She                Shortness of Breath  This is a chronic problem. The problem occurs constantly. The problem has been unchanged. Pertinent negatives include no abdominal pain, chest pain, claudication, coryza, ear pain, fever, headaches, hemoptysis, leg pain, leg swelling, neck pain, orthopnea, PND, rash, rhinorrhea, sore throat, sputum production, swollen glands, syncope, vomiting or wheezing. Nothing aggravates the symptoms. Risk factors include smoking. She has tried beta agonist inhalers for the symptoms. The treatment provided mild relief. Her past medical history is significant for chronic lung disease and COPD. There is no history of allergies, aspirin allergies, asthma, bronchiolitis, CAD, DVT, a heart failure, PE, pneumonia or a recent surgery.   COPD  She complains of shortness of breath. There is no hemoptysis, sputum production or wheezing. This is a chronic problem. The current episode started more than 1 year ago. The problem occurs constantly. The problem has been gradually worsening. Associated symptoms include dyspnea on exertion. Pertinent negatives include no appetite change, chest pain, ear congestion, ear pain, fever, headaches, malaise/fatigue, myalgias, nasal congestion, orthopnea, PND, postnasal drip, rhinorrhea, sneezing, sore throat, sweats, trouble swallowing or weight loss. Her symptoms are aggravated by strenuous activity. Her  symptoms are alleviated by beta-agonist. She reports moderate improvement on treatment. Her symptoms are not alleviated by beta-agonist. Risk factors for lung disease include smoking/tobacco exposure. Her past medical history is significant for COPD. There is no history of asthma, bronchiectasis, bronchitis, emphysema or pneumonia.    Hyperlipidemia  This is a chronic problem. The current episode started more than 1 year ago. The problem is controlled. Recent lipid tests were reviewed and are variable. She has no history of chronic renal disease, diabetes, hypothyroidism, liver disease, obesity or nephrotic syndrome. Associated symptoms include shortness of breath. Pertinent negatives include no chest pain. Current antihyperlipidemic treatment includes statins. The current treatment provides moderate improvement of lipids. There are no compliance problems.    Chronic Kidney Disease  This is a chronic problem. The current episode started more than 1 year ago. The problem occurs constantly. The problem has been unchanged. Associated symptoms include arthralgias, coughing, fatigue, headaches, numbness and weakness. Pertinent negatives include no chest pain, chills, congestion, diaphoresis, fever, nausea, sore throat or vomiting. She has tried nothing for the symptoms. The treatment provided no relief.   Migraine    The current episode started more than 1 year ago. The problem occurs constantly. The pain is located in the bilateral region. The pain does not radiate. The pain quality is not similar to prior headaches. The quality of the pain is described as pulsating and band-like. The pain is at a severity of 4/10. Associated symptoms include back pain, coughing, numbness and weakness. Pertinent negatives include no abdominal pain, abnormal behavior, anorexia, blurred vision, dizziness, drainage, ear pain, eye pain, eye redness, eye watering, facial sweating, fever, hearing loss, insomnia, loss of balance, muscle  aches, nausea, neck pain, phonophobia, photophobia, rhinorrhea, scalp tenderness, seizures, sinus pressure, sore throat, swollen glands, tingling, tinnitus, visual change, vomiting or weight loss. The symptoms are aggravated by bright light. Treatments tried: elavil  The treatment provided moderate relief. Her past medical history is significant for migraine headaches. There is no history of cancer, cluster headaches, hypertension, immunosuppression, migraines in the family, obesity, pseudotumor cerebri, recent head traumas, sinus disease or TMJ.   Hypertension   This is a chronic problem. The current episode started more than 1 year ago. The problem is unchanged. The problem is controlled. Associated symptoms include shortness of breath. Pertinent negatives include no anxiety, blurred vision, chest pain, headaches, malaise/fatigue, neck pain, orthopnea, palpitations, PND or sweats. There are no known risk factors for coronary artery disease. Past treatments include beta blockers and calcium channel blockers. Current antihypertension treatment includes beta blockers and calcium channel blockers. The current treatment provides no improvement. There are no compliance problems.  There is no history of angina, kidney disease, CAD/MI, CVA, heart failure, left ventricular hypertrophy, PVD or retinopathy. There is no history of chronic renal disease, coarctation of the aorta, hyperaldosteronism, hypercortisolism, hyperparathyroidism, a hypertension causing med, pheochromocytoma, renovascular disease, sleep apnea or a thyroid problem.   Back Pain   This is a recurrent problem. The current episode started more than 1 year ago. The problem occurs daily. The problem has been waxing and waning since onset. The pain is present in the sacro-iliac and thoracic spine. The quality of the pain is described as aching. The pain does not radiate. The pain is at a severity of 6/10. The pain is moderate. The pain is the same all the time.  "The symptoms are aggravated by bending, lying down, position, sitting and standing. Stiffness is present all day. Associated symptoms include numbness. Pertinent negatives include no abdominal pain, bladder incontinence, bowel incontinence, chest pain, dysuria, fever, leg pain, paresis, paresthesias, pelvic pain, perianal numbness, tingling, weakness or weight loss. Risk factors include history of osteoporosis. The treatment provided no relief  Objective   Vital Signs:   /62 (BP Location: Right arm, Patient Position: Sitting, Cuff Size: Adult)   Pulse 80   Temp 99.6 °F (37.6 °C)   Ht 157.5 cm (62\")   Wt 41.2 kg (90 lb 12.8 oz)   SpO2 98%   BMI 16.61 kg/m²     Physical Exam  Vitals and nursing note reviewed.   Constitutional:       Appearance: She is well-developed. She is not diaphoretic.      Comments: Thin appearance    HENT:      Head: Normocephalic and atraumatic.      Right Ear: External ear normal.   Eyes:      Conjunctiva/sclera: Conjunctivae normal.      Pupils: Pupils are equal, round, and reactive to light.   Cardiovascular:      Rate and Rhythm: Normal rate and regular rhythm.      Heart sounds: Normal heart sounds. No murmur heard.     Pulmonary:      Effort: Pulmonary effort is normal. No respiratory distress.      Breath sounds: Normal breath sounds.   Abdominal:      General: Bowel sounds are normal. There is no distension.      Palpations: Abdomen is soft.      Tenderness: There is no abdominal tenderness.   Musculoskeletal:         General: Tenderness present. No deformity. Normal range of motion.      Cervical back: Normal range of motion and neck supple.   Skin:     General: Skin is warm.      Coloration: Skin is not pale.      Findings: No erythema or rash.   Neurological:      Mental Status: She is alert and oriented to person, place, and time.      Cranial Nerves: No cranial nerve deficit.   Psychiatric:         Behavior: Behavior normal.        Result Review :               "   Assessment and Plan    Diagnoses and all orders for this visit:    1. Essential hypertension (Primary)  -     CBC Auto Differential; Future  -     Comprehensive Metabolic Panel; Future  -     Lipid Panel; Future  -     Cancel: TSH; Future  -     Cancel: T4, Free; Future  -     Vitamin D 25 Hydroxy; Future  -     Vitamin B12; Future    2. Unspecified severe protein-calorie malnutrition (CMS/HCC)  -     CBC Auto Differential; Future  -     Comprehensive Metabolic Panel; Future  -     Lipid Panel; Future  -     Cancel: TSH; Future  -     Cancel: T4, Free; Future  -     Vitamin D 25 Hydroxy; Future  -     Vitamin B12; Future    3. Tobacco user  -     CBC Auto Differential; Future  -     Comprehensive Metabolic Panel; Future  -     Lipid Panel; Future  -     Cancel: TSH; Future  -     Cancel: T4, Free; Future  -     Vitamin D 25 Hydroxy; Future  -     Vitamin B12; Future    4. Stage 2 chronic kidney disease  -     CBC Auto Differential; Future  -     Comprehensive Metabolic Panel; Future  -     Lipid Panel; Future  -     Cancel: TSH; Future  -     Cancel: T4, Free; Future  -     Vitamin D 25 Hydroxy; Future  -     Vitamin B12; Future    5. Other migraine without status migrainosus, not intractable  -     CBC Auto Differential; Future  -     Comprehensive Metabolic Panel; Future  -     Lipid Panel; Future  -     Cancel: TSH; Future  -     Cancel: T4, Free; Future  -     Vitamin D 25 Hydroxy; Future  -     Vitamin B12; Future    6. Mixed hyperlipidemia  -     CBC Auto Differential; Future  -     Comprehensive Metabolic Panel; Future  -     Lipid Panel; Future  -     Cancel: TSH; Future  -     Cancel: T4, Free; Future  -     Vitamin D 25 Hydroxy; Future  -     Vitamin B12; Future    7. Chronic midline thoracic back pain  -     CBC Auto Differential; Future  -     Comprehensive Metabolic Panel; Future  -     Lipid Panel; Future  -     Cancel: TSH; Future  -     Cancel: T4, Free; Future  -     Vitamin D 25 Hydroxy;  Future  -     Vitamin B12; Future    8. Positive colorectal cancer screening using Cologuard test  -     CBC Auto Differential; Future  -     Comprehensive Metabolic Panel; Future  -     Lipid Panel; Future  -     Cancel: TSH; Future  -     Cancel: T4, Free; Future  -     Vitamin D 25 Hydroxy; Future  -     Vitamin B12; Future    9. Underweight  -     CBC Auto Differential; Future  -     Comprehensive Metabolic Panel; Future  -     Lipid Panel; Future  -     Cancel: TSH; Future  -     Cancel: T4, Free; Future  -     Vitamin D 25 Hydroxy; Future  -     Vitamin B12; Future    10. Age related osteoporosis, unspecified pathological fracture presence  -     CBC Auto Differential; Future  -     Comprehensive Metabolic Panel; Future  -     Lipid Panel; Future  -     Cancel: TSH; Future  -     Cancel: T4, Free; Future  -     Vitamin D 25 Hydroxy; Future  -     Vitamin B12; Future    11. Chronic obstructive pulmonary disease, unspecified COPD type (CMS/HCC)  -     CBC Auto Differential; Future  -     Comprehensive Metabolic Panel; Future  -     Lipid Panel; Future  -     Cancel: TSH; Future  -     Cancel: T4, Free; Future  -     Vitamin D 25 Hydroxy; Future  -     Vitamin B12; Future    12. Chronic bronchitis, unspecified chronic bronchitis type (CMS/HCC)    13. Leukocytosis, unspecified type    Other orders  -     albuterol sulfate  (90 Base) MCG/ACT inhaler; Inhale 2 puffs Every 4 (Four) Hours As Needed for Wheezing.  Dispense: 18 g; Refill: 3  -     amitriptyline (ELAVIL) 25 MG tablet; Take 2 tablets by mouth Every Night.  Dispense: 180 tablet; Refill: 1  -     Discontinue: Fluticasone Furoate-Vilanterol (Breo Ellipta) 100-25 MCG/INH inhaler; Inhale 1 puff Daily.  Dispense: 28 each; Refill: 3  -     Discontinue: Fluticasone Furoate-Vilanterol (Breo Ellipta) 100-25 MCG/INH inhaler; Inhale 1 puff Daily.  Dispense: 28 each; Refill: 3  -     Fluticasone Furoate-Vilanterol (Breo Ellipta) 200-25 MCG/INH inhaler; Inhale 1  puff Daily.  Dispense: 30 each; Refill: 3       -recommend labwork   -recommend colonoscopy  - pt had positive cologuard test. I urged pt to get colnooscopy screening   She declined colonoscopy   -recommend COVID-19 vaccination   -recommend tdap and shingles/pneumonia  vaccination - pneumonia 23 vaccination today   -COPD/COPD exacerbation/ dyspnea - reviewed [facility] discharged summary, recommend PFTs and referral to Pulmonology. On albuterol PRN. Recommend albuterol nebulizer, smoking cessation. . She will hold referral to Pulmonology for now.. on Breo will  Go up from 100 to 200 mcg daily. Samples provided today. Will also give 1 gram of rocephin today.    -migraine headaches - on elavil 50 mg at bedtime on propranolol 40 mg gdaily.   -underweight/moderate protein malnutrition - recommend high calorie diet. BMI at 16.61. recommend high calorie diet, recommend smoking cesation   -CKD stage 2 - gave information. Continue to monitor   -allergic rhinitis - on flonase nasal spray   -HTN -  controlled on norvasc 10 mg daily.   propranolol 50 mg PO q daily.   -HLP on simvastatin 20 mg Po qhs   -old compression fracture of thoracic spine - referred to Spine Surgeon but surgery not indicated   on elavil 50 mg PO qhs  Recommend  Pain management but pt declined. Try tylenol 3 with codeine. Gave 30 pills   - osteoporosis - pt already on citracal with Vitamin D.r ecommend prolia but pt declined. Was on fosamax but has been on this for years. prolia injection declined by insurance. Recommend bone clinic but pt declined   - tobacco user advised pt to quit smoking >5 minutes.  Pt declines nicotine patches or chantix. Recommend 1 800 QUIT NOW  -advised pt to be safe and call with questions and concerns  -advised pt to go to ER or call 911 if symptoms worrisome or severe  -advised pt to followup with specialist and referrals   -advised pt to be safe during COVID-19 pandemic   I spent 30 minutes caring for Candkrista on this date of service.  This time includes time spent by me in the following activities: preparing for the visit, reviewing tests, obtaining and/or reviewing a separately obtained history, performing a medically appropriate examination and/or evaluation, counseling and educating the patient/family/caregiver, ordering medications, tests, or procedures, referring and communicating with other health care professionals, documenting information in the medical record, independently interpreting results and communicating that information with the patient/family/caregiver and care coordination.   -recheck in 3 months         This document has been electronically signed by Diego Gusman MD on August 31, 2021 13:22 CDT          Follow Up   Return in about 3 months (around 11/30/2021).  Patient was given instructions and counseling regarding her condition or for health maintenance advice. Please see specific information pulled into the AVS if appropriate.

## 2021-08-25 PROBLEM — G89.29 CHRONIC MIDLINE THORACIC BACK PAIN: Status: ACTIVE | Noted: 2021-08-25

## 2021-08-25 PROBLEM — M54.6 CHRONIC MIDLINE THORACIC BACK PAIN: Status: ACTIVE | Noted: 2021-08-25

## 2021-08-31 ENCOUNTER — OFFICE VISIT (OUTPATIENT)
Dept: FAMILY MEDICINE CLINIC | Facility: CLINIC | Age: 66
End: 2021-08-31

## 2021-08-31 VITALS
HEART RATE: 80 BPM | OXYGEN SATURATION: 98 % | TEMPERATURE: 99.6 F | DIASTOLIC BLOOD PRESSURE: 62 MMHG | BODY MASS INDEX: 16.71 KG/M2 | SYSTOLIC BLOOD PRESSURE: 118 MMHG | WEIGHT: 90.8 LBS | HEIGHT: 62 IN

## 2021-08-31 DIAGNOSIS — D72.829 LEUKOCYTOSIS, UNSPECIFIED TYPE: ICD-10-CM

## 2021-08-31 DIAGNOSIS — J42 CHRONIC BRONCHITIS, UNSPECIFIED CHRONIC BRONCHITIS TYPE (HCC): ICD-10-CM

## 2021-08-31 DIAGNOSIS — Z23 NEED FOR VACCINATION: ICD-10-CM

## 2021-08-31 DIAGNOSIS — G89.29 CHRONIC MIDLINE THORACIC BACK PAIN: ICD-10-CM

## 2021-08-31 DIAGNOSIS — R63.6 UNDERWEIGHT: ICD-10-CM

## 2021-08-31 DIAGNOSIS — E43 UNSPECIFIED SEVERE PROTEIN-CALORIE MALNUTRITION (HCC): ICD-10-CM

## 2021-08-31 DIAGNOSIS — J44.9 CHRONIC OBSTRUCTIVE PULMONARY DISEASE, UNSPECIFIED COPD TYPE (HCC): ICD-10-CM

## 2021-08-31 DIAGNOSIS — Z72.0 TOBACCO USER: ICD-10-CM

## 2021-08-31 DIAGNOSIS — G43.809 OTHER MIGRAINE WITHOUT STATUS MIGRAINOSUS, NOT INTRACTABLE: ICD-10-CM

## 2021-08-31 DIAGNOSIS — I10 ESSENTIAL HYPERTENSION: Primary | ICD-10-CM

## 2021-08-31 DIAGNOSIS — E78.2 MIXED HYPERLIPIDEMIA: ICD-10-CM

## 2021-08-31 DIAGNOSIS — M54.6 CHRONIC MIDLINE THORACIC BACK PAIN: ICD-10-CM

## 2021-08-31 DIAGNOSIS — N18.2 STAGE 2 CHRONIC KIDNEY DISEASE: ICD-10-CM

## 2021-08-31 DIAGNOSIS — J06.9 ACUTE URI: ICD-10-CM

## 2021-08-31 DIAGNOSIS — R19.5 POSITIVE COLORECTAL CANCER SCREENING USING COLOGUARD TEST: ICD-10-CM

## 2021-08-31 DIAGNOSIS — M81.0 AGE RELATED OSTEOPOROSIS, UNSPECIFIED PATHOLOGICAL FRACTURE PRESENCE: ICD-10-CM

## 2021-08-31 PROCEDURE — 96372 THER/PROPH/DIAG INJ SC/IM: CPT | Performed by: FAMILY MEDICINE

## 2021-08-31 PROCEDURE — G0009 ADMIN PNEUMOCOCCAL VACCINE: HCPCS | Performed by: FAMILY MEDICINE

## 2021-08-31 PROCEDURE — 90732 PPSV23 VACC 2 YRS+ SUBQ/IM: CPT | Performed by: FAMILY MEDICINE

## 2021-08-31 PROCEDURE — 99214 OFFICE O/P EST MOD 30 MIN: CPT | Performed by: FAMILY MEDICINE

## 2021-08-31 RX ORDER — AMITRIPTYLINE HYDROCHLORIDE 25 MG/1
50 TABLET, FILM COATED ORAL NIGHTLY
Qty: 180 TABLET | Refills: 1 | Status: SHIPPED | OUTPATIENT
Start: 2021-08-31 | End: 2022-03-01

## 2021-08-31 RX ORDER — CEFTRIAXONE 1 G/1
1 INJECTION, POWDER, FOR SOLUTION INTRAMUSCULAR; INTRAVENOUS ONCE
Status: COMPLETED | OUTPATIENT
Start: 2021-08-31 | End: 2021-08-31

## 2021-08-31 RX ORDER — ALBUTEROL SULFATE 90 UG/1
2 AEROSOL, METERED RESPIRATORY (INHALATION) EVERY 4 HOURS PRN
Qty: 18 G | Refills: 3 | Status: SHIPPED | OUTPATIENT
Start: 2021-08-31 | End: 2022-05-31

## 2021-08-31 RX ADMIN — CEFTRIAXONE 1 G: 1 INJECTION, POWDER, FOR SOLUTION INTRAMUSCULAR; INTRAVENOUS at 13:52

## 2021-08-31 NOTE — PROGRESS NOTES
Injection  Injections performed in Left Deltoid and Left Dorsogluteal by Horace Capps MA. Patient tolerated the procedure well without complications.  08/31/21   Horace Capps MA

## 2021-09-15 RX ORDER — ERGOCALCIFEROL 1.25 MG/1
CAPSULE ORAL
Qty: 5 CAPSULE | Refills: 3 | Status: SHIPPED | OUTPATIENT
Start: 2021-09-15 | End: 2023-02-16

## 2021-09-28 ENCOUNTER — OFFICE VISIT (OUTPATIENT)
Dept: FAMILY MEDICINE CLINIC | Facility: CLINIC | Age: 66
End: 2021-09-28

## 2021-09-28 ENCOUNTER — LAB (OUTPATIENT)
Dept: LAB | Facility: HOSPITAL | Age: 66
End: 2021-09-28

## 2021-09-28 ENCOUNTER — TELEPHONE (OUTPATIENT)
Dept: FAMILY MEDICINE CLINIC | Facility: CLINIC | Age: 66
End: 2021-09-28

## 2021-09-28 VITALS
HEIGHT: 62 IN | OXYGEN SATURATION: 99 % | RESPIRATION RATE: 20 BRPM | SYSTOLIC BLOOD PRESSURE: 96 MMHG | TEMPERATURE: 98 F | BODY MASS INDEX: 16.84 KG/M2 | HEART RATE: 76 BPM | DIASTOLIC BLOOD PRESSURE: 62 MMHG | WEIGHT: 91.5 LBS

## 2021-09-28 DIAGNOSIS — G43.809 OTHER MIGRAINE WITHOUT STATUS MIGRAINOSUS, NOT INTRACTABLE: ICD-10-CM

## 2021-09-28 DIAGNOSIS — R63.6 UNDERWEIGHT: ICD-10-CM

## 2021-09-28 DIAGNOSIS — J44.9 CHRONIC OBSTRUCTIVE PULMONARY DISEASE, UNSPECIFIED COPD TYPE (HCC): ICD-10-CM

## 2021-09-28 DIAGNOSIS — Z72.0 TOBACCO USER: ICD-10-CM

## 2021-09-28 DIAGNOSIS — M81.0 AGE RELATED OSTEOPOROSIS, UNSPECIFIED PATHOLOGICAL FRACTURE PRESENCE: ICD-10-CM

## 2021-09-28 DIAGNOSIS — M54.6 CHRONIC MIDLINE THORACIC BACK PAIN: ICD-10-CM

## 2021-09-28 DIAGNOSIS — I10 ESSENTIAL HYPERTENSION: ICD-10-CM

## 2021-09-28 DIAGNOSIS — E43 UNSPECIFIED SEVERE PROTEIN-CALORIE MALNUTRITION (HCC): ICD-10-CM

## 2021-09-28 DIAGNOSIS — R19.5 POSITIVE COLORECTAL CANCER SCREENING USING COLOGUARD TEST: ICD-10-CM

## 2021-09-28 DIAGNOSIS — E78.2 MIXED HYPERLIPIDEMIA: ICD-10-CM

## 2021-09-28 DIAGNOSIS — Z00.00 MEDICARE ANNUAL WELLNESS VISIT, INITIAL: Primary | ICD-10-CM

## 2021-09-28 DIAGNOSIS — N18.2 STAGE 2 CHRONIC KIDNEY DISEASE: ICD-10-CM

## 2021-09-28 DIAGNOSIS — G89.29 CHRONIC MIDLINE THORACIC BACK PAIN: ICD-10-CM

## 2021-09-28 LAB
25(OH)D3 SERPL-MCNC: 41.6 NG/ML (ref 30–100)
ALBUMIN SERPL-MCNC: 4.4 G/DL (ref 3.5–5.2)
ALBUMIN/GLOB SERPL: 1.7 G/DL
ALP SERPL-CCNC: 142 U/L (ref 39–117)
ALT SERPL W P-5'-P-CCNC: 12 U/L (ref 1–33)
ANION GAP SERPL CALCULATED.3IONS-SCNC: 11.4 MMOL/L (ref 5–15)
AST SERPL-CCNC: 16 U/L (ref 1–32)
BASOPHILS # BLD AUTO: 0.08 10*3/MM3 (ref 0–0.2)
BASOPHILS NFR BLD AUTO: 0.6 % (ref 0–1.5)
BILIRUB SERPL-MCNC: 0.2 MG/DL (ref 0–1.2)
BUN SERPL-MCNC: 15 MG/DL (ref 8–23)
BUN/CREAT SERPL: 20 (ref 7–25)
CALCIUM SPEC-SCNC: 10 MG/DL (ref 8.6–10.5)
CHLORIDE SERPL-SCNC: 104 MMOL/L (ref 98–107)
CHOLEST SERPL-MCNC: 151 MG/DL (ref 0–200)
CO2 SERPL-SCNC: 24.6 MMOL/L (ref 22–29)
CREAT SERPL-MCNC: 0.75 MG/DL (ref 0.57–1)
DEPRECATED RDW RBC AUTO: 46.4 FL (ref 37–54)
EOSINOPHIL # BLD AUTO: 0.35 10*3/MM3 (ref 0–0.4)
EOSINOPHIL NFR BLD AUTO: 2.6 % (ref 0.3–6.2)
ERYTHROCYTE [DISTWIDTH] IN BLOOD BY AUTOMATED COUNT: 13.1 % (ref 12.3–15.4)
GFR SERPL CREATININE-BSD FRML MDRD: 77 ML/MIN/1.73
GLOBULIN UR ELPH-MCNC: 2.6 GM/DL
GLUCOSE SERPL-MCNC: 89 MG/DL (ref 65–99)
HCT VFR BLD AUTO: 44.7 % (ref 34–46.6)
HDLC SERPL-MCNC: 53 MG/DL (ref 40–60)
HGB BLD-MCNC: 14.4 G/DL (ref 12–15.9)
IMM GRANULOCYTES # BLD AUTO: 0.05 10*3/MM3 (ref 0–0.05)
IMM GRANULOCYTES NFR BLD AUTO: 0.4 % (ref 0–0.5)
LDLC SERPL CALC-MCNC: 84 MG/DL (ref 0–100)
LDLC/HDLC SERPL: 1.59 {RATIO}
LYMPHOCYTES # BLD AUTO: 3.39 10*3/MM3 (ref 0.7–3.1)
LYMPHOCYTES NFR BLD AUTO: 25.1 % (ref 19.6–45.3)
MCH RBC QN AUTO: 31 PG (ref 26.6–33)
MCHC RBC AUTO-ENTMCNC: 32.2 G/DL (ref 31.5–35.7)
MCV RBC AUTO: 96.1 FL (ref 79–97)
MONOCYTES # BLD AUTO: 0.89 10*3/MM3 (ref 0.1–0.9)
MONOCYTES NFR BLD AUTO: 6.6 % (ref 5–12)
NEUTROPHILS NFR BLD AUTO: 64.7 % (ref 42.7–76)
NEUTROPHILS NFR BLD AUTO: 8.75 10*3/MM3 (ref 1.7–7)
NRBC BLD AUTO-RTO: 0 /100 WBC (ref 0–0.2)
PLATELET # BLD AUTO: 309 10*3/MM3 (ref 140–450)
PMV BLD AUTO: 11 FL (ref 6–12)
POTASSIUM SERPL-SCNC: 4.2 MMOL/L (ref 3.5–5.2)
PROT SERPL-MCNC: 7 G/DL (ref 6–8.5)
RBC # BLD AUTO: 4.65 10*6/MM3 (ref 3.77–5.28)
SODIUM SERPL-SCNC: 140 MMOL/L (ref 136–145)
TRIGL SERPL-MCNC: 69 MG/DL (ref 0–150)
VIT B12 BLD-MCNC: 299 PG/ML (ref 211–946)
VLDLC SERPL-MCNC: 14 MG/DL (ref 5–40)
WBC # BLD AUTO: 13.51 10*3/MM3 (ref 3.4–10.8)

## 2021-09-28 PROCEDURE — G0438 PPPS, INITIAL VISIT: HCPCS | Performed by: FAMILY MEDICINE

## 2021-09-28 PROCEDURE — 82306 VITAMIN D 25 HYDROXY: CPT

## 2021-09-28 PROCEDURE — 80061 LIPID PANEL: CPT

## 2021-09-28 PROCEDURE — 80053 COMPREHEN METABOLIC PANEL: CPT

## 2021-09-28 PROCEDURE — 82607 VITAMIN B-12: CPT

## 2021-09-28 PROCEDURE — 85025 COMPLETE CBC W/AUTO DIFF WBC: CPT

## 2021-09-28 NOTE — PATIENT INSTRUCTIONS
.    Medicare Wellness  Personal Prevention Plan of Service     Date of Office Visit:  2021  Encounter Provider:  Diego Gusman MD  Place of Service:  [facility] CARE [facility]  Patient Name: Jenn Lee  :  1955    As part of the Medicare Wellness portion of your visit today, we are providing you with this personalized preventive plan of services (PPPS). This plan is based upon recommendations of the [place] Preventive Services Task Force (USPSTF) and the Advisory Committee on Immunization Practices (ACIP).    This lists the preventive care services that should be considered, and provides dates of when you are due. Items listed as completed are up-to-date and do not require any further intervention.    Health Maintenance   Topic Date Due   • COVID-19 Vaccine (1) Never done   • TDAP/TD VACCINES (1 - Tdap) Never done   • ZOSTER VACCINE (1 of 2) Never done   • ANNUAL WELLNESS VISIT  Never done   • COLORECTAL CANCER SCREENING  2021   • INFLUENZA VACCINE  10/01/2021   • LIPID PANEL  2022   • DXA SCAN  2022   • MAMMOGRAM  2023   • HEPATITIS C SCREENING  Completed   • Pneumococcal Vaccine 65+  Completed   • PAP SMEAR  Discontinued       No orders of the defined types were placed in this encounter.      No follow-ups on file.

## 2021-09-28 NOTE — TELEPHONE ENCOUNTER
Pt let me know that she was unable to get her Breo due to the way it was written, she states the pharmacy told her that it just needed to be changed.      I called [facility], Pharmacist stated that with her insurance the Breo has to be written with the quantity of 60 instead of 30. I did give verbal order to change so pt could get this medication.     Wanted you to know for future reference.

## 2021-09-29 NOTE — TELEPHONE ENCOUNTER
Im not sure what happen, I called the pharmacy yesterday and talked to them to change the dose in order to run it through her insurance. I also give this pt 2 samples of the BREO 200/25.

## 2021-09-29 NOTE — TELEPHONE ENCOUNTER
She called in stating that she can't afford the breo . She would like to know if we have any samples.

## 2021-11-08 NOTE — PROGRESS NOTES
Chief Complaint  COPD, Migraine, Chronic Kidney Disease, Allergies, Hypertension, and Hyperlipidemia    Subjective          Jenn Lee presents to [facility] [place]  History of Present Illness      Pt is 65 yo female with management of tobacco smoker, COPD, chronic bronchitis  osteoporosis, osteaoarthritis of both hips, multiple joint pain, HLP, HTN, chronic pain, migraine headaches, sp tonsillectomy, sp hysterectomy, compression fracture of thoracic vertebra, chronic hip pain (trochanteric bursitis of left hip), leukocytosis, CKD stage 2, leukocytosis, viamin D deficiency positive cologuard test, history of cataracts, history of skin cancer       8/31/21 in office visit for recheck on pt's above medical issues. Pt had labwork done on 7/1/21 that showed on CBC WBC at 17.66 hemoglobin normal CMP showed GFR at 72 from 91. Albumin at 3.30. CRP was high at 2.30. pt did have CT  Of chest lung cancer screening that showed 17 mm ground glass opacity in right upper lung that is considered benign. One year followup  Was recommended. Her mammogram on 6/28/21 was normal. Pt continues to take her medications for HTN, migraine headaches, HLP,  COPD,  Chronic back pain, allergic rhinitis.  Her recent mammogram screening was normal. CT of chest showed benign right upper lobe lung. Lesion. She continues to smoke tobacco about 1 ppd..     11/23/21 in office visit for recheck on pt's above medical issues. Pt had labwork done on 9/28/21 that showed stable vitamin B12 and vitamin D lipid panel stable. CMP showedGFR at 77 alkaline phosphatase at 142. CBC showed WBC at 13.51 from 17.66. pt continues to take her medications for allergic rhinitis, COPD, HTn, HLP, vitamin D deficinecy, migraine headaches,  Osteoporosis.  She is doing fair. Breathing is fair and  She is using Breo but insurance would cover. Her weight has been stable. She denies fever or night sweats.  She is due for  colnoscopy screening . She continues to smoke        Shortness of Breath  This is a chronic problem. The problem occurs constantly. The problem has been unchanged. Pertinent negatives include no abdominal pain, chest pain, claudication, coryza, ear pain, fever, headaches, hemoptysis, leg pain, leg swelling, neck pain, orthopnea, PND, rash, rhinorrhea, sore throat, sputum production, swollen glands, syncope, vomiting or wheezing. Nothing aggravates the symptoms. Risk factors include smoking. She has tried beta agonist inhalers for the symptoms. The treatment provided mild relief. Her past medical history is significant for chronic lung disease and COPD. There is no history of allergies, aspirin allergies, asthma, bronchiolitis, CAD, DVT, a heart failure, PE, pneumonia or a recent surgery.   COPD  She complains of shortness of breath. There is no hemoptysis, sputum production or wheezing. This is a chronic problem. The current episode started more than 1 year ago. The problem occurs constantly. The problem has been gradually worsening. Associated symptoms include dyspnea on exertion. Pertinent negatives include no appetite change, chest pain, ear congestion, ear pain, fever, headaches, malaise/fatigue, myalgias, nasal congestion, orthopnea, PND, postnasal drip, rhinorrhea, sneezing, sore throat, sweats, trouble swallowing or weight loss. Her symptoms are aggravated by strenuous activity. Her symptoms are alleviated by beta-agonist. She reports moderate improvement on treatment. Her symptoms are not alleviated by beta-agonist. Risk factors for lung disease include smoking/tobacco exposure. Her past medical history is significant for COPD. There is no history of asthma, bronchiectasis, bronchitis, emphysema or pneumonia.   Hyperlipidemia  This is a chronic problem. The current episode started more than 1 year ago. The problem is controlled. Recent lipid tests were reviewed and are variable. She has no history of chronic  renal disease, diabetes, hypothyroidism, liver disease, obesity or nephrotic syndrome. Associated symptoms include shortness of breath. Pertinent negatives include no chest pain. Current antihyperlipidemic treatment includes statins. The current treatment provides moderate improvement of lipids. There are no compliance problems.    Chronic Kidney Disease  This is a chronic problem. The current episode started more than 1 year ago. The problem occurs constantly. The problem has been unchanged. Associated symptoms include arthralgias, coughing, fatigue, headaches, numbness and weakness. Pertinent negatives include no chest pain, chills, congestion, diaphoresis, fever, nausea, sore throat or vomiting. She has tried nothing for the symptoms. The treatment provided no relief.   Migraine    The current episode started more than 1 year ago. The problem occurs constantly. The pain is located in the bilateral region. The pain does not radiate. The pain quality is not similar to prior headaches. The quality of the pain is described as pulsating and band-like. The pain is at a severity of 4/10. Associated symptoms include back pain, coughing, numbness and weakness. Pertinent negatives include no abdominal pain, abnormal behavior, anorexia, blurred vision, dizziness, drainage, ear pain, eye pain, eye redness, eye watering, facial sweating, fever, hearing loss, insomnia, loss of balance, muscle aches, nausea, neck pain, phonophobia, photophobia, rhinorrhea, scalp tenderness, seizures, sinus pressure, sore throat, swollen glands, tingling, tinnitus, visual change, vomiting or weight loss. The symptoms are aggravated by bright light. Treatments tried: elavil  The treatment provided moderate relief. Her past medical history is significant for migraine headaches. There is no history of cancer, cluster headaches, hypertension, immunosuppression, migraines in the family, obesity, pseudotumor cerebri, recent head traumas, sinus  disease or TMJ.   Hypertension   This is a chronic problem. The current episode started more than 1 year ago. The problem is unchanged. The problem is controlled. Associated symptoms include shortness of breath. Pertinent negatives include no anxiety, blurred vision, chest pain, headaches, malaise/fatigue, neck pain, orthopnea, palpitations, PND or sweats. There are no known risk factors for coronary artery disease. Past treatments include beta blockers and calcium channel blockers. Current antihypertension treatment includes beta blockers and calcium channel blockers. The current treatment provides no improvement. There are no compliance problems.  There is no history of angina, kidney disease, CAD/MI, CVA, heart failure, left ventricular hypertrophy, PVD or retinopathy. There is no history of chronic renal disease, coarctation of the aorta, hyperaldosteronism, hypercortisolism, hyperparathyroidism, a hypertension causing med, pheochromocytoma, renovascular disease, sleep apnea or a thyroid problem.   Back Pain   This is a recurrent problem. The current episode started more than 1 year ago. The problem occurs daily. The problem has been waxing and waning since onset. The pain is present in the sacro-iliac and thoracic spine. The quality of the pain is described as aching. The pain does not radiate. The pain is at a severity of 6/10. The pain is moderate. The pain is the same all the time. The symptoms are aggravated by bending, lying down, position, sitting and standing. Stiffness is present all day. Associated symptoms include numbness. Pertinent negatives include no abdominal pain, bladder incontinence, bowel incontinence, chest pain, dysuria, fever, leg pain, paresis, paresthesias, pelvic pain, perianal numbness, tingling, weakness or weight loss. Risk factors include history of osteoporosis. The treatment provided no relief    Objective   Vital Signs:   /42 (BP Location: Left arm, Patient Position:  "Sitting, Cuff Size: Adult)   Pulse 82   Temp 98.4 °F (36.9 °C)   Ht 157.5 cm (62\")   Wt 41.9 kg (92 lb 6.4 oz)   SpO2 97%   BMI 16.90 kg/m²       Physical Exam  Vitals and nursing note reviewed.   Constitutional:       Appearance: She is well-developed. She is not diaphoretic.      Comments: Thin apperance    HENT:      Head: Normocephalic and atraumatic.      Right Ear: External ear normal.   Eyes:      Conjunctiva/sclera: Conjunctivae normal.      Pupils: Pupils are equal, round, and reactive to light.   Cardiovascular:      Rate and Rhythm: Normal rate and regular rhythm.      Heart sounds: Normal heart sounds. No murmur heard.      Pulmonary:      Effort: No respiratory distress.      Comments: Decreased breath sounds   Abdominal:      General: Bowel sounds are normal. There is no distension.      Palpations: Abdomen is soft.      Tenderness: There is no abdominal tenderness.   Musculoskeletal:         General: No deformity. Normal range of motion.      Cervical back: Normal range of motion and neck supple.   Skin:     General: Skin is warm.      Coloration: Skin is not pale.      Findings: No erythema or rash.   Neurological:      Mental Status: She is alert and oriented to person, place, and time.      Cranial Nerves: No cranial nerve deficit.   Psychiatric:         Behavior: Behavior normal.        Result Review :                 Assessment and Plan    Diagnoses and all orders for this visit:    1. Chronic obstructive pulmonary disease, unspecified COPD type (HCC) (Primary)  -     CBC Auto Differential; Future  -     Comprehensive Metabolic Panel; Future  -     Lipid Panel; Future  -     Vitamin D 25 Hydroxy; Future  -     Vitamin B12; Future    2. Tobacco user  -     CBC Auto Differential; Future  -     Comprehensive Metabolic Panel; Future  -     Lipid Panel; Future  -     Vitamin D 25 Hydroxy; Future  -     Vitamin B12; Future    3. Underweight  -     CBC Auto Differential; Future  -     " Comprehensive Metabolic Panel; Future  -     Lipid Panel; Future  -     Vitamin D 25 Hydroxy; Future  -     Vitamin B12; Future    4. Unspecified severe protein-calorie malnutrition (HCC)  -     CBC Auto Differential; Future  -     Comprehensive Metabolic Panel; Future  -     Lipid Panel; Future  -     Vitamin D 25 Hydroxy; Future  -     Vitamin B12; Future    5. Osteoporosis, unspecified osteoporosis type, unspecified pathological fracture presence  -     CBC Auto Differential; Future  -     Comprehensive Metabolic Panel; Future  -     Lipid Panel; Future  -     Vitamin D 25 Hydroxy; Future  -     Vitamin B12; Future    6. Mixed hyperlipidemia  -     CBC Auto Differential; Future  -     Comprehensive Metabolic Panel; Future  -     Lipid Panel; Future  -     Vitamin D 25 Hydroxy; Future  -     Vitamin B12; Future    7. Essential hypertension  -     CBC Auto Differential; Future  -     Comprehensive Metabolic Panel; Future  -     Lipid Panel; Future  -     Vitamin D 25 Hydroxy; Future  -     Vitamin B12; Future    8. Need for immunization against influenza  -     Fluzone High-Dose 65+yrs ([id number])  -     CBC Auto Differential; Future  -     Comprehensive Metabolic Panel; Future  -     Lipid Panel; Future  -     Vitamin D 25 Hydroxy; Future  -     Vitamin B12; Future    9. Leukocytosis, unspecified type  -     Ambulatory Referral to Hematology  -     CBC Auto Differential; Future  -     Comprehensive Metabolic Panel; Future  -     Lipid Panel; Future  -     Vitamin D 25 Hydroxy; Future  -     Vitamin B12; Future    10. Encounter for screening colonoscopy  -     Ambulatory Referral to Gastroenterology  -     CBC Auto Differential; Future  -     Comprehensive Metabolic Panel; Future  -     Lipid Panel; Future  -     Vitamin D 25 Hydroxy; Future  -     Vitamin B12; Future    11. Stage 2 chronic kidney disease    Other orders  -     fluticasone-salmeterol (Advair HFA) 115-21 MCG/ACT inhaler; Inhale 2 puffs 2 (Two) Times  a Day.  Dispense: 12 g; Refill: 3         -recommend labwork   -recommend colonoscopy  - will refer back to Dr. Cr with Gastroenterology   -recommend COVID-19 vaccination   -recommend influenza vaccination   - given today   -recommend tdap and shingles/pneumonia  vaccination - pneumonia 23 vaccination today   -leukocytosis - recommend Hematology referral. Will refer to Dr. Madera   -COPD/COPD exacerbation/ dyspnea - reviewed [facility] discharged summary, recommend PFTs and referral to Pulmonology. On albuterol PRN. Recommend albuterol nebulizer, smoking cessation. . She will hold referral to Pulmonology for now.. on Breo will  Go up from 100 to 200 mcg daily. Samples provided today. Once finished start Advair 2 puffs BID   -migraine headaches - on elavil 50 mg at bedtime on propranolol 40 mg gdaily.   -underweight/moderate protein malnutrition - recommend high calorie diet. BMI at 16.61. recommend high calorie diet, recommend smoking cesation   -CKD stage 2 - gave information. Continue to monitor   -allergic rhinitis - on flonase nasal spray   -HTN -  controlled on norvasc 10 mg daily.   propranolol 50 mg PO q daily.   -HLP on simvastatin 20 mg Po qhs   -old compression fracture of thoracic spine - referred to Spine Surgeon but surgery not indicated   on elavil 50 mg PO qhs  Recommend  Pain management but pt declined. Try tylenol 3 with codeine. Gave 30 pills   - osteoporosis - pt already on citracal with Vitamin D.r ecommend prolia but pt declined. Was on fosamax but has been on this for years. prolia injection declined by insurance. Recommend bone clinic but pt declined   - tobacco user advised pt to quit smoking >5 minutes.  Pt declines nicotine patches or chantix. Recommend 1 800 QUIT NOW  -advised pt to be safe and call with questions and concerns  -advised pt to go to ER or call 911 if symptoms worrisome or severe  -advised pt to followup with specialist and referrals   -advised pt to be safe during COVID-19  pandemic   I spent 30 minutes caring for Jenn on this date of service. This time includes time spent by me in the following activities: preparing for the visit, reviewing tests, obtaining and/or reviewing a separately obtained history, performing a medically appropriate examination and/or evaluation, counseling and educating the patient/family/caregiver, ordering medications, tests, or procedures, referring and communicating with other health care professionals, documenting information in the medical record, independently interpreting results and communicating that information with the patient/family/caregiver and care coordination.   -recheck in  3 months         This document has been electronically signed by Diego Gusman MD on November 23, 2021 09:22 CST        Follow Up   Return in about 1 month (around 12/23/2021).  Patient was given instructions and counseling regarding her condition or for health maintenance advice. Please see specific information pulled into the AVS if appropriate.

## 2021-11-23 ENCOUNTER — OFFICE VISIT (OUTPATIENT)
Dept: FAMILY MEDICINE CLINIC | Facility: CLINIC | Age: 66
End: 2021-11-23

## 2021-11-23 VITALS
BODY MASS INDEX: 17 KG/M2 | DIASTOLIC BLOOD PRESSURE: 42 MMHG | SYSTOLIC BLOOD PRESSURE: 106 MMHG | OXYGEN SATURATION: 97 % | WEIGHT: 92.4 LBS | HEART RATE: 82 BPM | HEIGHT: 62 IN | TEMPERATURE: 98.4 F

## 2021-11-23 DIAGNOSIS — Z72.0 TOBACCO USER: ICD-10-CM

## 2021-11-23 DIAGNOSIS — M81.0 OSTEOPOROSIS, UNSPECIFIED OSTEOPOROSIS TYPE, UNSPECIFIED PATHOLOGICAL FRACTURE PRESENCE: ICD-10-CM

## 2021-11-23 DIAGNOSIS — Z23 NEED FOR IMMUNIZATION AGAINST INFLUENZA: ICD-10-CM

## 2021-11-23 DIAGNOSIS — I10 ESSENTIAL HYPERTENSION: ICD-10-CM

## 2021-11-23 DIAGNOSIS — E43 UNSPECIFIED SEVERE PROTEIN-CALORIE MALNUTRITION (HCC): ICD-10-CM

## 2021-11-23 DIAGNOSIS — N18.2 STAGE 2 CHRONIC KIDNEY DISEASE: ICD-10-CM

## 2021-11-23 DIAGNOSIS — Z12.11 ENCOUNTER FOR SCREENING COLONOSCOPY: ICD-10-CM

## 2021-11-23 DIAGNOSIS — J44.9 CHRONIC OBSTRUCTIVE PULMONARY DISEASE, UNSPECIFIED COPD TYPE (HCC): Primary | ICD-10-CM

## 2021-11-23 DIAGNOSIS — D72.829 LEUKOCYTOSIS, UNSPECIFIED TYPE: ICD-10-CM

## 2021-11-23 DIAGNOSIS — E78.2 MIXED HYPERLIPIDEMIA: ICD-10-CM

## 2021-11-23 DIAGNOSIS — R63.6 UNDERWEIGHT: ICD-10-CM

## 2021-11-23 PROCEDURE — 99214 OFFICE O/P EST MOD 30 MIN: CPT | Performed by: FAMILY MEDICINE

## 2021-11-23 PROCEDURE — 90662 IIV NO PRSV INCREASED AG IM: CPT | Performed by: FAMILY MEDICINE

## 2021-11-23 PROCEDURE — G0008 ADMIN INFLUENZA VIRUS VAC: HCPCS | Performed by: FAMILY MEDICINE

## 2021-11-23 RX ORDER — FLUTICASONE PROPIONATE AND SALMETEROL XINAFOATE 115; 21 UG/1; UG/1
2 AEROSOL, METERED RESPIRATORY (INHALATION)
Qty: 12 G | Refills: 3 | Status: SHIPPED | OUTPATIENT
Start: 2021-11-23 | End: 2022-05-26

## 2021-11-23 NOTE — PATIENT INSTRUCTIONS
Fluticasone; Salmeterol inhalation powder  What is this medicine?  FLUTICASONE; SALMETEROL (floo TIK a sone; sal ME te role) inhalation is a combination of two medicines that decrease inflammation and help to open up the airways of your lungs. It is used to treat COPD. This medicine is also used to treat asthma. Do NOT use for an acute asthma attack. Do NOT use for a COPD attack.  This medicine may be used for other purposes; ask your health care provider or pharmacist if you have questions.  COMMON BRAND NAME(S): Advair, AirDuo Digihaler, Airduo RespiClick  What should I tell my health care provider before I take this medicine?  They need to know if you have any of these conditions:  · bone problems  · diabetes  · eye disease, vision problems  · immune system problems  · heart disease or irregular heartbeat  · high blood pressure  · infection  · pheochromocytoma  · seizures  · thyroid disease  · worsening asthma  · an unusual or allergic reaction to fluticasone; salmeterol, other corticosteroids, other medicines, foods, dyes, or preservatives  · pregnant or trying to get pregnant  · breast-feeding  How should I use this medicine?  This medicine is inhaled through the mouth. Rinse your mouth with water after use. Make sure not to swallow the water. Follow the directions on the prescription label. Do not use a spacer device with this inhaler. Take your medicine at regular intervals. Do not take your medicine more often than directed. Do not stop taking except on your doctor's advice. Make sure that you are using your inhaler correctly. Ask you doctor or health care provider if you have any questions.  A special MedGuide will be given to you by the pharmacist with each prescription and refill. Be sure to read this information carefully each time.  Talk to your pediatrician regarding the use of this medicine in children. Special care may be needed.  Overdosage: If you think you have taken too much of this medicine  contact a poison control center or emergency room at once.  NOTE: This medicine is only for you. Do not share this medicine with others.  What if I miss a dose?  If you miss a dose, use it as soon as you remember. If it is almost time for your next dose, use only that dose and continue with your regular schedule, spacing doses evenly. Do not use double or extra doses.  What may interact with this medicine?  Do not take this medicine with any of the following medications:  · MAOIs like Carbex, Eldepryl, Marplan, Nardil, and Parnate  This medicine may also interact with the following medications:  · aminophylline or theophylline  · antiviral medicines for HIV or AIDS  · beta-blockers like metoprolol and propranolol  · certain antibiotics like clarithromycin, erythromycin, levofloxacin, linezolid, and telithromycin  · certain medicines for fungal infections like ketoconazole, itraconazole, posaconazole, voriconazole  · conivaptan  · diuretics  · medicines for colds  · medicines for depression or emotional conditions  · nefazodone  · vaccines  This list may not describe all possible interactions. Give your health care provider a list of all the medicines, herbs, non-prescription drugs, or dietary supplements you use. Also tell them if you smoke, drink alcohol, or use illegal drugs. Some items may interact with your medicine.  What should I watch for while using this medicine?  Visit your doctor for regular check ups. Tell your doctor or health care professional if your symptoms do not get better. Do not use this medicine more than every 12 hours.  NEVER use this medicine for an acute asthma attack. You should use your short-acting rescue inhalers for this purpose. If your symptoms get worse or if you need your short-acting inhalers more often, call your doctor right away.  If you are going to have surgery tell your doctor or health care professional that you are using this medicine. Try not to come in contact with  people with the chicken pox or measles. If you do, call your doctor.  This medicine may increase blood sugar. Ask your healthcare provider if changes in diet or medicines are needed if you have diabetes.  What side effects may I notice from receiving this medicine?  Side effects that you should report to your doctor or health care professional as soon as possible:  · allergic reactions like skin rash or hives, swelling of the face, lips, or tongue  · breathing problems right after inhaling your medicine  · chest pain  · fast, irregular heartbeat  · feeling faint or lightheaded, falls  · fever or chills  · nausea, vomiting  · signs and symptoms of high blood sugar such as being more thirsty or hungry or having to urinate more than normal. You may also feel very tired or have blurry vision.  Side effects that usually do not require medical attention (report to your doctor or health care professional if they continue or are bothersome):  · cough  · headache  · nervousness  · sore throat  · stuffy nose  · tremor  This list may not describe all possible side effects. Call your doctor for medical advice about side effects. You may report side effects to FDA at 8-020-FDA-2385.  Where should I keep my medicine?  Keep out of the reach of children.  Advair: Store at room temperature between 68 and 77 degrees F (20 and 25 degrees C). Do not leave your medicine in the heat or sun. Throw away 1 month after you open the package or whenever the dose indicator reads 0, whichever comes first. Throw away unopened packages after the expiration date.  Airduo Respiclick: Store at room temperature between 59 and 86 degrees F (15 and 30 degrees C). Avoid exposure to extreme heat, cold, or humidity. Throw away 1 month after you open the package or whenever the dose indicator reads 0, whichever comes first. Throw away unopened packages after the expiration date.  NOTE: This sheet is a summary. It may not cover all possible information. If  you have questions about this medicine, talk to your doctor, pharmacist, or health care provider.  © 2021 [employer] (2019-09-18 11:26:46)

## 2021-11-23 NOTE — PROGRESS NOTES
Injection  Injection performed in Left Deltoid by Horace Capps MA. Patient tolerated the procedure well without complications.  11/23/21   Horace Capps MA

## 2022-03-01 RX ORDER — AMITRIPTYLINE HYDROCHLORIDE 25 MG/1
TABLET, FILM COATED ORAL
Qty: 180 TABLET | Refills: 0 | Status: SHIPPED | OUTPATIENT
Start: 2022-03-01 | End: 2022-06-06

## 2022-04-05 RX ORDER — FLUTICASONE PROPIONATE 50 MCG
SPRAY, SUSPENSION (ML) NASAL
Qty: 16 G | Refills: 3 | Status: SHIPPED | OUTPATIENT
Start: 2022-04-05 | End: 2022-08-23

## 2022-04-21 ENCOUNTER — TELEPHONE (OUTPATIENT)
Dept: FAMILY MEDICINE CLINIC | Facility: CLINIC | Age: 67
End: 2022-04-21

## 2022-04-21 NOTE — TELEPHONE ENCOUNTER
Patient called asking about some samples for the Breo inhaler . Ayaan gave me to samples. Spoke to patient to let her know that we had two. Said she would come get them after lunch .

## 2022-05-18 NOTE — PROGRESS NOTES
Subjective:  Jenn Lee is a 67 y.o. female who presents for       Patient Active Problem List   Diagnosis   • Lower abdominal pain   • Closed compression fracture of thoracic vertebra (HCC)   • Osteoporosis   • Migraine   • General medical examination   • Chronic thoracic back pain   • Tobacco user   • Acute hip pain, left   • Osteoarthritis of both hips   • Lumbar spine pain   • Trochanteric bursitis of left hip   • Annual physical exam   • Tobacco abuse counseling   • Chronic low back pain   • Essential hypertension   • Hyperlipidemia   • Chronic bilateral thoracic back pain   • Severe protein-calorie malnutrition (HCC)   • Lymphocytosis   • Stage 2 chronic kidney disease   • Allergic rhinitis   • Unspecified severe protein-calorie malnutrition (HCC)   • Underweight   • Chronic bronchitis (HCC)   • COPD with exacerbation (HCC)   • Chronic midline thoracic back pain   • Age related osteoporosis   • Chronic obstructive pulmonary disease (HCC)           Current Outpatient Medications:   •  acetaminophen-codeine (TYLENOL #3) 300-30 MG per tablet, Take 1 tablet by mouth Every 4 (Four) Hours As Needed for Moderate Pain ., Disp: 30 tablet, Rfl: 0  •  albuterol sulfate  (90 Base) MCG/ACT inhaler, Inhale 2 puffs Every 4 (Four) Hours As Needed for Wheezing., Disp: 18 g, Rfl: 3  •  amitriptyline (ELAVIL) 25 MG tablet, TAKE TWO TABLETS BY MOUTH EVERY NIGHT, Disp: 180 tablet, Rfl: 0  •  amLODIPine (Norvasc) 10 MG tablet, Take 1 tablet by mouth Daily., Disp: 90 tablet, Rfl: 3  •  calcium-vitamin D 250-100 MG-UNIT per tablet, Take 1 tablet by mouth 2 (Two) Times a Day., Disp: 60 tablet, Rfl: 3  •  fluticasone (FLONASE) 50 MCG/ACT nasal spray, INSTILL 2 SPRAYS IN EACH NOSTRIL ONCE DAILY AS DIRECTED BY PROVIDER, Disp: 16 g, Rfl: 3  •  fluticasone-salmeterol (Advair HFA) 115-21 MCG/ACT inhaler, Inhale 2 puffs 2 (Two) Times a Day., Disp: 12 g, Rfl: 3  •  montelukast (SINGULAIR) 10 MG tablet, Take 1 tablet by mouth  every night at bedtime., Disp: 90 tablet, Rfl: 3  •  Multiple Minerals-Vitamins (Citracal Plus) tablet, Take 1 tablet by mouth Daily., Disp: 30 each, Rfl: 3  •  Multiple Minerals-Vitamins (Citracal Plus) tablet, Take 1 tablet by mouth Daily., Disp: 30 each, Rfl: 3  •  propranolol (INDERAL) 40 MG tablet, Take 1 tablet by mouth Daily., Disp: 90 tablet, Rfl: 3  •  simvastatin (Zocor) 20 MG tablet, Take 1 tablet by mouth Every Night., Disp: 90 tablet, Rfl: 3  •  Turmeric 500 MG capsule, Take 500 mg by mouth Daily., Disp: 30 capsule, Rfl: 3  •  vitamin D (ERGOCALCIFEROL) 1.25 MG (33656 UT) capsule capsule, TAKE ONE CAPSULE BY MOUTH ONCE A WEEK, Disp: 5 capsule, Rfl: 3    Pt is 66 yo female with management of tobacco smoker, COPD, chronic bronchitis  osteoporosis, osteaoarthritis of both hips, multiple joint pain, HLP, HTN, chronic pain, migraine headaches, sp tonsillectomy, sp hysterectomy, compression fracture of thoracic vertebra, chronic hip pain (trochanteric bursitis of left hip), leukocytosis, CKD stage 2, leukocytosis, viamin D deficiency positive cologuard test, history of cataracts, history of skin cancer         11/23/21 in office visit for recheck on pt's above medical issues. Pt had labwork done on 9/28/21 that showed stable vitamin B12 and vitamin D lipid panel stable. CMP showedGFR at 77 alkaline phosphatase at 142. CBC showed WBC at 13.51 from 17.66. pt continues to take her medications for allergic rhinitis, COPD, HTn, HLP, vitamin D deficinecy, migraine headaches,  Osteoporosis.  She is doing fair. Breathing is fair and  She is using Breo but insurance would cover. Her weight has been stable. She denies fever or night sweats.  She is due for colnoscopy screening . She continues to smoke     5/26/22 in office visit for recheck.  Pt states she has been ill for past few weeks. She is coughing up brownish sputum. Continues to smoke 1/2 ppd. Her breathing has been labored. She is due for new labwork and DEXA  scan. Her HEmatology and Gastro appt has been canceled and she will have to reschedule. She did not get her COVID vaccination yet       URI   This is a new problem. The current episode started 1 to 4 weeks ago. The problem has been gradually worsening. There has been no fever. Associated symptoms include coughing. Pertinent negatives include no abdominal pain, chest pain, congestion, diarrhea, dysuria, ear pain, headaches, joint pain, joint swelling, nausea, neck pain, plugged ear sensation, rash, rhinorrhea, sinus pain, sneezing, sore throat, swollen glands, vomiting or wheezing. She has tried nothing for the symptoms. The treatment provided no relief.    Shortness of Breath  This is a chronic problem. The problem occurs constantly. The problem has been unchanged. Pertinent negatives include no abdominal pain, chest pain, claudication, coryza, ear pain, fever, headaches, hemoptysis, leg pain, leg swelling, neck pain, orthopnea, PND, rash, rhinorrhea, sore throat, sputum production, swollen glands, syncope, vomiting or wheezing. Nothing aggravates the symptoms. Risk factors include smoking. She has tried beta agonist inhalers for the symptoms. The treatment provided mild relief. Her past medical history is significant for chronic lung disease and COPD. There is no history of allergies, aspirin allergies, asthma, bronchiolitis, CAD, DVT, a heart failure, PE, pneumonia or a recent surgery.   COPD  She complains of shortness of breath. There is no hemoptysis, sputum production or wheezing. This is a chronic problem. The current episode started more than 1 year ago. The problem occurs constantly. The problem has been gradually worsening. Associated symptoms include dyspnea on exertion. Pertinent negatives include no appetite change, chest pain, ear congestion, ear pain, fever, headaches, malaise/fatigue, myalgias, nasal congestion, orthopnea, PND, postnasal drip, rhinorrhea, sneezing, sore throat, sweats, trouble  swallowing or weight loss. Her symptoms are aggravated by strenuous activity. Her symptoms are alleviated by beta-agonist. She reports moderate improvement on treatment. Her symptoms are not alleviated by beta-agonist. Risk factors for lung disease include smoking/tobacco exposure. Her past medical history is significant for COPD. There is no history of asthma, bronchiectasis, bronchitis, emphysema or pneumonia.   Hyperlipidemia  This is a chronic problem. The current episode started more than 1 year ago. The problem is controlled. Recent lipid tests were reviewed and are variable. She has no history of chronic renal disease, diabetes, hypothyroidism, liver disease, obesity or nephrotic syndrome. Associated symptoms include shortness of breath. Pertinent negatives include no chest pain. Current antihyperlipidemic treatment includes statins. The current treatment provides moderate improvement of lipids. There are no compliance problems.    Chronic Kidney Disease  This is a chronic problem. The current episode started more than 1 year ago. The problem occurs constantly. The problem has been unchanged. Associated symptoms include arthralgias, coughing, fatigue, headaches, numbness and weakness. Pertinent negatives include no chest pain, chills, congestion, diaphoresis, fever, nausea, sore throat or vomiting. She has tried nothing for the symptoms. The treatment provided no relief.   Migraine    The current episode started more than 1 year ago. The problem occurs constantly. The pain is located in the bilateral region. The pain does not radiate. The pain quality is not similar to prior headaches. The quality of the pain is described as pulsating and band-like. The pain is at a severity of 4/10. Associated symptoms include back pain, coughing, numbness and weakness. Pertinent negatives include no abdominal pain, abnormal behavior, anorexia, blurred vision, dizziness, drainage, ear pain, eye pain, eye redness, eye  watering, facial sweating, fever, hearing loss, insomnia, loss of balance, muscle aches, nausea, neck pain, phonophobia, photophobia, rhinorrhea, scalp tenderness, seizures, sinus pressure, sore throat, swollen glands, tingling, tinnitus, visual change, vomiting or weight loss. The symptoms are aggravated by bright light. Treatments tried: elavil  The treatment provided moderate relief. Her past medical history is significant for migraine headaches. There is no history of cancer, cluster headaches, hypertension, immunosuppression, migraines in the family, obesity, pseudotumor cerebri, recent head traumas, sinus disease or TMJ.   Hypertension   This is a chronic problem. The current episode started more than 1 year ago. The problem is unchanged. The problem is controlled. Associated symptoms include shortness of breath. Pertinent negatives include no anxiety, blurred vision, chest pain, headaches, malaise/fatigue, neck pain, orthopnea, palpitations, PND or sweats. There are no known risk factors for coronary artery disease. Past treatments include beta blockers and calcium channel blockers. Current antihypertension treatment includes beta blockers and calcium channel blockers. The current treatment provides no improvement. There are no compliance problems.  There is no history of angina, kidney disease, CAD/MI, CVA, heart failure, left ventricular hypertrophy, PVD or retinopathy. There is no history of chronic renal disease, coarctation of the aorta, hyperaldosteronism, hypercortisolism, hyperparathyroidism, a hypertension causing med, pheochromocytoma, renovascular disease, sleep apnea or a thyroid problem.   Back Pain   This is a recurrent problem. The current episode started more than 1 year ago. The problem occurs daily. The problem has been waxing and waning since onset. The pain is present in the sacro-iliac and thoracic spine. The quality of the pain is described as aching. The pain does not radiate. The pain  is at a severity of 6/10. The pain is moderate. The pain is the same all the time. The symptoms are aggravated by bending, lying down, position, sitting and standing. Stiffness is present all day. Associated symptoms include numbness. Pertinent negatives include no abdominal pain, bladder incontinence, bowel incontinence, chest pain, dysuria, fever, leg pain, paresis, paresthesias, pelvic pain, perianal numbness, tingling, weakness or weight loss. Risk factors include history of osteoporosis. The treatment provided no relief       Review of Systems  Review of Systems   Constitutional: Positive for activity change and fatigue. Negative for appetite change, chills, diaphoresis and fever.   HENT: Negative for congestion, ear pain, postnasal drip, rhinorrhea, sinus pressure, sinus pain, sneezing, sore throat, trouble swallowing and voice change.    Respiratory: Positive for cough and shortness of breath. Negative for choking, chest tightness, wheezing and stridor.    Cardiovascular: Negative for chest pain.   Gastrointestinal: Negative for abdominal pain, diarrhea, nausea and vomiting.   Genitourinary: Negative for dysuria.   Musculoskeletal: Positive for arthralgias and back pain. Negative for joint pain and neck pain.   Skin: Negative for rash.   Neurological: Positive for weakness and numbness. Negative for headaches.       Patient Active Problem List   Diagnosis   • Lower abdominal pain   • Closed compression fracture of thoracic vertebra (HCC)   • Osteoporosis   • Migraine   • General medical examination   • Chronic thoracic back pain   • Tobacco user   • Acute hip pain, left   • Osteoarthritis of both hips   • Lumbar spine pain   • Trochanteric bursitis of left hip   • Annual physical exam   • Tobacco abuse counseling   • Chronic low back pain   • Essential hypertension   • Hyperlipidemia   • Chronic bilateral thoracic back pain   • Severe protein-calorie malnutrition (HCC)   • Lymphocytosis   • Stage 2 chronic  kidney disease   • Allergic rhinitis   • Unspecified severe protein-calorie malnutrition (HCC)   • Underweight   • Chronic bronchitis (HCC)   • COPD with exacerbation (HCC)   • Chronic midline thoracic back pain   • Age related osteoporosis   • Chronic obstructive pulmonary disease (HCC)     Past Surgical History:   Procedure Laterality Date   • HYSTERECTOMY     • TONSILLECTOMY       Social History     Socioeconomic History   • Marital status:    Tobacco Use   • Smoking status: Current Every Day Smoker   • Smokeless tobacco: Never Used   Substance and Sexual Activity   • Alcohol use: Yes     Comment: social   • Drug use: No   • Sexual activity: Defer     Family History   Problem Relation Age of Onset   • Alcohol abuse Other    • Asthma Other    • Breast cancer Other    • Diabetes Other    • Glaucoma Other    • Heart disease Other    • Hypertension Other    • Stroke Other      No visits with results within 6 Month(s) from this visit.   Latest known visit with results is:   Lab on 09/28/2021   Component Date Value Ref Range Status   • WBC 09/28/2021 13.51 (A) 3.40 - 10.80 10*3/mm3 Final   • RBC 09/28/2021 4.65  3.77 - 5.28 10*6/mm3 Final   • Hemoglobin 09/28/2021 14.4  12.0 - 15.9 g/dL Final   • Hematocrit 09/28/2021 44.7  34.0 - 46.6 % Final   • MCV 09/28/2021 96.1  79.0 - 97.0 fL Final   • MCH 09/28/2021 31.0  26.6 - 33.0 pg Final   • MCHC 09/28/2021 32.2  31.5 - 35.7 g/dL Final   • RDW 09/28/2021 13.1  12.3 - 15.4 % Final   • RDW-SD 09/28/2021 46.4  37.0 - 54.0 fl Final   • MPV 09/28/2021 11.0  6.0 - 12.0 fL Final   • Platelets 09/28/2021 309  140 - 450 10*3/mm3 Final   • Neutrophil % 09/28/2021 64.7  42.7 - 76.0 % Final   • Lymphocyte % 09/28/2021 25.1  19.6 - 45.3 % Final   • Monocyte % 09/28/2021 6.6  5.0 - 12.0 % Final   • Eosinophil % 09/28/2021 2.6  0.3 - 6.2 % Final   • Basophil % 09/28/2021 0.6  0.0 - 1.5 % Final   • Immature Grans % 09/28/2021 0.4  0.0 - 0.5 % Final   • Neutrophils, Absolute  09/28/2021 8.75 (A) 1.70 - 7.00 10*3/mm3 Final   • Lymphocytes, Absolute 09/28/2021 3.39 (A) 0.70 - 3.10 10*3/mm3 Final   • Monocytes, Absolute 09/28/2021 0.89  0.10 - 0.90 10*3/mm3 Final   • Eosinophils, Absolute 09/28/2021 0.35  0.00 - 0.40 10*3/mm3 Final   • Basophils, Absolute 09/28/2021 0.08  0.00 - 0.20 10*3/mm3 Final   • Immature Grans, Absolute 09/28/2021 0.05  0.00 - 0.05 10*3/mm3 Final   • nRBC 09/28/2021 0.0  0.0 - 0.2 /100 WBC Final   • Glucose 09/28/2021 89  65 - 99 mg/dL Final   • BUN 09/28/2021 15  8 - 23 mg/dL Final   • Creatinine 09/28/2021 0.75  0.57 - 1.00 mg/dL Final   • Sodium 09/28/2021 140  136 - 145 mmol/L Final   • Potassium 09/28/2021 4.2  3.5 - 5.2 mmol/L Final   • Chloride 09/28/2021 104  98 - 107 mmol/L Final   • CO2 09/28/2021 24.6  22.0 - 29.0 mmol/L Final   • Calcium 09/28/2021 10.0  8.6 - 10.5 mg/dL Final   • Total Protein 09/28/2021 7.0  6.0 - 8.5 g/dL Final   • Albumin 09/28/2021 4.40  3.50 - 5.20 g/dL Final   • ALT (SGPT) 09/28/2021 12  1 - 33 U/L Final   • AST (SGOT) 09/28/2021 16  1 - 32 U/L Final   • Alkaline Phosphatase 09/28/2021 142 (A) 39 - 117 U/L Final   • Total Bilirubin 09/28/2021 0.2  0.0 - 1.2 mg/dL Final   • eGFR Non  Amer 09/28/2021 77  >60 mL/min/1.73 Final   • Globulin 09/28/2021 2.6  gm/dL Final   • A/G Ratio 09/28/2021 1.7  g/dL Final   • BUN/Creatinine Ratio 09/28/2021 20.0  7.0 - 25.0 Final   • Anion Gap 09/28/2021 11.4  5.0 - 15.0 mmol/L Final   • Total Cholesterol 09/28/2021 151  0 - 200 mg/dL Final   • Triglycerides 09/28/2021 69  0 - 150 mg/dL Final   • HDL Cholesterol 09/28/2021 53  40 - 60 mg/dL Final   • LDL Cholesterol  09/28/2021 84  0 - 100 mg/dL Final   • VLDL Cholesterol 09/28/2021 14  5 - 40 mg/dL Final   • LDL/HDL Ratio 09/28/2021 1.59   Final   • 25 Hydroxy, Vitamin D 09/28/2021 41.6  30.0 - 100.0 ng/ml Final   • Vitamin B-12 09/28/2021 299  211 - 946 pg/mL Final      XR Hip With or Without Pelvis 2 - 3 View Left  AP of the pelvis with 2  "views of the left hip reveals no fracture   dislocation or other acute radiological abnormality noted when compared to   previous films obtained earlier this year.  12/18/18 at 12:33 PM by Ezequiel King, RAJESH    [unfilled]  Immunization History   Administered Date(s) Administered   • Fluad Quad 65+ 10/14/2020   • Fluzone High-Dose 65+yrs 11/23/2021   • Pneumococcal Polysaccharide (PPSV23) 08/31/2021       The following portions of the patient's history were reviewed and updated as appropriate: allergies, current medications, past family history, past medical history, past social history, past surgical history and problem list.        Physical Exam  /74   Pulse 83   Temp 98.3 °F (36.8 °C) (Oral)   Ht 157.5 cm (62\")   Wt 42.4 kg (93 lb 6.4 oz)   SpO2 95%   BMI 17.08 kg/m²     Physical Exam  Vitals and nursing note reviewed.   Constitutional:       General: She is in acute distress.      Appearance: She is well-developed. She is ill-appearing. She is not diaphoretic.   HENT:      Head: Normocephalic and atraumatic.      Right Ear: External ear normal.   Eyes:      Conjunctiva/sclera: Conjunctivae normal.      Pupils: Pupils are equal, round, and reactive to light.   Cardiovascular:      Rate and Rhythm: Normal rate and regular rhythm.      Heart sounds: Normal heart sounds. No murmur heard.  Pulmonary:      Effort: Pulmonary effort is normal. No respiratory distress.      Comments: Decreased breath sounds   Abdominal:      General: Bowel sounds are normal. There is no distension.      Palpations: Abdomen is soft.      Tenderness: There is no abdominal tenderness.   Musculoskeletal:         General: Tenderness present. No deformity. Normal range of motion.      Cervical back: Normal range of motion and neck supple.   Skin:     General: Skin is warm.      Coloration: Skin is not pale.      Findings: No erythema or rash.   Neurological:      Mental Status: She is alert and oriented to person, place, and " time.      Cranial Nerves: No cranial nerve deficit.   Psychiatric:         Behavior: Behavior normal.         [unfilled]   Diagnosis Plan   1. Essential hypertension     2. Mixed hyperlipidemia     3. Unspecified severe protein-calorie malnutrition (HCC)     4. Underweight     5. Stage 2 chronic kidney disease     6. Age related osteoporosis, unspecified pathological fracture presence     7. Chronic obstructive pulmonary disease, unspecified COPD type (HCC)     8. Tobacco user     9. Tobacco abuse counseling             -recommend labwork   -recommend colonoscopy  - referred back to Dr. Cr with Gastroenterology   -recommend COVID-19 vaccination   -recommend tdap and shingles/pneumonia  vaccination - pneumonia 23 vaccination today   -URI - chest x-ray today. Albuterol nebs. doxcycline 100 mg pO BID for 7 days. Prednisone tapering dose. Advised to quit smoking   -leukocytosis - recommend Hematology referral. Referred  to Dr. Madera   -COPD/COPD exacerbation/ dyspnea - reviewed [facility] discharged summary, recommend PFTs and referral to Pulmonology. On albuterol PRN. Recommend albuterol nebulizer, smoking cessation. . She will hold referral to Pulmonology for now.. on Breo will  Go up from 100 to 200 mcg daily. Samples provided today. Once finished start Advair 2 puffs BID   -migraine headaches - on elavil 50 mg at bedtime on propranolol 40 mg gdaily.   -underweight/moderate protein malnutrition - recommend high calorie diet. BMI at 16.61. recommend high calorie diet, recommend smoking cesation   -CKD stage 2 - gave information. Continue to monitor   -allergic rhinitis - on flonase nasal spray   -HTN -  controlled on norvasc 10 mg daily.   propranolol 50 mg PO q daily.   -HLP on simvastatin 20 mg Po qhs   -old compression fracture of thoracic spine - referred to Spine Surgeon but surgery not indicated   on elavil 50 mg PO qhs  Recommend  Pain management but pt declined. Try tylenol 3 with codeine. Gave 30  pills   - osteoporosis - pt already on citracal with Vitamin D.r ecommend prolia but pt declined.  Recommend repeat DEXA scan  Was on fosamax but has been on this for years. prolia injection declined by insurance. Recommend bone clinic but pt declined   - tobacco user advised pt to quit smoking >5 minutes.  Pt declines nicotine patches or chantix. Recommend 1 800 QUIT NOW  -advised pt to be safe and call with questions and concerns  -advised pt to go to ER or call 911 if symptoms worrisome or severe  -advised pt to followup with specialist and referrals   -advised pt to be safe during COVID-19 pandemic   I spent 30 minutes caring for Jenn on this date of service. This time includes time spent by me in the following activities: preparing for the visit, reviewing tests, obtaining and/or reviewing a separately obtained history, performing a medically appropriate examination and/or evaluation, counseling and educating the patient/family/caregiver, ordering medications, tests, or procedures, referring and communicating with other health care professionals, documenting information in the medical record, independently interpreting results and communicating that information with the patient/family/caregiver and care coordination.   \      This document has been electronically signed by Diego Gusman MD on May 26, 2022 08:00 CDT

## 2022-05-26 ENCOUNTER — LAB (OUTPATIENT)
Dept: LAB | Facility: HOSPITAL | Age: 67
End: 2022-05-26

## 2022-05-26 ENCOUNTER — OFFICE VISIT (OUTPATIENT)
Dept: FAMILY MEDICINE CLINIC | Facility: CLINIC | Age: 67
End: 2022-05-26

## 2022-05-26 VITALS
WEIGHT: 93.4 LBS | TEMPERATURE: 98.3 F | HEART RATE: 83 BPM | OXYGEN SATURATION: 95 % | DIASTOLIC BLOOD PRESSURE: 74 MMHG | BODY MASS INDEX: 17.19 KG/M2 | HEIGHT: 62 IN | SYSTOLIC BLOOD PRESSURE: 122 MMHG

## 2022-05-26 DIAGNOSIS — Z72.0 TOBACCO USER: ICD-10-CM

## 2022-05-26 DIAGNOSIS — D72.829 LEUKOCYTOSIS, UNSPECIFIED TYPE: ICD-10-CM

## 2022-05-26 DIAGNOSIS — R63.6 UNDERWEIGHT: ICD-10-CM

## 2022-05-26 DIAGNOSIS — Z23 NEED FOR IMMUNIZATION AGAINST INFLUENZA: ICD-10-CM

## 2022-05-26 DIAGNOSIS — Z12.11 ENCOUNTER FOR SCREENING COLONOSCOPY: ICD-10-CM

## 2022-05-26 DIAGNOSIS — E78.2 MIXED HYPERLIPIDEMIA: ICD-10-CM

## 2022-05-26 DIAGNOSIS — I10 ESSENTIAL HYPERTENSION: ICD-10-CM

## 2022-05-26 DIAGNOSIS — J44.9 CHRONIC OBSTRUCTIVE PULMONARY DISEASE, UNSPECIFIED COPD TYPE: ICD-10-CM

## 2022-05-26 DIAGNOSIS — M81.0 AGE RELATED OSTEOPOROSIS, UNSPECIFIED PATHOLOGICAL FRACTURE PRESENCE: ICD-10-CM

## 2022-05-26 DIAGNOSIS — Z71.6 TOBACCO ABUSE COUNSELING: ICD-10-CM

## 2022-05-26 DIAGNOSIS — M81.0 OSTEOPOROSIS, UNSPECIFIED OSTEOPOROSIS TYPE, UNSPECIFIED PATHOLOGICAL FRACTURE PRESENCE: ICD-10-CM

## 2022-05-26 DIAGNOSIS — E43 UNSPECIFIED SEVERE PROTEIN-CALORIE MALNUTRITION: ICD-10-CM

## 2022-05-26 DIAGNOSIS — J06.9 UPPER RESPIRATORY TRACT INFECTION, UNSPECIFIED TYPE: Primary | ICD-10-CM

## 2022-05-26 DIAGNOSIS — N18.2 STAGE 2 CHRONIC KIDNEY DISEASE: ICD-10-CM

## 2022-05-26 LAB
25(OH)D3 SERPL-MCNC: 30.1 NG/ML (ref 30–100)
BASOPHILS # BLD AUTO: 0.1 10*3/MM3 (ref 0–0.2)
BASOPHILS NFR BLD AUTO: 0.6 % (ref 0–1.5)
DEPRECATED RDW RBC AUTO: 46 FL (ref 37–54)
EOSINOPHIL # BLD AUTO: 0.19 10*3/MM3 (ref 0–0.4)
EOSINOPHIL NFR BLD AUTO: 1.1 % (ref 0.3–6.2)
ERYTHROCYTE [DISTWIDTH] IN BLOOD BY AUTOMATED COUNT: 12.9 % (ref 12.3–15.4)
HCT VFR BLD AUTO: 40.4 % (ref 34–46.6)
HGB BLD-MCNC: 13.4 G/DL (ref 12–15.9)
IMM GRANULOCYTES # BLD AUTO: 0.17 10*3/MM3 (ref 0–0.05)
IMM GRANULOCYTES NFR BLD AUTO: 0.9 % (ref 0–0.5)
LYMPHOCYTES # BLD AUTO: 2.91 10*3/MM3 (ref 0.7–3.1)
LYMPHOCYTES NFR BLD AUTO: 16.1 % (ref 19.6–45.3)
MCH RBC QN AUTO: 32.1 PG (ref 26.6–33)
MCHC RBC AUTO-ENTMCNC: 33.2 G/DL (ref 31.5–35.7)
MCV RBC AUTO: 96.7 FL (ref 79–97)
MONOCYTES # BLD AUTO: 1.22 10*3/MM3 (ref 0.1–0.9)
MONOCYTES NFR BLD AUTO: 6.8 % (ref 5–12)
NEUTROPHILS NFR BLD AUTO: 13.44 10*3/MM3 (ref 1.7–7)
NEUTROPHILS NFR BLD AUTO: 74.5 % (ref 42.7–76)
NRBC BLD AUTO-RTO: 0 /100 WBC (ref 0–0.2)
PLATELET # BLD AUTO: 443 10*3/MM3 (ref 140–450)
PMV BLD AUTO: 9.1 FL (ref 6–12)
RBC # BLD AUTO: 4.18 10*6/MM3 (ref 3.77–5.28)
VIT B12 BLD-MCNC: 397 PG/ML (ref 211–946)
WBC NRBC COR # BLD: 18.03 10*3/MM3 (ref 3.4–10.8)

## 2022-05-26 PROCEDURE — 85025 COMPLETE CBC W/AUTO DIFF WBC: CPT

## 2022-05-26 PROCEDURE — 80053 COMPREHEN METABOLIC PANEL: CPT

## 2022-05-26 PROCEDURE — 82607 VITAMIN B-12: CPT

## 2022-05-26 PROCEDURE — 99214 OFFICE O/P EST MOD 30 MIN: CPT | Performed by: FAMILY MEDICINE

## 2022-05-26 PROCEDURE — 82306 VITAMIN D 25 HYDROXY: CPT

## 2022-05-26 PROCEDURE — 80061 LIPID PANEL: CPT

## 2022-05-26 RX ORDER — SOFT LENS DISINFECTANT
1 SOLUTION, NON-ORAL MISCELLANEOUS 4 TIMES DAILY
Qty: 1 EACH | Refills: 1 | Status: SHIPPED | OUTPATIENT
Start: 2022-05-26

## 2022-05-26 RX ORDER — DOXYCYCLINE HYCLATE 100 MG/1
100 CAPSULE ORAL 2 TIMES DAILY
Qty: 14 CAPSULE | Refills: 0 | Status: SHIPPED | OUTPATIENT
Start: 2022-05-26 | End: 2022-06-02

## 2022-05-26 RX ORDER — AZITHROMYCIN 250 MG/1
TABLET, FILM COATED ORAL
Qty: 6 TABLET | Refills: 0 | Status: SHIPPED | OUTPATIENT
Start: 2022-05-26 | End: 2022-05-26

## 2022-05-26 RX ORDER — SOFT LENS DISINFECTANT
1 SOLUTION, NON-ORAL MISCELLANEOUS 4 TIMES DAILY
Qty: 1 EACH | Refills: 1 | Status: SHIPPED | OUTPATIENT
Start: 2022-05-26 | End: 2022-05-26 | Stop reason: SDUPTHER

## 2022-05-26 RX ORDER — ALBUTEROL SULFATE 1.25 MG/3ML
1 SOLUTION RESPIRATORY (INHALATION) EVERY 6 HOURS PRN
Qty: 360 ML | Refills: 3 | Status: SHIPPED | OUTPATIENT
Start: 2022-05-26 | End: 2023-02-16 | Stop reason: SDUPTHER

## 2022-05-26 RX ORDER — ALBUTEROL SULFATE 1.25 MG/3ML
1 SOLUTION RESPIRATORY (INHALATION) EVERY 6 HOURS PRN
Qty: 9 ML | Refills: 12 | Status: SHIPPED | OUTPATIENT
Start: 2022-05-26 | End: 2022-05-26 | Stop reason: SDUPTHER

## 2022-05-26 RX ORDER — PREDNISONE 10 MG/1
TABLET ORAL
Qty: 30 TABLET | Refills: 0 | Status: SHIPPED | OUTPATIENT
Start: 2022-05-26 | End: 2022-06-09

## 2022-05-26 NOTE — PATIENT INSTRUCTIONS
Call Hematology and Gastoenterology    Albuterol nebs every 6 hours    Predisone tapering dose    Doxycycline 100 mg every 12 hours for 7 days    Anoro maintenance inhaler. Call if not covered    Recheck in 2 weeks    Labwork     DEXA scan at imaging center

## 2022-05-26 NOTE — TELEPHONE ENCOUNTER
Pharmacy called and stated that each treat is 3 mL. The script was sent for 9 mL so that is only 3 treatments. Pt takes every 6 hours so that is 4 treatments a day which is 12 mL. They said 12 mL a day for a 30 day supply would be 360 mL.

## 2022-05-27 ENCOUNTER — TELEPHONE (OUTPATIENT)
Dept: FAMILY MEDICINE CLINIC | Facility: CLINIC | Age: 67
End: 2022-05-27

## 2022-05-27 LAB
ALBUMIN SERPL-MCNC: 3.4 G/DL (ref 3.5–5.2)
ALBUMIN/GLOB SERPL: 0.8 G/DL
ALP SERPL-CCNC: 158 U/L (ref 39–117)
ALT SERPL W P-5'-P-CCNC: 7 U/L (ref 1–33)
ANION GAP SERPL CALCULATED.3IONS-SCNC: 13.6 MMOL/L (ref 5–15)
AST SERPL-CCNC: 13 U/L (ref 1–32)
BILIRUB SERPL-MCNC: 0.2 MG/DL (ref 0–1.2)
BUN SERPL-MCNC: 10 MG/DL (ref 8–23)
BUN/CREAT SERPL: 12.3 (ref 7–25)
CALCIUM SPEC-SCNC: 10.2 MG/DL (ref 8.6–10.5)
CHLORIDE SERPL-SCNC: 102 MMOL/L (ref 98–107)
CHOLEST SERPL-MCNC: 118 MG/DL (ref 0–200)
CO2 SERPL-SCNC: 24.4 MMOL/L (ref 22–29)
CREAT SERPL-MCNC: 0.81 MG/DL (ref 0.57–1)
EGFRCR SERPLBLD CKD-EPI 2021: 79.7 ML/MIN/1.73
GLOBULIN UR ELPH-MCNC: 4.1 GM/DL
GLUCOSE SERPL-MCNC: 65 MG/DL (ref 65–99)
HDLC SERPL-MCNC: 43 MG/DL (ref 40–60)
LDLC SERPL CALC-MCNC: 57 MG/DL (ref 0–100)
LDLC/HDLC SERPL: 1.3 {RATIO}
POTASSIUM SERPL-SCNC: 4.5 MMOL/L (ref 3.5–5.2)
PROT SERPL-MCNC: 7.5 G/DL (ref 6–8.5)
SODIUM SERPL-SCNC: 140 MMOL/L (ref 136–145)
TRIGL SERPL-MCNC: 95 MG/DL (ref 0–150)
VLDLC SERPL-MCNC: 18 MG/DL (ref 5–40)

## 2022-05-27 NOTE — TELEPHONE ENCOUNTER
----- Message from Diego Gusman MD sent at 5/27/2022  7:22 AM CDT -----  Please call pt    Pt has on chest x-ray right lower lobe infiltrate suggestive of infection. Continue with doxycycline. Smoking cessation, prednisone tapering dose and albuterol nebs     If not improving  or getting worse in a few days she is to go to ER     Recheck on next visit thanks

## 2022-05-27 NOTE — TELEPHONE ENCOUNTER
----- Message from Diego Gusman MD sent at 5/27/2022  7:32 AM CDT -----  Please call pt    On CMP albumin is low and recommend more protein in diet    Alkaline phosphatase high likely due to pt's history of osteoporosis     Kidney function stable with GFR in 70s. Recommend more water intake     Vitamin D is low normal continue with vitamin D supplement    Recheck on next visit thanks

## 2022-05-31 RX ORDER — ALBUTEROL SULFATE 90 UG/1
AEROSOL, METERED RESPIRATORY (INHALATION)
Qty: 18 G | Refills: 3 | Status: SHIPPED | OUTPATIENT
Start: 2022-05-31 | End: 2023-02-16 | Stop reason: SDUPTHER

## 2022-06-02 NOTE — PROGRESS NOTES
Subjective:  Jenn Lee is a 67 y.o. female who presents for       Patient Active Problem List   Diagnosis   • Lower abdominal pain   • Closed compression fracture of thoracic vertebra (HCC)   • Osteoporosis   • Migraine   • General medical examination   • Chronic thoracic back pain   • Tobacco user   • Acute hip pain, left   • Osteoarthritis of both hips   • Lumbar spine pain   • Trochanteric bursitis of left hip   • Annual physical exam   • Tobacco abuse counseling   • Chronic low back pain   • Essential hypertension   • Hyperlipidemia   • Chronic bilateral thoracic back pain   • Severe protein-calorie malnutrition (HCC)   • Lymphocytosis   • Stage 2 chronic kidney disease   • Allergic rhinitis   • Unspecified severe protein-calorie malnutrition (HCC)   • Underweight   • Chronic bronchitis (HCC)   • COPD with exacerbation (HCC)   • Chronic midline thoracic back pain   • Age related osteoporosis   • Chronic obstructive pulmonary disease (HCC)   • Community acquired pneumonia           Current Outpatient Medications:   •  acetaminophen-codeine (TYLENOL #3) 300-30 MG per tablet, Take 1 tablet by mouth Every 4 (Four) Hours As Needed for Moderate Pain ., Disp: 30 tablet, Rfl: 0  •  albuterol (ACCUNEB) 1.25 MG/3ML nebulizer solution, Take 3 mL by nebulization Every 6 (Six) Hours As Needed for Wheezing or Shortness of Air., Disp: 360 mL, Rfl: 3  •  albuterol sulfate  (90 Base) MCG/ACT inhaler, INHALE TWO PUFFS BY MOUTH EVERY 4 HOURS AS NEEDED FOR WHEEZING, Disp: 18 g, Rfl: 3  •  amitriptyline (ELAVIL) 25 MG tablet, TAKE TWO TABLETS BY MOUTH EVERY NIGHT, Disp: 180 tablet, Rfl: 3  •  amLODIPine (NORVASC) 10 MG tablet, TAKE ONE TABLET BY MOUTH ONCE DAILY, Disp: 90 tablet, Rfl: 3  •  calcium-vitamin D 250-100 MG-UNIT per tablet, Take 1 tablet by mouth 2 (Two) Times a Day., Disp: 60 tablet, Rfl: 3  •  fluticasone (FLONASE) 50 MCG/ACT nasal spray, INSTILL 2 SPRAYS IN EACH NOSTRIL ONCE DAILY AS DIRECTED BY  PROVIDER, Disp: 16 g, Rfl: 3  •  montelukast (SINGULAIR) 10 MG tablet, TAKE ONE TABLET BY MOUTH EVERY NIGHT AT BEDTIME, Disp: 90 tablet, Rfl: 3  •  Multiple Minerals-Vitamins (Citracal Plus) tablet, Take 1 tablet by mouth Daily., Disp: 30 each, Rfl: 3  •  Multiple Minerals-Vitamins (Citracal Plus) tablet, Take 1 tablet by mouth Daily., Disp: 30 each, Rfl: 3  •  Nebulizer device, 1 application 4 (Four) Times a Day., Disp: 1 each, Rfl: 1  •  predniSONE (DELTASONE) 10 MG tablet, Take 4 pills for 4 days 3 pills for 3 days 2 pills for 2 days 1 pill, Disp: 30 tablet, Rfl: 0  •  propranolol (INDERAL) 40 MG tablet, TAKE ONE TABLET BY MOUTH ONCE DAILY, Disp: 90 tablet, Rfl: 3  •  simvastatin (Zocor) 20 MG tablet, Take 1 tablet by mouth Every Night., Disp: 90 tablet, Rfl: 3  •  Turmeric 500 MG capsule, Take 500 mg by mouth Daily., Disp: 30 capsule, Rfl: 3  •  umeclidinium-vilanterol (Anoro Ellipta) 62.5-25 MCG/INH aerosol powder  inhaler, Inhale 1 puff Daily., Disp: 60 each, Rfl: 3  •  vitamin D (ERGOCALCIFEROL) 1.25 MG (39521 UT) capsule capsule, TAKE ONE CAPSULE BY MOUTH ONCE A WEEK, Disp: 5 capsule, Rfl: 3      Pt is 68 yo female with management of tobacco smoker, COPD, chronic bronchitis  osteoporosis, osteaoarthritis of both hips, multiple joint pain, HLP, HTN, chronic pain, migraine headaches, sp tonsillectomy, sp hysterectomy, compression fracture of thoracic vertebra, chronic hip pain (trochanteric bursitis of left hip), leukocytosis, CKD stage 2, leukocytosis, viamin D deficiency positive cologuard test, history of cataracts, history of skin cancer     5/26/22 in office visit for recheck.  Pt states she has been ill for past few weeks. She is coughing up brownish sputum. Continues to smoke 1/2 ppd. Her breathing has been labored. She is due for new labwork and DEXA scan. Her HEmatology and Gastro appt has been canceled and she will have to reschedule. She did not get her COVID vaccination yet     6/9/22 in office  visit for recheck. Pt was treated for COPD exacerbation last visit. She was given albuterol nebx, prednisone tapering dose, doxycycline. Her labwork showed on CBC WBC at 18.08 along with neturophil count at 13.44   Chest x-ray showed right lower lobe subsegmental infiltrates and atelectasis along with small right pleural effusion. She is feeling somewhat better but still has cough/congestion. No fever but diatstolic BP at 60 and HR is high at100. Pt has been going through a lot the past week. She is in the process moving.   She continues to smoke tobacco about 1/2 ppd.        Pneumonia  She complains of cough, difficulty breathing, frequent throat clearing and shortness of breath. There is no chest tightness, hemoptysis, hoarse voice, sputum production or wheezing. This is a recurrent problem. The current episode started 1 to 4 weeks ago. The problem occurs constantly. The problem has been gradually improving. Associated symptoms include malaise/fatigue. Pertinent negatives include no appetite change, chest pain, dyspnea on exertion, ear congestion, ear pain, fever, headaches, heartburn, myalgias, nasal congestion, orthopnea, PND, postnasal drip, rhinorrhea, sneezing, sore throat, sweats, trouble swallowing or weight loss. Her symptoms are aggravated by any activity. Her symptoms are alleviated by beta-agonist, steroid inhaler and oral steroids. She reports moderate improvement on treatment. Her symptoms are not alleviated by beta-agonist and steroid inhaler. Her past medical history is significant for bronchitis and COPD. There is no history of asthma, bronchiectasis, emphysema or pneumonia.   URI   This is a new problem. The current episode started 1 to 4 weeks ago. The problem has been gradually worsening. There has been no fever. Associated symptoms include coughing. Pertinent negatives include no abdominal pain, chest pain, congestion, diarrhea, dysuria, ear pain, headaches, joint pain, joint swelling, nausea,  neck pain, plugged ear sensation, rash, rhinorrhea, sinus pain, sneezing, sore throat, swollen glands, vomiting or wheezing. She has tried nothing for the symptoms. The treatment provided no relief.   Shortness of Breath  This is a chronic problem. The problem occurs constantly. The problem has been unchanged. Pertinent negatives include no abdominal pain, chest pain, claudication, coryza, ear pain, fever, headaches, hemoptysis, leg pain, leg swelling, neck pain, orthopnea, PND, rash, rhinorrhea, sore throat, sputum production, swollen glands, syncope, vomiting or wheezing. Nothing aggravates the symptoms. Risk factors include smoking. She has tried beta agonist inhalers for the symptoms. The treatment provided mild relief. Her past medical history is significant for chronic lung disease and COPD. There is no history of allergies, aspirin allergies, asthma, bronchiolitis, CAD, DVT, a heart failure, PE, pneumonia or a recent surgery.   COPD  She complains of shortness of breath. There is no hemoptysis, sputum production or wheezing. This is a chronic problem. The current episode started more than 1 year ago. The problem occurs constantly. The problem has been gradually worsening. Associated symptoms include dyspnea on exertion. Pertinent negatives include no appetite change, chest pain, ear congestion, ear pain, fever, headaches, malaise/fatigue, myalgias, nasal congestion, orthopnea, PND, postnasal drip, rhinorrhea, sneezing, sore throat, sweats, trouble swallowing or weight loss. Her symptoms are aggravated by strenuous activity. Her symptoms are alleviated by beta-agonist. She reports moderate improvement on treatment. Her symptoms are not alleviated by beta-agonist. Risk factors for lung disease include smoking/tobacco exposure. Her past medical history is significant for COPD. There is no history of asthma, bronchiectasis, bronchitis, emphysema or pneumonia.   Hyperlipidemia  This is a chronic problem. The  current episode started more than 1 year ago. The problem is controlled. Recent lipid tests were reviewed and are variable. She has no history of chronic renal disease, diabetes, hypothyroidism, liver disease, obesity or nephrotic syndrome. Associated symptoms include shortness of breath. Pertinent negatives include no chest pain. Current antihyperlipidemic treatment includes statins. The current treatment provides moderate improvement of lipids. There are no compliance problems.    Chronic Kidney Disease  This is a chronic problem. The current episode started more than 1 year ago. The problem occurs constantly. The problem has been unchanged. Associated symptoms include arthralgias, coughing, fatigue, headaches, numbness and weakness. Pertinent negatives include no chest pain, chills, congestion, diaphoresis, fever, nausea, sore throat or vomiting. She has tried nothing for the symptoms. The treatment provided no relief.   Hypertension   This is a chronic problem. The current episode started more than 1 year ago. The problem is unchanged. The problem is controlled. Associated symptoms include shortness of breath. Pertinent negatives include no anxiety, blurred vision, chest pain, headaches, malaise/fatigue, neck pain, orthopnea, palpitations, PND or sweats. There are no known risk factors for coronary artery disease. Past treatments include beta blockers and calcium channel blockers. Current antihypertension treatment includes beta blockers and calcium channel blockers. The current treatment provides no improvement. There are no compliance problems.  There is no history of angina, kidney disease, CAD/MI, CVA, heart failure, left ventricular hypertrophy, PVD or retinopathy. There is no history of chronic renal disease, coarctation of the aorta, hyperaldosteronism, hypercortisolism, hyperparathyroidism, a hypertension causing med, pheochromocytoma, renovascular disease, sleep apnea or a thyroid problem.     Review of  Systems  Review of Systems   Constitutional: Positive for activity change, fatigue and malaise/fatigue. Negative for appetite change, chills, diaphoresis, fever and weight loss.   HENT: Negative for congestion, ear pain, hoarse voice, postnasal drip, rhinorrhea, sinus pressure, sinus pain, sneezing, sore throat, trouble swallowing and voice change.    Respiratory: Positive for cough and shortness of breath. Negative for hemoptysis, sputum production, choking, chest tightness, wheezing and stridor.    Cardiovascular: Negative for chest pain, dyspnea on exertion and PND.   Gastrointestinal: Negative for diarrhea, heartburn, nausea and vomiting.   Musculoskeletal: Positive for arthralgias and back pain. Negative for myalgias.   Neurological: Positive for weakness and numbness. Negative for headaches.       Patient Active Problem List   Diagnosis   • Lower abdominal pain   • Closed compression fracture of thoracic vertebra (HCC)   • Osteoporosis   • Migraine   • General medical examination   • Chronic thoracic back pain   • Tobacco user   • Acute hip pain, left   • Osteoarthritis of both hips   • Lumbar spine pain   • Trochanteric bursitis of left hip   • Annual physical exam   • Tobacco abuse counseling   • Chronic low back pain   • Essential hypertension   • Hyperlipidemia   • Chronic bilateral thoracic back pain   • Severe protein-calorie malnutrition (HCC)   • Lymphocytosis   • Stage 2 chronic kidney disease   • Allergic rhinitis   • Unspecified severe protein-calorie malnutrition (HCC)   • Underweight   • Chronic bronchitis (HCC)   • COPD with exacerbation (HCC)   • Chronic midline thoracic back pain   • Age related osteoporosis   • Chronic obstructive pulmonary disease (HCC)   • Community acquired pneumonia     Past Surgical History:   Procedure Laterality Date   • HYSTERECTOMY     • TONSILLECTOMY       Social History     Socioeconomic History   • Marital status:    Tobacco Use   • Smoking status: Current  Every Day Smoker   • Smokeless tobacco: Never Used   Substance and Sexual Activity   • Alcohol use: Yes     Comment: social   • Drug use: No   • Sexual activity: Defer     Family History   Problem Relation Age of Onset   • Alcohol abuse Other    • Asthma Other    • Breast cancer Other    • Diabetes Other    • Glaucoma Other    • Heart disease Other    • Hypertension Other    • Stroke Other      Lab on 05/26/2022   Component Date Value Ref Range Status   • WBC 05/26/2022 18.03 (A) 3.40 - 10.80 10*3/mm3 Final   • RBC 05/26/2022 4.18  3.77 - 5.28 10*6/mm3 Final   • Hemoglobin 05/26/2022 13.4  12.0 - 15.9 g/dL Final   • Hematocrit 05/26/2022 40.4  34.0 - 46.6 % Final   • MCV 05/26/2022 96.7  79.0 - 97.0 fL Final   • MCH 05/26/2022 32.1  26.6 - 33.0 pg Final   • MCHC 05/26/2022 33.2  31.5 - 35.7 g/dL Final   • RDW 05/26/2022 12.9  12.3 - 15.4 % Final   • RDW-SD 05/26/2022 46.0  37.0 - 54.0 fl Final   • MPV 05/26/2022 9.1  6.0 - 12.0 fL Final   • Platelets 05/26/2022 443  140 - 450 10*3/mm3 Final   • Neutrophil % 05/26/2022 74.5  42.7 - 76.0 % Final   • Lymphocyte % 05/26/2022 16.1 (A) 19.6 - 45.3 % Final   • Monocyte % 05/26/2022 6.8  5.0 - 12.0 % Final   • Eosinophil % 05/26/2022 1.1  0.3 - 6.2 % Final   • Basophil % 05/26/2022 0.6  0.0 - 1.5 % Final   • Immature Grans % 05/26/2022 0.9 (A) 0.0 - 0.5 % Final   • Neutrophils, Absolute 05/26/2022 13.44 (A) 1.70 - 7.00 10*3/mm3 Final   • Lymphocytes, Absolute 05/26/2022 2.91  0.70 - 3.10 10*3/mm3 Final   • Monocytes, Absolute 05/26/2022 1.22 (A) 0.10 - 0.90 10*3/mm3 Final   • Eosinophils, Absolute 05/26/2022 0.19  0.00 - 0.40 10*3/mm3 Final   • Basophils, Absolute 05/26/2022 0.10  0.00 - 0.20 10*3/mm3 Final   • Immature Grans, Absolute 05/26/2022 0.17 (A) 0.00 - 0.05 10*3/mm3 Final   • nRBC 05/26/2022 0.0  0.0 - 0.2 /100 WBC Final   • Glucose 05/26/2022 65  65 - 99 mg/dL Final   • BUN 05/26/2022 10  8 - 23 mg/dL Final   • Creatinine 05/26/2022 0.81  0.57 - 1.00 mg/dL Final    • Sodium 05/26/2022 140  136 - 145 mmol/L Final   • Potassium 05/26/2022 4.5  3.5 - 5.2 mmol/L Final   • Chloride 05/26/2022 102  98 - 107 mmol/L Final   • CO2 05/26/2022 24.4  22.0 - 29.0 mmol/L Final   • Calcium 05/26/2022 10.2  8.6 - 10.5 mg/dL Final   • Total Protein 05/26/2022 7.5  6.0 - 8.5 g/dL Final   • Albumin 05/26/2022 3.40 (A) 3.50 - 5.20 g/dL Final   • ALT (SGPT) 05/26/2022 7  1 - 33 U/L Final   • AST (SGOT) 05/26/2022 13  1 - 32 U/L Final   • Alkaline Phosphatase 05/26/2022 158 (A) 39 - 117 U/L Final   • Total Bilirubin 05/26/2022 0.2  0.0 - 1.2 mg/dL Final   • Globulin 05/26/2022 4.1  gm/dL Final   • A/G Ratio 05/26/2022 0.8  g/dL Final   • BUN/Creatinine Ratio 05/26/2022 12.3  7.0 - 25.0 Final   • Anion Gap 05/26/2022 13.6  5.0 - 15.0 mmol/L Final   • eGFR 05/26/2022 79.7  >60.0 mL/min/1.73 Final    National Kidney Foundation and American Society of Nephrology (ASN) Task Force recommended calculation based on the Chronic Kidney Disease Epidemiology Collaboration (CKD-EPI) equation refit without adjustment for race.   • Total Cholesterol 05/26/2022 118  0 - 200 mg/dL Final   • Triglycerides 05/26/2022 95  0 - 150 mg/dL Final   • HDL Cholesterol 05/26/2022 43  40 - 60 mg/dL Final   • LDL Cholesterol  05/26/2022 57  0 - 100 mg/dL Final   • VLDL Cholesterol 05/26/2022 18  5 - 40 mg/dL Final   • LDL/HDL Ratio 05/26/2022 1.30   Final   • 25 Hydroxy, Vitamin D 05/26/2022 30.1  30.0 - 100.0 ng/ml Final   • Vitamin B-12 05/26/2022 397  211 - 946 pg/mL Final      XR Chest PA & Lateral  Narrative: TWO VIEW CHEST    HISTORY: Cough. Dyspnea. Upper respiratory infection.    Frontal and lateral films of the chest were obtained.    COMPARISON: February 16, 2018    FINDINGS:    Right lower lobe subsegmental infiltrate and atelectasis.  Small right pleural effusion.  The heart is not enlarged.  The pulmonary vasculature is not increased.  No pneumothorax.  Osteoporosis.  Old moderate compression deformity lower  "thoracic spine.  Impression: CONCLUSION:  Right lower lobe subsegmental infiltrate and atelectasis.  Small right pleural effusion.    [id number]    Electronically signed by:  Luke Elliott MD  5/26/2022 8:18 PM CDT  Workstation: [id number]    [unfilled]  Immunization History   Administered Date(s) Administered   • Fluad Quad 65+ 10/14/2020   • Fluzone High-Dose 65+yrs 11/23/2021   • Pneumococcal Polysaccharide (PPSV23) 08/31/2021       The following portions of the patient's history were reviewed and updated as appropriate: allergies, current medications, past family history, past medical history, past social history, past surgical history and problem list.        Physical Exam  /60 (BP Location: Right arm, Patient Position: Sitting, Cuff Size: Adult)   Pulse 100   Temp 97.9 °F (36.6 °C)   Ht 157.5 cm (62\")   Wt 43 kg (94 lb 12.8 oz)   SpO2 96%   BMI 17.34 kg/m²     Physical Exam  Vitals and nursing note reviewed.   Constitutional:       Appearance: She is well-developed. She is not diaphoretic.   HENT:      Head: Normocephalic and atraumatic.      Right Ear: External ear normal.   Eyes:      Conjunctiva/sclera: Conjunctivae normal.      Pupils: Pupils are equal, round, and reactive to light.   Cardiovascular:      Rate and Rhythm: Normal rate and regular rhythm.      Heart sounds: Normal heart sounds. No murmur heard.  Pulmonary:      Effort: Respiratory distress present.      Comments: Decreased breath sounds   Abdominal:      General: Bowel sounds are normal. There is no distension.      Palpations: Abdomen is soft.      Tenderness: There is no abdominal tenderness.   Musculoskeletal:         General: Tenderness present. No deformity. Normal range of motion.      Cervical back: Normal range of motion and neck supple.   Skin:     General: Skin is warm.      Coloration: Skin is not pale.      Findings: No erythema or rash.   Neurological:      Mental Status: She is alert and oriented to person, place, and " time.      Cranial Nerves: No cranial nerve deficit.   Psychiatric:         Behavior: Behavior normal.         [unfilled]   Diagnosis Plan   1. Severe protein-calorie malnutrition (HCC)     2. Stage 2 chronic kidney disease     3. Underweight     4. Chronic bronchitis, unspecified chronic bronchitis type (HCC)     5. COPD with exacerbation (HCC)     6. Tobacco user     7. Age-related osteoporosis without current pathological fracture     8. Community acquired pneumonia, unspecified laterality              -recommend labwork    -recommend colonoscopy  - referred back to Dr. Cr with Gastroenterology   -recommend COVID-19 vaccination   -recommend tdap and shingles/pneumonia  vaccination - pneumonia 23 vaccination today   -URI/CAP - on albuterol nebs. Was on doxycyclinel. Will switch to levaquin 750 mg daily for 5 days. Pt is high risk based on Curb 65 score at 2 based on diastolic BP and age. I recommend pt go to ER or hospital if not better over the weekend for possible IV abx. Continue nebulizers. Prednisone 10 mg daily for 5 days. Pt is requesting mask for nebulizer machine. Clinic does not usually give but will supply today. Pt agrees to receive it today.  Will get chest x-ray today.  Rocephin 1 gram IM today   -leukocytosis - recommend Hematology referral. Referred  to Dr. Madera   -COPD/COPD exacerbation/ dyspnea- recomemnd Pulmonology referral on Anoro 1 puff daily.  On albuterol PRN and nebulizers   -migraine headaches - on elavil 50 mg at bedtime on propranolol 40 mg gdaily.   -underweight/moderate protein malnutrition - recommend high calorie diet. BMI at 16.61. recommend high calorie diet, recommend smoking cesation   -CKD stage 2 - gave information. Continue to monitor   -allergic rhinitis - on flonase nasal spray   -HTN -  controlled on norvasc 10 mg daily.   propranolol 50 mg PO q daily.   -HLP on simvastatin 20 mg Po qhs   -old compression fracture of thoracic spine - referred to Spine Surgeon  but surgery not indicated   on elavil 50 mg PO qhs  Recommend  Pain management but pt declined. Try tylenol 3 with codeine. Gave 30 pills   - osteoporosis - pt already on citracal with Vitamin D.r ecommend prolia but pt declined.  Recommend repeat DEXA scan  Was on fosamax but has been on this for years. prolia injection declined by insurance. Recommend bone clinic but pt declined   - tobacco user advised pt to quit smoking >5 minutes.  Pt declines nicotine patches or chantix. Recommend 1 800 QUIT NOW  -advised pt to be safe and call with questions and concerns  -advised pt to go to ER or call 911 if symptoms worrisome or severe  -advised pt to followup with specialist and referrals   -advised pt to be safe during COVID-19 pandemic   I spent 30 minutes caring for Jenn on this date of service. This time includes time spent by me in the following activities: preparing for the visit, reviewing tests, obtaining and/or reviewing a separately obtained history, performing a medically appropriate examination and/or evaluation, counseling and educating the patient/family/caregiver, ordering medications, tests, or procedures, referring and communicating with other health care professionals, documenting information in the medical record, independently interpreting results and communicating that information with the patient/family/caregiver and care coordination  -recheck in 2 weeks         This document has been electronically signed by Diego Gusman MD on June 9, 2022 08:16 CDT

## 2022-06-06 RX ORDER — MONTELUKAST SODIUM 10 MG/1
TABLET ORAL
Qty: 90 TABLET | Refills: 3 | Status: SHIPPED | OUTPATIENT
Start: 2022-06-06 | End: 2023-02-16 | Stop reason: SDUPTHER

## 2022-06-06 RX ORDER — PROPRANOLOL HYDROCHLORIDE 40 MG/1
TABLET ORAL
Qty: 90 TABLET | Refills: 3 | Status: SHIPPED | OUTPATIENT
Start: 2022-06-06 | End: 2023-02-16 | Stop reason: SDUPTHER

## 2022-06-06 RX ORDER — AMITRIPTYLINE HYDROCHLORIDE 25 MG/1
TABLET, FILM COATED ORAL
Qty: 180 TABLET | Refills: 3 | Status: SHIPPED | OUTPATIENT
Start: 2022-06-06 | End: 2023-02-16 | Stop reason: SDUPTHER

## 2022-06-06 RX ORDER — AMLODIPINE BESYLATE 10 MG/1
TABLET ORAL
Qty: 90 TABLET | Refills: 3 | Status: SHIPPED | OUTPATIENT
Start: 2022-06-06 | End: 2023-02-16 | Stop reason: SDUPTHER

## 2022-06-09 ENCOUNTER — OFFICE VISIT (OUTPATIENT)
Dept: FAMILY MEDICINE CLINIC | Facility: CLINIC | Age: 67
End: 2022-06-09

## 2022-06-09 ENCOUNTER — LAB (OUTPATIENT)
Dept: LAB | Facility: HOSPITAL | Age: 67
End: 2022-06-09

## 2022-06-09 VITALS
BODY MASS INDEX: 17.44 KG/M2 | DIASTOLIC BLOOD PRESSURE: 60 MMHG | HEART RATE: 100 BPM | SYSTOLIC BLOOD PRESSURE: 108 MMHG | HEIGHT: 62 IN | OXYGEN SATURATION: 96 % | TEMPERATURE: 97.9 F | WEIGHT: 94.8 LBS

## 2022-06-09 DIAGNOSIS — J44.1 COPD WITH EXACERBATION: ICD-10-CM

## 2022-06-09 DIAGNOSIS — M81.0 AGE-RELATED OSTEOPOROSIS WITHOUT CURRENT PATHOLOGICAL FRACTURE: ICD-10-CM

## 2022-06-09 DIAGNOSIS — N18.2 STAGE 2 CHRONIC KIDNEY DISEASE: ICD-10-CM

## 2022-06-09 DIAGNOSIS — R63.6 UNDERWEIGHT: ICD-10-CM

## 2022-06-09 DIAGNOSIS — Z72.0 TOBACCO USER: ICD-10-CM

## 2022-06-09 DIAGNOSIS — J42 CHRONIC BRONCHITIS, UNSPECIFIED CHRONIC BRONCHITIS TYPE: ICD-10-CM

## 2022-06-09 DIAGNOSIS — E43 SEVERE PROTEIN-CALORIE MALNUTRITION: ICD-10-CM

## 2022-06-09 DIAGNOSIS — R06.00 DYSPNEA, UNSPECIFIED TYPE: ICD-10-CM

## 2022-06-09 DIAGNOSIS — J18.9 COMMUNITY ACQUIRED PNEUMONIA, UNSPECIFIED LATERALITY: Primary | ICD-10-CM

## 2022-06-09 PROCEDURE — 99214 OFFICE O/P EST MOD 30 MIN: CPT | Performed by: FAMILY MEDICINE

## 2022-06-09 PROCEDURE — 83880 ASSAY OF NATRIURETIC PEPTIDE: CPT

## 2022-06-09 PROCEDURE — 85025 COMPLETE CBC W/AUTO DIFF WBC: CPT

## 2022-06-09 PROCEDURE — 96372 THER/PROPH/DIAG INJ SC/IM: CPT | Performed by: FAMILY MEDICINE

## 2022-06-09 PROCEDURE — 86140 C-REACTIVE PROTEIN: CPT

## 2022-06-09 PROCEDURE — 80053 COMPREHEN METABOLIC PANEL: CPT

## 2022-06-09 RX ORDER — LEVOFLOXACIN 750 MG/1
750 TABLET ORAL DAILY
Qty: 5 TABLET | Refills: 0 | Status: SHIPPED | OUTPATIENT
Start: 2022-06-09 | End: 2022-06-14

## 2022-06-09 RX ORDER — SIMVASTATIN 20 MG
20 TABLET ORAL NIGHTLY
Qty: 90 TABLET | Refills: 3 | Status: SHIPPED | OUTPATIENT
Start: 2022-06-09

## 2022-06-09 RX ORDER — PREDNISONE 10 MG/1
10 TABLET ORAL DAILY
Qty: 5 TABLET | Refills: 0 | Status: SHIPPED | OUTPATIENT
Start: 2022-06-09 | End: 2023-02-16

## 2022-06-09 RX ORDER — CEFTRIAXONE 1 G/1
1 INJECTION, POWDER, FOR SOLUTION INTRAMUSCULAR; INTRAVENOUS ONCE
Status: COMPLETED | OUTPATIENT
Start: 2022-06-09 | End: 2022-06-09

## 2022-06-09 RX ADMIN — CEFTRIAXONE 1 G: 1 INJECTION, POWDER, FOR SOLUTION INTRAMUSCULAR; INTRAVENOUS at 11:57

## 2022-06-09 NOTE — PROGRESS NOTES
Injection  Injection performed in Right Dorsogluteal by Horace Capps MA. Patient tolerated the procedure well without complications.  06/09/22   Horace Capps MA

## 2022-06-10 ENCOUNTER — TELEPHONE (OUTPATIENT)
Dept: FAMILY MEDICINE CLINIC | Facility: CLINIC | Age: 67
End: 2022-06-10

## 2022-06-10 LAB
ALBUMIN SERPL-MCNC: 3.6 G/DL (ref 3.5–5.2)
ALBUMIN/GLOB SERPL: 1.2 G/DL
ALP SERPL-CCNC: 101 U/L (ref 39–117)
ALT SERPL W P-5'-P-CCNC: 12 U/L (ref 1–33)
ANION GAP SERPL CALCULATED.3IONS-SCNC: 12.5 MMOL/L (ref 5–15)
AST SERPL-CCNC: 14 U/L (ref 1–32)
BASOPHILS # BLD AUTO: 0.01 10*3/MM3 (ref 0–0.2)
BASOPHILS NFR BLD AUTO: 0.1 % (ref 0–1.5)
BILIRUB SERPL-MCNC: 0.2 MG/DL (ref 0–1.2)
BUN SERPL-MCNC: 15 MG/DL (ref 8–23)
BUN/CREAT SERPL: 16.5 (ref 7–25)
CALCIUM SPEC-SCNC: 9.3 MG/DL (ref 8.6–10.5)
CHLORIDE SERPL-SCNC: 101 MMOL/L (ref 98–107)
CO2 SERPL-SCNC: 23.5 MMOL/L (ref 22–29)
CREAT SERPL-MCNC: 0.91 MG/DL (ref 0.57–1)
CRP SERPL-MCNC: <0.3 MG/DL (ref 0–0.5)
DEPRECATED RDW RBC AUTO: 42.9 FL (ref 37–54)
EGFRCR SERPLBLD CKD-EPI 2021: 69.3 ML/MIN/1.73
EOSINOPHIL # BLD AUTO: 0 10*3/MM3 (ref 0–0.4)
EOSINOPHIL NFR BLD AUTO: 0 % (ref 0.3–6.2)
ERYTHROCYTE [DISTWIDTH] IN BLOOD BY AUTOMATED COUNT: 12.7 % (ref 12.3–15.4)
GLOBULIN UR ELPH-MCNC: 3.1 GM/DL
GLUCOSE SERPL-MCNC: 153 MG/DL (ref 65–99)
HCT VFR BLD AUTO: 37.7 % (ref 34–46.6)
HGB BLD-MCNC: 13 G/DL (ref 12–15.9)
IMM GRANULOCYTES # BLD AUTO: 0.09 10*3/MM3 (ref 0–0.05)
IMM GRANULOCYTES NFR BLD AUTO: 0.6 % (ref 0–0.5)
LYMPHOCYTES # BLD AUTO: 2.21 10*3/MM3 (ref 0.7–3.1)
LYMPHOCYTES NFR BLD AUTO: 14.2 % (ref 19.6–45.3)
MCH RBC QN AUTO: 32 PG (ref 26.6–33)
MCHC RBC AUTO-ENTMCNC: 34.5 G/DL (ref 31.5–35.7)
MCV RBC AUTO: 92.9 FL (ref 79–97)
MONOCYTES # BLD AUTO: 0.64 10*3/MM3 (ref 0.1–0.9)
MONOCYTES NFR BLD AUTO: 4.1 % (ref 5–12)
NEUTROPHILS NFR BLD AUTO: 12.6 10*3/MM3 (ref 1.7–7)
NEUTROPHILS NFR BLD AUTO: 81 % (ref 42.7–76)
NRBC BLD AUTO-RTO: 0 /100 WBC (ref 0–0.2)
NT-PROBNP SERPL-MCNC: 184 PG/ML (ref 0–900)
PLATELET # BLD AUTO: 365 10*3/MM3 (ref 140–450)
PMV BLD AUTO: 11.6 FL (ref 6–12)
POTASSIUM SERPL-SCNC: 3.8 MMOL/L (ref 3.5–5.2)
PROT SERPL-MCNC: 6.7 G/DL (ref 6–8.5)
RBC # BLD AUTO: 4.06 10*6/MM3 (ref 3.77–5.28)
SODIUM SERPL-SCNC: 137 MMOL/L (ref 136–145)
WBC NRBC COR # BLD: 15.55 10*3/MM3 (ref 3.4–10.8)

## 2022-06-10 NOTE — TELEPHONE ENCOUNTER
----- Message from Diego Gusman MD sent at 6/9/2022 11:10 PM CDT -----  Please let pt know chest x-ray shows partial clearing of the right lower lobe infiltrate compared to previous chest x-ray    Continue with abx recently prescribed. If not getting better over the weekend pt is to proceed to ER.      Recheck on next visit thanks

## 2022-06-13 ENCOUNTER — TELEPHONE (OUTPATIENT)
Dept: FAMILY MEDICINE CLINIC | Facility: CLINIC | Age: 67
End: 2022-06-13

## 2022-06-13 NOTE — TELEPHONE ENCOUNTER
----- Message from Diego Gusman MD sent at 6/11/2022 10:37 AM CDT -----  Please let pt know that CRP is back to normal range     CBC shows elevated white blood cell count but it is trending down    Continue with antibiotics and if not improving proceed to ER    Recheck on next visit thanks

## 2022-08-23 RX ORDER — FLUTICASONE PROPIONATE 50 MCG
SPRAY, SUSPENSION (ML) NASAL
Qty: 16 G | Refills: 3 | Status: SHIPPED | OUTPATIENT
Start: 2022-08-23 | End: 2023-02-16 | Stop reason: SDUPTHER

## 2023-02-15 NOTE — PROGRESS NOTES
Subjective:  eJnn Lee is a 68 y.o. female who presents for       Patient Active Problem List   Diagnosis   • Lower abdominal pain   • Closed compression fracture of thoracic vertebra (HCC)   • Osteoporosis   • Migraine   • General medical examination   • Chronic thoracic back pain   • Tobacco user   • Acute hip pain, left   • Osteoarthritis of both hips   • Lumbar spine pain   • Trochanteric bursitis of left hip   • Annual physical exam   • Tobacco abuse counseling   • Chronic low back pain   • Essential hypertension   • Hyperlipidemia   • Chronic bilateral thoracic back pain   • Severe protein-calorie malnutrition (HCC)   • Lymphocytosis   • Stage 2 chronic kidney disease   • Allergic rhinitis   • Unspecified severe protein-calorie malnutrition (HCC)   • Underweight   • Chronic bronchitis (HCC)   • COPD with exacerbation (HCC)   • Chronic midline thoracic back pain   • Age related osteoporosis   • Chronic obstructive pulmonary disease (HCC)   • Community acquired pneumonia           Current Outpatient Medications:   •  albuterol (ACCUNEB) 1.25 MG/3ML nebulizer solution, Take 3 mL by nebulization Every 6 (Six) Hours As Needed for Wheezing or Shortness of Air., Disp: 360 mL, Rfl: 3  •  albuterol sulfate  (90 Base) MCG/ACT inhaler, Inhale 2 puffs Every 4 (Four) Hours As Needed for Wheezing or Shortness of Air., Disp: 18 g, Rfl: 1  •  amitriptyline (ELAVIL) 25 MG tablet, Take 2 tablets by mouth Every Night., Disp: 180 tablet, Rfl: 1  •  amLODIPine (NORVASC) 10 MG tablet, Take 1 tablet by mouth Daily., Disp: 90 tablet, Rfl: 1  •  fluticasone (FLONASE) 50 MCG/ACT nasal spray, 1 spray into the nostril(s) as directed by provider Daily., Disp: 16 g, Rfl: 3  •  montelukast (SINGULAIR) 10 MG tablet, Take 1 tablet by mouth every night at bedtime., Disp: 90 tablet, Rfl: 1  •  Nebulizer device, 1 application 4 (Four) Times a Day., Disp: 1 each, Rfl: 1  •  propranolol (INDERAL) 40 MG tablet, Take 1 tablet by  mouth Daily., Disp: 90 tablet, Rfl: 1  •  simvastatin (Zocor) 20 MG tablet, Take 1 tablet by mouth Every Night., Disp: 90 tablet, Rfl: 3  •  umeclidinium-vilanterol (Anoro Ellipta) 62.5-25 MCG/INH aerosol powder  inhaler, Inhale 1 puff Daily., Disp: 60 each, Rfl: 3      Pt is 69 yo female with management of tobacco smoker, COPD, chronic bronchitis  osteoporosis, osteaoarthritis of both hips, multiple joint pain, HLP, HTN, chronic pain, migraine headaches, sp tonsillectomy, sp hysterectomy, compression fracture of thoracic vertebra, chronic hip pain (trochanteric bursitis of left hip), leukocytosis, CKD stage 2, leukocytosis, viamin D deficiency positive cologuard test, history of cataracts, history of skin cancer     5/26/22 in office visit for recheck.  Pt states she has been ill for past few weeks. She is coughing up brownish sputum. Continues to smoke 1/2 ppd. Her breathing has been labored. She is due for new labwork and DEXA scan. Her HEmatology and Gastro appt has been canceled and she will have to reschedule. She did not get her COVID vaccination yet     6/9/22 in office visit for recheck. Pt was treated for COPD exacerbation last visit. She was given albuterol nebx, prednisone tapering dose, doxycycline. Her labwork showed on CBC WBC at 18.08 along with neturophil count at 13.44   Chest x-ray showed right lower lobe subsegmental infiltrates and atelectasis along with small right pleural effusion. She is feeling somewhat better but still has cough/congestion. No fever but diatstolic BP at 60 and HR is high at100. Pt has been going through a lot the past week. She is in the process moving.   She continues to smoke tobacco about 1/2 ppd.      2/16/23 in office visit for recheck. Pt is due for new labwork mammogram  DEXACscreening and colonoscopy screening. She continues to smoke 1/2 ppd. She is not using Anoro but is using Breo.       Pneumonia  She complains of cough, difficulty breathing, frequent throat  clearing and shortness of breath. There is no chest tightness, hemoptysis, hoarse voice, sputum production or wheezing. This is a recurrent problem. The current episode started 1 to 4 weeks ago. The problem occurs constantly. The problem has been gradually improving. Associated symptoms include malaise/fatigue. Pertinent negatives include no appetite change, chest pain, dyspnea on exertion, ear congestion, ear pain, fever, headaches, heartburn, myalgias, nasal congestion, orthopnea, PND, postnasal drip, rhinorrhea, sneezing, sore throat, sweats, trouble swallowing or weight loss. Her symptoms are aggravated by any activity. Her symptoms are alleviated by beta-agonist, steroid inhaler and oral steroids. She reports moderate improvement on treatment. Her symptoms are not alleviated by beta-agonist and steroid inhaler. Her past medical history is significant for bronchitis and COPD. There is no history of asthma, bronchiectasis, emphysema or pneumonia.   URI   This is a new problem. The current episode started 1 to 4 weeks ago. The problem has been gradually worsening. There has been no fever. Associated symptoms include coughing. Pertinent negatives include no abdominal pain, chest pain, congestion, diarrhea, dysuria, ear pain, headaches, joint pain, joint swelling, nausea, neck pain, plugged ear sensation, rash, rhinorrhea, sinus pain, sneezing, sore throat, swollen glands, vomiting or wheezing. She has tried nothing for the symptoms. The treatment provided no relief.   Shortness of Breath  This is a chronic problem. The problem occurs constantly. The problem has been unchanged. Pertinent negatives include no abdominal pain, chest pain, claudication, coryza, ear pain, fever, headaches, hemoptysis, leg pain, leg swelling, neck pain, orthopnea, PND, rash, rhinorrhea, sore throat, sputum production, swollen glands, syncope, vomiting or wheezing. Nothing aggravates the symptoms. Risk factors include smoking. She has  tried beta agonist inhalers for the symptoms. The treatment provided mild relief. Her past medical history is significant for chronic lung disease and COPD. There is no history of allergies, aspirin allergies, asthma, bronchiolitis, CAD, DVT, a heart failure, PE, pneumonia or a recent surgery.   COPD  She complains of shortness of breath. There is no hemoptysis, sputum production or wheezing. This is a chronic problem. The current episode started more than 1 year ago. The problem occurs constantly. The problem has been gradually worsening. Associated symptoms include dyspnea on exertion. Pertinent negatives include no appetite change, chest pain, ear congestion, ear pain, fever, headaches, malaise/fatigue, myalgias, nasal congestion, orthopnea, PND, postnasal drip, rhinorrhea, sneezing, sore throat, sweats, trouble swallowing or weight loss. Her symptoms are aggravated by strenuous activity. Her symptoms are alleviated by beta-agonist. She reports moderate improvement on treatment. Her symptoms are not alleviated by beta-agonist. Risk factors for lung disease include smoking/tobacco exposure. Her past medical history is significant for COPD. There is no history of asthma, bronchiectasis, bronchitis, emphysema or pneumonia.   Hyperlipidemia  This is a chronic problem. The current episode started more than 1 year ago. The problem is controlled. Recent lipid tests were reviewed and are variable. She has no history of chronic renal disease, diabetes, hypothyroidism, liver disease, obesity or nephrotic syndrome. Associated symptoms include shortness of breath. Pertinent negatives include no chest pain. Current antihyperlipidemic treatment includes statins. The current treatment provides moderate improvement of lipids. There are no compliance problems.    Chronic Kidney Disease  This is a chronic problem. The current episode started more than 1 year ago. The problem occurs constantly. The problem has been unchanged.  Associated symptoms include arthralgias, coughing, fatigue, headaches, numbness and weakness. Pertinent negatives include no chest pain, chills, congestion, diaphoresis, fever, nausea, sore throat or vomiting. She has tried nothing for the symptoms. The treatment provided no relief.   Hypertension   This is a chronic problem. The current episode started more than 1 year ago. The problem is unchanged. The problem is controlled. Associated symptoms include shortness of breath. Pertinent negatives include no anxiety, blurred vision, chest pain, headaches, malaise/fatigue, neck pain, orthopnea, palpitations, PND or sweats. There are no known risk factors for coronary artery disease. Past treatments include beta blockers and calcium channel blockers. Current antihypertension treatment includes beta blockers and calcium channel blockers. The current treatment provides no improvement. There are no compliance problems.  There is no history of angina, kidney disease, CAD/MI, CVA, heart failure, left ventricular hypertrophy, PVD or retinopathy. There is no history of chronic renal disease, coarctation of the aorta, hyperaldosteronism, hypercortisolism, hyperparathyroidism, a hypertension causing med, pheochromocytoma, renovascular disease, sleep apnea or a thyroid problem.     Review of Systems  Review of Systems   Constitutional: Positive for activity change, fatigue and malaise/fatigue. Negative for appetite change, chills, diaphoresis, fever and weight loss.   HENT: Negative for congestion, ear pain, hoarse voice, postnasal drip, rhinorrhea, sinus pressure, sinus pain, sneezing, sore throat, trouble swallowing and voice change.    Respiratory: Positive for cough and shortness of breath. Negative for hemoptysis, sputum production, choking, chest tightness, wheezing and stridor.    Cardiovascular: Negative for chest pain, dyspnea on exertion and PND.   Gastrointestinal: Negative for diarrhea, heartburn, nausea and vomiting.    Musculoskeletal: Positive for arthralgias and back pain. Negative for myalgias.   Neurological: Positive for weakness and numbness. Negative for headaches.       Patient Active Problem List   Diagnosis   • Lower abdominal pain   • Closed compression fracture of thoracic vertebra (HCC)   • Osteoporosis   • Migraine   • General medical examination   • Chronic thoracic back pain   • Tobacco user   • Acute hip pain, left   • Osteoarthritis of both hips   • Lumbar spine pain   • Trochanteric bursitis of left hip   • Annual physical exam   • Tobacco abuse counseling   • Chronic low back pain   • Essential hypertension   • Hyperlipidemia   • Chronic bilateral thoracic back pain   • Severe protein-calorie malnutrition (HCC)   • Lymphocytosis   • Stage 2 chronic kidney disease   • Allergic rhinitis   • Unspecified severe protein-calorie malnutrition (HCC)   • Underweight   • Chronic bronchitis (HCC)   • COPD with exacerbation (HCC)   • Chronic midline thoracic back pain   • Age related osteoporosis   • Chronic obstructive pulmonary disease (HCC)   • Community acquired pneumonia     Past Surgical History:   Procedure Laterality Date   • HYSTERECTOMY     • TONSILLECTOMY       Social History     Socioeconomic History   • Marital status:    Tobacco Use   • Smoking status: Every Day   • Smokeless tobacco: Never   Vaping Use   • Vaping Use: Never used   Substance and Sexual Activity   • Alcohol use: Yes     Comment: social   • Drug use: No   • Sexual activity: Defer     Family History   Problem Relation Age of Onset   • Alcohol abuse Other    • Asthma Other    • Breast cancer Other    • Diabetes Other    • Glaucoma Other    • Heart disease Other    • Hypertension Other    • Stroke Other      No visits with results within 6 Month(s) from this visit.   Latest known visit with results is:   Lab on 06/09/2022   Component Date Value Ref Range Status   • Glucose 06/09/2022 153 (H)  65 - 99 mg/dL Final   • BUN 06/09/2022 15  8 -  23 mg/dL Final   • Creatinine 06/09/2022 0.91  0.57 - 1.00 mg/dL Final   • Sodium 06/09/2022 137  136 - 145 mmol/L Final   • Potassium 06/09/2022 3.8  3.5 - 5.2 mmol/L Final   • Chloride 06/09/2022 101  98 - 107 mmol/L Final   • CO2 06/09/2022 23.5  22.0 - 29.0 mmol/L Final   • Calcium 06/09/2022 9.3  8.6 - 10.5 mg/dL Final   • Total Protein 06/09/2022 6.7  6.0 - 8.5 g/dL Final   • Albumin 06/09/2022 3.60  3.50 - 5.20 g/dL Final   • ALT (SGPT) 06/09/2022 12  1 - 33 U/L Final   • AST (SGOT) 06/09/2022 14  1 - 32 U/L Final   • Alkaline Phosphatase 06/09/2022 101  39 - 117 U/L Final   • Total Bilirubin 06/09/2022 0.2  0.0 - 1.2 mg/dL Final   • Globulin 06/09/2022 3.1  gm/dL Final   • A/G Ratio 06/09/2022 1.2  g/dL Final   • BUN/Creatinine Ratio 06/09/2022 16.5  7.0 - 25.0 Final   • Anion Gap 06/09/2022 12.5  5.0 - 15.0 mmol/L Final   • eGFR 06/09/2022 69.3  >60.0 mL/min/1.73 Final    National Kidney Foundation and American Society of Nephrology (ASN) Task Force recommended calculation based on the Chronic Kidney Disease Epidemiology Collaboration (CKD-EPI) equation refit without adjustment for race.   • C-Reactive Protein 06/09/2022 <0.30  0.00 - 0.50 mg/dL Final   • proBNP 06/09/2022 184.0  0.0 - 900.0 pg/mL Final   • WBC 06/09/2022 15.55 (H)  3.40 - 10.80 10*3/mm3 Final   • RBC 06/09/2022 4.06  3.77 - 5.28 10*6/mm3 Final   • Hemoglobin 06/09/2022 13.0  12.0 - 15.9 g/dL Final   • Hematocrit 06/09/2022 37.7  34.0 - 46.6 % Final   • MCV 06/09/2022 92.9  79.0 - 97.0 fL Final   • MCH 06/09/2022 32.0  26.6 - 33.0 pg Final   • MCHC 06/09/2022 34.5  31.5 - 35.7 g/dL Final   • RDW 06/09/2022 12.7  12.3 - 15.4 % Final   • RDW-SD 06/09/2022 42.9  37.0 - 54.0 fl Final   • MPV 06/09/2022 11.6  6.0 - 12.0 fL Final   • Platelets 06/09/2022 365  140 - 450 10*3/mm3 Final   • Neutrophil % 06/09/2022 81.0 (H)  42.7 - 76.0 % Final   • Lymphocyte % 06/09/2022 14.2 (L)  19.6 - 45.3 % Final   • Monocyte % 06/09/2022 4.1 (L)  5.0 - 12.0 %  "Final   • Eosinophil % 06/09/2022 0.0 (L)  0.3 - 6.2 % Final   • Basophil % 06/09/2022 0.1  0.0 - 1.5 % Final   • Immature Grans % 06/09/2022 0.6 (H)  0.0 - 0.5 % Final   • Neutrophils, Absolute 06/09/2022 12.60 (H)  1.70 - 7.00 10*3/mm3 Final   • Lymphocytes, Absolute 06/09/2022 2.21  0.70 - 3.10 10*3/mm3 Final   • Monocytes, Absolute 06/09/2022 0.64  0.10 - 0.90 10*3/mm3 Final   • Eosinophils, Absolute 06/09/2022 0.00  0.00 - 0.40 10*3/mm3 Final   • Basophils, Absolute 06/09/2022 0.01  0.00 - 0.20 10*3/mm3 Final   • Immature Grans, Absolute 06/09/2022 0.09 (H)  0.00 - 0.05 10*3/mm3 Final   • nRBC 06/09/2022 0.0  0.0 - 0.2 /100 WBC Final      XR Chest PA & Lateral  Narrative: TWO VIEW CHEST    HISTORY: Dyspnea. COPD. Pneumonia. Acute exacerbation of COPD.    Frontal and lateral films of the chest were obtained.    COMPARISON: May 26, 2022    FINDINGS:    Partial clearing of the right lower lobe infiltrate and  atelectasis.  The heart is not enlarged.  The pulmonary vasculature is not increased.  No pleural effusion.  No pneumothorax.  Osteoporosis.  Old moderate compression deformity lower thoracic spine.  Impression: CONCLUSION:  Partial clearing of the right lower lobe infiltrate and  atelectasis.    [id number]    Electronically signed by:  Luke Elliott MD  6/9/2022 8:52 PM CDT  Workstation: [id number]    @[employer]@  Immunization History   Administered Date(s) Administered   • Fluad Quad 65+ 10/14/2020   • Fluzone High-Dose 65+yrs 11/23/2021   • Pneumococcal Polysaccharide (PPSV23) 08/31/2021       The following portions of the patient's history were reviewed and updated as appropriate: allergies, current medications, past family history, past medical history, past social history, past surgical history and problem list.        Physical Exam  /56 (BP Location: Right arm, Patient Position: Sitting, Cuff Size: Adult)   Pulse 70   Temp 97.9 °F (36.6 °C)   Ht 157.5 cm (62\")   Wt 41.6 kg (91 lb 12.8 oz)   SpO2 " 96%   BMI 16.79 kg/m²     Physical Exam  Vitals and nursing note reviewed.   Constitutional:       Appearance: She is well-developed. She is not diaphoretic.   HENT:      Head: Normocephalic and atraumatic.      Right Ear: External ear normal.   Eyes:      Conjunctiva/sclera: Conjunctivae normal.      Pupils: Pupils are equal, round, and reactive to light.   Cardiovascular:      Rate and Rhythm: Normal rate and regular rhythm.      Heart sounds: Normal heart sounds. No murmur heard.  Pulmonary:      Effort: Respiratory distress present.      Comments: Decreased breath sounds   Abdominal:      General: Bowel sounds are normal. There is no distension.      Palpations: Abdomen is soft.      Tenderness: There is no abdominal tenderness.   Musculoskeletal:         General: Tenderness present. No deformity. Normal range of motion.      Cervical back: Normal range of motion and neck supple.   Skin:     General: Skin is warm.      Coloration: Skin is not pale.      Findings: No erythema or rash.   Neurological:      Mental Status: She is alert and oriented to person, place, and time.      Cranial Nerves: No cranial nerve deficit.   Psychiatric:         Behavior: Behavior normal.         [unfilled]   Diagnosis Plan   1. Chronic obstructive pulmonary disease, unspecified COPD type (HCC)  CBC Auto Differential    Comprehensive Metabolic Panel    Hemoglobin A1c    Lipid Panel    TSH    T4, Free    Vitamin D,25-Hydroxy    CT Chest Low Dose Follow Up With Contrast      2. Tobacco user  CBC Auto Differential    Comprehensive Metabolic Panel    Hemoglobin A1c    Lipid Panel    TSH    T4, Free    Vitamin D,25-Hydroxy    CT Chest Low Dose Follow Up With Contrast      3. Underweight  CBC Auto Differential    Comprehensive Metabolic Panel    Hemoglobin A1c    Lipid Panel    TSH    T4, Free    Vitamin D,25-Hydroxy    CT Chest Low Dose Follow Up With Contrast      4. Stage 2 chronic kidney disease  CBC Auto Differential     Comprehensive Metabolic Panel    Hemoglobin A1c    Lipid Panel    TSH    T4, Free    Vitamin D,25-Hydroxy    CT Chest Low Dose Follow Up With Contrast      5. Severe protein-calorie malnutrition (HCC)  CBC Auto Differential    Comprehensive Metabolic Panel    Hemoglobin A1c    Lipid Panel    TSH    T4, Free    Vitamin D,25-Hydroxy    CT Chest Low Dose Follow Up With Contrast      6. Age-related osteoporosis without current pathological fracture  CBC Auto Differential    Comprehensive Metabolic Panel    Hemoglobin A1c    Lipid Panel    TSH    T4, Free    Vitamin D,25-Hydroxy    CT Chest Low Dose Follow Up With Contrast    Mammo Screening Bilateral With CAD    DEXA Bone Density Axial      7. Essential hypertension  CBC Auto Differential    Comprehensive Metabolic Panel    Hemoglobin A1c    Lipid Panel    TSH    T4, Free    Vitamin D,25-Hydroxy    CT Chest Low Dose Follow Up With Contrast      8. Mixed hyperlipidemia  CBC Auto Differential    Comprehensive Metabolic Panel    Hemoglobin A1c    Lipid Panel    TSH    T4, Free    Vitamin D,25-Hydroxy    CT Chest Low Dose Follow Up With Contrast      9. Tobacco abuse counseling  CBC Auto Differential    Comprehensive Metabolic Panel    Hemoglobin A1c    Lipid Panel    TSH    T4, Free    Vitamin D,25-Hydroxy    CT Chest Low Dose Follow Up With Contrast      10. Encounter for screening for endocrine disorder  CBC Auto Differential    Comprehensive Metabolic Panel    Hemoglobin A1c    Lipid Panel    TSH    T4, Free    Vitamin D,25-Hydroxy    CT Chest Low Dose Follow Up With Contrast      11. Encounter for screening for diabetes mellitus  CBC Auto Differential    Comprehensive Metabolic Panel    Hemoglobin A1c    Lipid Panel    TSH    T4, Free    Vitamin D,25-Hydroxy    CT Chest Low Dose Follow Up With Contrast      12. Screening for lung cancer  CBC Auto Differential    Comprehensive Metabolic Panel    Hemoglobin A1c    Lipid Panel    TSH    T4, Free    Vitamin D,25-Hydroxy     CT Chest Low Dose Follow Up With Contrast      13. Abnormal finding of blood chemistry, unspecified  Hemoglobin A1c      14. Screening mammogram, encounter for  Mammo Screening Bilateral With CAD    DEXA Bone Density Axial               -recommend labwork    -recommend colonoscopy  pt declined.   -recommend COVID-19 vaccination   -recommend lung cancer screening -schedule at imaging center. Pt had positive cologuard   -recommend influenza vaccination  -recommend mammogram  screening-schedule at imaging center   -recommend DEXA scan  - schedule at imaging center   -COPD/COPD exacerbation/ dyspnea- recomemnd Pulmonology referral on Anoro 1 puff daily.  On albuterol PRN and nebulizers   -migraine headaches - on elavil 50 mg at bedtime on propranolol 40 mg gdaily.   -underweight/moderate protein malnutrition - recommend high calorie diet. BMI at 16.79. recommend high calorie diet, recommend smoking cesation   -CKD stage 2 - gave information. Continue to monitor   -allergic rhinitis - on flonase nasal spray   -HTN -  controlled on norvasc 10 mg daily.   propranolol 50 mg PO q daily.   -HLP on simvastatin 20 mg Po qhs   -old compression fracture of thoracic spine - referred to Spine Surgeon but surgery not indicated   on elavil 50 mg PO qhs  Recommend  Pain management but pt declined. Try tylenol 3 with codeine. Gave 30 pills   - osteoporosis - pt already on citracal with Vitamin D.r ecommend prolia but pt declined.  Recommend repeat DEXA scan  Was on fosamax but has been on this for years. prolia injection declined by insurance. Recommend bone clinic but pt declined   - tobacco user advised pt to quit smoking >5 minutes.  Pt declines nicotine patches or chantix. Recommend 1 800 QUIT NOW  -advised pt to be safe and call with questions and concerns  -advised pt to go to ER or call 911 if symptoms worrisome or severe  -advised pt to followup with specialist and referrals   -advised pt to be safe during COVID-19 pandemic   I  spent 30 minutes caring for Jenn on this date of service. This time includes time spent by me in the following activities: preparing for the visit, reviewing tests, obtaining and/or reviewing a separately obtained history, performing a medically appropriate examination and/or evaluation, counseling and educating the patient/family/caregiver, ordering medications, tests, or procedures, referring and communicating with other health care professionals, documenting information in the medical record, independently interpreting results and communicating that information with the patient/family/caregiver and care coordination  -recheck in 3 months         This document has been electronically signed by Diego Gusman MD on February 16, 2023 08:32 CST

## 2023-02-16 ENCOUNTER — OFFICE VISIT (OUTPATIENT)
Dept: FAMILY MEDICINE CLINIC | Facility: CLINIC | Age: 68
End: 2023-02-16
Payer: MEDICARE

## 2023-02-16 VITALS
SYSTOLIC BLOOD PRESSURE: 110 MMHG | BODY MASS INDEX: 16.89 KG/M2 | WEIGHT: 91.8 LBS | DIASTOLIC BLOOD PRESSURE: 56 MMHG | HEART RATE: 70 BPM | HEIGHT: 62 IN | TEMPERATURE: 97.9 F | OXYGEN SATURATION: 96 %

## 2023-02-16 DIAGNOSIS — I10 ESSENTIAL HYPERTENSION: ICD-10-CM

## 2023-02-16 DIAGNOSIS — Z13.29 ENCOUNTER FOR SCREENING FOR ENDOCRINE DISORDER: ICD-10-CM

## 2023-02-16 DIAGNOSIS — M81.0 AGE-RELATED OSTEOPOROSIS WITHOUT CURRENT PATHOLOGICAL FRACTURE: ICD-10-CM

## 2023-02-16 DIAGNOSIS — Z12.31 SCREENING MAMMOGRAM, ENCOUNTER FOR: ICD-10-CM

## 2023-02-16 DIAGNOSIS — Z71.6 TOBACCO ABUSE COUNSELING: ICD-10-CM

## 2023-02-16 DIAGNOSIS — E78.2 MIXED HYPERLIPIDEMIA: ICD-10-CM

## 2023-02-16 DIAGNOSIS — N18.2 STAGE 2 CHRONIC KIDNEY DISEASE: ICD-10-CM

## 2023-02-16 DIAGNOSIS — Z13.1 ENCOUNTER FOR SCREENING FOR DIABETES MELLITUS: ICD-10-CM

## 2023-02-16 DIAGNOSIS — R79.9 ABNORMAL FINDING OF BLOOD CHEMISTRY, UNSPECIFIED: ICD-10-CM

## 2023-02-16 DIAGNOSIS — Z12.2 SCREENING FOR LUNG CANCER: ICD-10-CM

## 2023-02-16 DIAGNOSIS — J44.9 CHRONIC OBSTRUCTIVE PULMONARY DISEASE, UNSPECIFIED COPD TYPE: Primary | ICD-10-CM

## 2023-02-16 DIAGNOSIS — Z72.0 TOBACCO USER: ICD-10-CM

## 2023-02-16 DIAGNOSIS — E43 SEVERE PROTEIN-CALORIE MALNUTRITION: ICD-10-CM

## 2023-02-16 DIAGNOSIS — R63.6 UNDERWEIGHT: ICD-10-CM

## 2023-02-16 PROCEDURE — 99214 OFFICE O/P EST MOD 30 MIN: CPT | Performed by: FAMILY MEDICINE

## 2023-02-16 RX ORDER — ALBUTEROL SULFATE 1.25 MG/3ML
1 SOLUTION RESPIRATORY (INHALATION) EVERY 6 HOURS PRN
Qty: 360 ML | Refills: 3 | Status: SHIPPED | OUTPATIENT
Start: 2023-02-16

## 2023-02-16 RX ORDER — PROPRANOLOL HYDROCHLORIDE 40 MG/1
40 TABLET ORAL DAILY
Qty: 90 TABLET | Refills: 1 | Status: SHIPPED | OUTPATIENT
Start: 2023-02-16

## 2023-02-16 RX ORDER — MONTELUKAST SODIUM 10 MG/1
10 TABLET ORAL
Qty: 90 TABLET | Refills: 1 | Status: SHIPPED | OUTPATIENT
Start: 2023-02-16

## 2023-02-16 RX ORDER — AMLODIPINE BESYLATE 10 MG/1
10 TABLET ORAL DAILY
Qty: 90 TABLET | Refills: 1 | Status: SHIPPED | OUTPATIENT
Start: 2023-02-16

## 2023-02-16 RX ORDER — ALBUTEROL SULFATE 90 UG/1
2 AEROSOL, METERED RESPIRATORY (INHALATION) EVERY 4 HOURS PRN
Qty: 18 G | Refills: 1 | Status: SHIPPED | OUTPATIENT
Start: 2023-02-16

## 2023-02-16 RX ORDER — FLUTICASONE PROPIONATE 50 MCG
1 SPRAY, SUSPENSION (ML) NASAL DAILY
Qty: 16 G | Refills: 3 | Status: SHIPPED | OUTPATIENT
Start: 2023-02-16

## 2023-02-16 RX ORDER — AMITRIPTYLINE HYDROCHLORIDE 25 MG/1
50 TABLET, FILM COATED ORAL NIGHTLY
Qty: 180 TABLET | Refills: 1 | Status: SHIPPED | OUTPATIENT
Start: 2023-02-16

## 2023-02-27 ENCOUNTER — LAB (OUTPATIENT)
Dept: LAB | Facility: HOSPITAL | Age: 68
End: 2023-02-27
Payer: MEDICARE

## 2023-02-27 DIAGNOSIS — N18.2 STAGE 2 CHRONIC KIDNEY DISEASE: ICD-10-CM

## 2023-02-27 DIAGNOSIS — R63.6 UNDERWEIGHT: ICD-10-CM

## 2023-02-27 DIAGNOSIS — M81.0 AGE-RELATED OSTEOPOROSIS WITHOUT CURRENT PATHOLOGICAL FRACTURE: ICD-10-CM

## 2023-02-27 DIAGNOSIS — Z71.6 TOBACCO ABUSE COUNSELING: ICD-10-CM

## 2023-02-27 DIAGNOSIS — Z13.29 ENCOUNTER FOR SCREENING FOR ENDOCRINE DISORDER: ICD-10-CM

## 2023-02-27 DIAGNOSIS — Z72.0 TOBACCO USER: ICD-10-CM

## 2023-02-27 DIAGNOSIS — J44.9 CHRONIC OBSTRUCTIVE PULMONARY DISEASE, UNSPECIFIED COPD TYPE: ICD-10-CM

## 2023-02-27 DIAGNOSIS — E78.2 MIXED HYPERLIPIDEMIA: ICD-10-CM

## 2023-02-27 DIAGNOSIS — I10 ESSENTIAL HYPERTENSION: ICD-10-CM

## 2023-02-27 DIAGNOSIS — Z13.1 ENCOUNTER FOR SCREENING FOR DIABETES MELLITUS: ICD-10-CM

## 2023-02-27 DIAGNOSIS — Z12.2 SCREENING FOR LUNG CANCER: ICD-10-CM

## 2023-02-27 DIAGNOSIS — E43 SEVERE PROTEIN-CALORIE MALNUTRITION: ICD-10-CM

## 2023-02-27 DIAGNOSIS — R79.9 ABNORMAL FINDING OF BLOOD CHEMISTRY, UNSPECIFIED: ICD-10-CM

## 2023-02-27 LAB
25(OH)D3 SERPL-MCNC: 23.7 NG/ML (ref 30–100)
ALBUMIN SERPL-MCNC: 4 G/DL (ref 3.5–5.2)
ALBUMIN/GLOB SERPL: 1.2 G/DL
ALP SERPL-CCNC: 157 U/L (ref 39–117)
ALT SERPL W P-5'-P-CCNC: 10 U/L (ref 1–33)
ANION GAP SERPL CALCULATED.3IONS-SCNC: 12 MMOL/L (ref 5–15)
AST SERPL-CCNC: 13 U/L (ref 1–32)
BASOPHILS # BLD AUTO: 0.08 10*3/MM3 (ref 0–0.2)
BASOPHILS NFR BLD AUTO: 0.6 % (ref 0–1.5)
BILIRUB SERPL-MCNC: 0.2 MG/DL (ref 0–1.2)
BUN SERPL-MCNC: 9 MG/DL (ref 8–23)
BUN/CREAT SERPL: 11.8 (ref 7–25)
CALCIUM SPEC-SCNC: 9.9 MG/DL (ref 8.6–10.5)
CHLORIDE SERPL-SCNC: 102 MMOL/L (ref 98–107)
CHOLEST SERPL-MCNC: 153 MG/DL (ref 0–200)
CO2 SERPL-SCNC: 25 MMOL/L (ref 22–29)
CREAT SERPL-MCNC: 0.76 MG/DL (ref 0.57–1)
DEPRECATED RDW RBC AUTO: 41 FL (ref 37–54)
EGFRCR SERPLBLD CKD-EPI 2021: 85.5 ML/MIN/1.73
EOSINOPHIL # BLD AUTO: 0.25 10*3/MM3 (ref 0–0.4)
EOSINOPHIL NFR BLD AUTO: 1.7 % (ref 0.3–6.2)
ERYTHROCYTE [DISTWIDTH] IN BLOOD BY AUTOMATED COUNT: 12.3 % (ref 12.3–15.4)
GLOBULIN UR ELPH-MCNC: 3.3 GM/DL
GLUCOSE SERPL-MCNC: 78 MG/DL (ref 65–99)
HBA1C MFR BLD: 5.3 % (ref 4.8–5.6)
HCT VFR BLD AUTO: 40.9 % (ref 34–46.6)
HDLC SERPL-MCNC: 49 MG/DL (ref 40–60)
HGB BLD-MCNC: 13.8 G/DL (ref 12–15.9)
IMM GRANULOCYTES # BLD AUTO: 0.08 10*3/MM3 (ref 0–0.05)
IMM GRANULOCYTES NFR BLD AUTO: 0.6 % (ref 0–0.5)
LDLC SERPL CALC-MCNC: 83 MG/DL (ref 0–100)
LDLC/HDLC SERPL: 1.63 {RATIO}
LYMPHOCYTES # BLD AUTO: 3.24 10*3/MM3 (ref 0.7–3.1)
LYMPHOCYTES NFR BLD AUTO: 22.3 % (ref 19.6–45.3)
MCH RBC QN AUTO: 31.3 PG (ref 26.6–33)
MCHC RBC AUTO-ENTMCNC: 33.7 G/DL (ref 31.5–35.7)
MCV RBC AUTO: 92.7 FL (ref 79–97)
MONOCYTES # BLD AUTO: 1.01 10*3/MM3 (ref 0.1–0.9)
MONOCYTES NFR BLD AUTO: 7 % (ref 5–12)
NEUTROPHILS NFR BLD AUTO: 67.8 % (ref 42.7–76)
NEUTROPHILS NFR BLD AUTO: 9.86 10*3/MM3 (ref 1.7–7)
NRBC BLD AUTO-RTO: 0 /100 WBC (ref 0–0.2)
PLATELET # BLD AUTO: 306 10*3/MM3 (ref 140–450)
PMV BLD AUTO: 11.5 FL (ref 6–12)
POTASSIUM SERPL-SCNC: 4.3 MMOL/L (ref 3.5–5.2)
PROT SERPL-MCNC: 7.3 G/DL (ref 6–8.5)
RBC # BLD AUTO: 4.41 10*6/MM3 (ref 3.77–5.28)
SODIUM SERPL-SCNC: 139 MMOL/L (ref 136–145)
T4 FREE SERPL-MCNC: 1.27 NG/DL (ref 0.93–1.7)
TRIGL SERPL-MCNC: 120 MG/DL (ref 0–150)
TSH SERPL DL<=0.05 MIU/L-ACNC: 2.74 UIU/ML (ref 0.27–4.2)
VLDLC SERPL-MCNC: 21 MG/DL (ref 5–40)
WBC NRBC COR # BLD: 14.52 10*3/MM3 (ref 3.4–10.8)

## 2023-02-27 PROCEDURE — 83036 HEMOGLOBIN GLYCOSYLATED A1C: CPT

## 2023-02-27 PROCEDURE — 80053 COMPREHEN METABOLIC PANEL: CPT

## 2023-02-27 PROCEDURE — 84439 ASSAY OF FREE THYROXINE: CPT

## 2023-02-27 PROCEDURE — 80061 LIPID PANEL: CPT

## 2023-02-27 PROCEDURE — 82306 VITAMIN D 25 HYDROXY: CPT

## 2023-02-27 PROCEDURE — 84443 ASSAY THYROID STIM HORMONE: CPT

## 2023-02-27 PROCEDURE — 85025 COMPLETE CBC W/AUTO DIFF WBC: CPT

## 2023-03-01 ENCOUNTER — TELEPHONE (OUTPATIENT)
Dept: FAMILY MEDICINE CLINIC | Facility: CLINIC | Age: 68
End: 2023-03-01
Payer: MEDICARE

## 2023-03-01 NOTE — TELEPHONE ENCOUNTER
----- Message from Diego Gusman MD sent at 2/27/2023  8:47 PM CST -----  Please call pt    Vitamin D is low continue on vitamin D supplement    On CBC WBC high at 14.52 with elevations of neutrophil count lymphocytes and monocytes. I recommend pt see Hematology/Oncology for this in [place] or [facility]. Let me know what pt decides. I have referred to Hematology in the past     On CMP alkaline phosphatase high likely due to osteoporosis. Could possibly be due to liver issues.  Recommend US of liver to rule out fatty liver if pt agreeable I will order     Rest of labwork stable     Recheck on next visit thanks

## 2023-03-03 ENCOUNTER — TELEPHONE (OUTPATIENT)
Dept: FAMILY MEDICINE CLINIC | Facility: CLINIC | Age: 68
End: 2023-03-03

## 2023-03-03 NOTE — TELEPHONE ENCOUNTER
[facility] called and they are needing a revised order for the CT low dose. It doesn't have the smoking questions on it. Please fax to [phone]. Patient is there now

## 2023-03-06 DIAGNOSIS — Z12.2 SCREENING FOR LUNG CANCER: Primary | ICD-10-CM

## 2023-03-06 DIAGNOSIS — F17.219 CIGARETTE NICOTINE DEPENDENCE WITH NICOTINE-INDUCED DISORDER: ICD-10-CM

## 2023-03-15 ENCOUNTER — TELEPHONE (OUTPATIENT)
Dept: FAMILY MEDICINE CLINIC | Facility: CLINIC | Age: 68
End: 2023-03-15
Payer: MEDICARE

## 2023-03-15 NOTE — TELEPHONE ENCOUNTER
Gave pt results and recommendations. She voiced understanding and stated she would think about it and let us know what she decides.

## 2023-03-15 NOTE — TELEPHONE ENCOUNTER
----- Message from Diego Gusman MD sent at 3/15/2023  7:34 AM CDT -----  Regarding: CT of chest lung cancer screening  Please let pt know that CT of chest on 3/13/23 shows in right lung there are areas of scarring and no worrisome pulmonary nodules or mass    However on left lung there is a new 3 mm pulmonary nodule in the left upper lobe along with 8 mm pulmonary nodule in left lower lobe and 6 mm pulmonary nodule in inferior left upper lobe     There is a small hiatal hernia present     There are vascular calcifications in the thoracic aorta and dense coronary artery calcifications in heart.  Pulmonary arteries are enlarged which can be seen with Pulmonary hypertension.  Pt also has moderate emphysema    There is an enlarged pretracheal lymph node measuring 11 mm in size.      There are degenerative changes including compression fracture of T12 seen on previous CT in 2021    It is recommended pt have repeat CT in 3 months to assess stability of left pulmonary nodules and also pretracheal lymph node I also recommend pt see Pulmonology  for lung nodules, emphysema and let me know if she would like [facility] referral    I also recommend pt see Cardiology if pt is having increasing shortness of breath and/or chest pain regarding the coronary arteries along with Pulmonary hypertension  Let me know what pt decides    Recheck on next visit thanks

## 2023-03-21 DIAGNOSIS — N18.2 STAGE 2 CHRONIC KIDNEY DISEASE: ICD-10-CM

## 2023-03-21 DIAGNOSIS — J44.9 CHRONIC OBSTRUCTIVE PULMONARY DISEASE, UNSPECIFIED COPD TYPE: ICD-10-CM

## 2023-03-21 DIAGNOSIS — Z71.6 TOBACCO ABUSE COUNSELING: ICD-10-CM

## 2023-03-21 DIAGNOSIS — E43 SEVERE PROTEIN-CALORIE MALNUTRITION: ICD-10-CM

## 2023-03-21 DIAGNOSIS — R63.6 UNDERWEIGHT: ICD-10-CM

## 2023-03-21 DIAGNOSIS — Z12.2 SCREENING FOR LUNG CANCER: ICD-10-CM

## 2023-03-21 DIAGNOSIS — M81.0 AGE-RELATED OSTEOPOROSIS WITHOUT CURRENT PATHOLOGICAL FRACTURE: ICD-10-CM

## 2023-03-21 DIAGNOSIS — Z13.1 ENCOUNTER FOR SCREENING FOR DIABETES MELLITUS: ICD-10-CM

## 2023-03-21 DIAGNOSIS — E78.2 MIXED HYPERLIPIDEMIA: ICD-10-CM

## 2023-03-21 DIAGNOSIS — Z13.29 ENCOUNTER FOR SCREENING FOR ENDOCRINE DISORDER: ICD-10-CM

## 2023-03-21 DIAGNOSIS — Z72.0 TOBACCO USER: ICD-10-CM

## 2023-03-21 DIAGNOSIS — I10 ESSENTIAL HYPERTENSION: ICD-10-CM

## 2023-04-12 NOTE — PROGRESS NOTES
Subjective:  Jenn Lee is a 68 y.o. female who presents for       Patient Active Problem List   Diagnosis   • Lower abdominal pain   • Closed compression fracture of thoracic vertebra   • Osteoporosis   • Migraine   • General medical examination   • Chronic thoracic back pain   • Tobacco user   • Acute hip pain, left   • Osteoarthritis of both hips   • Lumbar spine pain   • Trochanteric bursitis of left hip   • Annual physical exam   • Tobacco abuse counseling   • Chronic low back pain   • Essential hypertension   • Hyperlipidemia   • Chronic bilateral thoracic back pain   • Severe protein-calorie malnutrition   • Lymphocytosis   • Stage 2 chronic kidney disease   • Allergic rhinitis   • Unspecified severe protein-calorie malnutrition   • Underweight   • Chronic bronchitis   • COPD with exacerbation   • Chronic midline thoracic back pain   • Age related osteoporosis   • Chronic obstructive pulmonary disease   • Community acquired pneumonia   • Pulmonary hypertension   • Coronary artery disease involving native coronary artery of native heart   • Neutrophilia   • Lymphadenopathy of head and neck region           Current Outpatient Medications:   •  albuterol (ACCUNEB) 1.25 MG/3ML nebulizer solution, Take 3 mL by nebulization Every 6 (Six) Hours As Needed for Wheezing or Shortness of Air., Disp: 360 mL, Rfl: 3  •  albuterol sulfate  (90 Base) MCG/ACT inhaler, Inhale 2 puffs Every 4 (Four) Hours As Needed for Wheezing or Shortness of Air., Disp: 18 g, Rfl: 1  •  amitriptyline (ELAVIL) 25 MG tablet, Take 2 tablets by mouth Every Night., Disp: 180 tablet, Rfl: 1  •  amLODIPine (NORVASC) 10 MG tablet, Take 1 tablet by mouth Daily., Disp: 90 tablet, Rfl: 1  •  fluticasone (FLONASE) 50 MCG/ACT nasal spray, 1 spray into the nostril(s) as directed by provider Daily., Disp: 16 g, Rfl: 3  •  montelukast (SINGULAIR) 10 MG tablet, Take 1 tablet by mouth every night at bedtime., Disp: 90 tablet, Rfl: 1  •   Nebulizer device, 1 application 4 (Four) Times a Day., Disp: 1 each, Rfl: 1  •  propranolol (INDERAL) 40 MG tablet, Take 1 tablet by mouth Daily., Disp: 90 tablet, Rfl: 1  •  simvastatin (Zocor) 20 MG tablet, Take 1 tablet by mouth Every Night., Disp: 90 tablet, Rfl: 3  •  aspirin 81 MG EC tablet, Take 1 tablet by mouth Daily., Disp: 30 tablet, Rfl: 3  •  Budeson-Glycopyrrol-Formoterol (BREZTRI) 160-9-4.8 MCG/ACT aerosol inhaler, Inhale 2 puffs 2 (Two) Times a Day., Disp: 10.7 g, Rfl: 3      Pt is 69 yo female with management of tobacco smoker, COPD, chronic bronchitis  osteoporosis, osteaoarthritis of both hips, multiple joint pain, HLP, HTN, chronic pain, migraine headaches, sp tonsillectomy, sp hysterectomy, compression fracture of thoracic vertebra, chronic hip pain (trochanteric bursitis of left hip), leukocytosis, CKD stage 2, leukocytosis, viamin D deficiency positive cologuard test, history of cataracts, history of skin cancer, left lung nodules ( 3mm nodule in left upper lobe and 8 mm nodule in the left lower lobe and 6 mm nodule in inferior left upper lobe. Coronary artery disease, pulmonary hypertension, GERD    6/9/22 in office visit for recheck. Pt was treated for COPD exacerbation last visit. She was given albuterol nebx, prednisone tapering dose, doxycycline. Her labwork showed on CBC WBC at 18.08 along with neturophil count at 13.44   Chest x-ray showed right lower lobe subsegmental infiltrates and atelectasis along with small right pleural effusion. She is feeling somewhat better but still has cough/congestion. No fever but diatstolic BP at 60 and HR is high at100. Pt has been going through a lot the past week. She is in the process moving.   She continues to smoke tobacco about 1/2 ppd.      2/16/23 in office visit for recheck. Pt is due for new labwork mammogram  DEXACscreening and colonoscopy screening. She continues to smoke 1/2 ppd. She is not using Anoro but is using Breo.     5/17/23 in  office visit for recheck. Pt had labwork on 2/27/23 that showed thyroid studies normal. Vitamin D was low at 23.7. lipid panel stable hga1c stable CMP showed GFR in 80s and alkaline phosphatase high hat 157.  CBC Showed WBC at >14.0 with elevation of monocytes, lymphocytes and neutrophils. CT of chest on 3/13/23 shows in right lung there are areas of scarring and no worrisome pulmonary nodules or mass. However on left lung there is a new 3 mm pulmonary nodule in the left upper lobe along with 8 mm pulmonary nodule in left lower lobe and 6 mm pulmonary nodule in inferior left upper lobe There is a small hiatal hernia present There are vascular calcifications in the thoracic aorta and dense coronary artery calcifications in heart.  Pulmonary arteries are enlarged which can be seen with Pulmonary hypertension.  Pt also has moderate emphysemaThere is an enlarged pretracheal lymph node measuring 11 mm in size.  There are degenerative changes including compression fracture of T12 seen on previous CT in 2021. It is recommended pt have repeat CT in 3 months to assess stability of left pulmonary nodules and also pretracheal lymph node I recommended pt see Pulmonology  for lung nodules, emphysema. Pt gained 1 lbs since her last visit.  Pt has been having chest pain for past 2 weeks that daily. It radiates all over her chest. She has no dizziness. Breathing is stable. No syncope.  She continues to smoke about 1 ppd tobacco. She reports no fever or night sweats.  She could not tolerate anoro and is back on Breo.       Coronary Artery Disease  Presents for follow-up visit. Symptoms include shortness of breath. Pertinent negatives include no chest pain, chest pressure, chest tightness, dizziness, leg swelling, muscle weakness, palpitations or weight gain. Risk factors include premature CAD in family, hyperlipidemia and hypertension. There is no history of arrhythmia, CHF or past myocardial infarction. The symptoms have been  stable. Compliance with diet is poor. Compliance with exercise is poor. Compliance with medications is poor.   Chest Pain   This is a recurrent problem. The current episode started more than 1 month ago. The onset quality is gradual. The problem occurs constantly. The problem has been unchanged. The pain is present in the substernal region and lateral region. The pain is at a severity of 0/10. The patient is experiencing no pain. The pain radiates to the left arm and mid back. Associated symptoms include back pain, numbness, shortness of breath and weakness. Pertinent negatives include no claudication, cough, diaphoresis, dizziness, hemoptysis, irregular heartbeat, leg pain, lower extremity edema, malaise/fatigue, nausea, orthopnea, palpitations, PND, sputum production, syncope or vomiting. The pain is aggravated by nothing. She has tried nothing for the symptoms. The treatment provided no relief.   Her past medical history is significant for CAD, COPD, hyperlipidemia and hypertension.   Pertinent negatives for past medical history include no aneurysm, no anxiety/panic attacks, no aortic aneurysm, no aortic dissection, no arrhythmia, no cancer, no congenital heart disease, no connective tissue disease, no CHF, no diabetes, no DVT, no hyperhomocysteinemia, no Marfan's syndrome, no MI, no mitral valve prolapse, no muscle weakness, no pacemaker, no PE, no PVD, no recent injury, no rheumatic fever, no seizures, no sickle cell disease, no sleep apnea, no spontaneous pneumothorax, no stimulant use, no strokes, no thyroid problem and no TIA.   Her family medical history is significant for CAD, diabetes, heart disease, hyperlipidemia and hypertension.   Pertinent negatives for family medical history include: no aortic dissection, no connective tissue disease, no Marfan's syndrome, no early MI, no PE, no PVD, no sickle cell disease, no stroke, no sudden death and no TIA. Prior diagnostic workup includes chest x-ray.       Shortness of Breath  This is a chronic problem. The problem occurs constantly. The problem has been unchanged. Pertinent negatives include no abdominal pain, chest pain, claudication, coryza, ear pain, fever, headaches, hemoptysis, leg pain, leg swelling, neck pain, orthopnea, PND, rash, rhinorrhea, sore throat, sputum production, swollen glands, syncope, vomiting or wheezing. Nothing aggravates the symptoms. Risk factors include smoking. She has tried beta agonist inhalers for the symptoms. The treatment provided mild relief. Her past medical history is significant for chronic lung disease and COPD. There is no history of allergies, aspirin allergies, asthma, bronchiolitis, CAD, DVT, a heart failure, PE, pneumonia or a recent surgery.   COPD  She complains of shortness of breath. There is no hemoptysis, sputum production or wheezing. This is a chronic problem. The current episode started more than 1 year ago. The problem occurs constantly. The problem has been gradually worsening. Associated symptoms include dyspnea on exertion. Pertinent negatives include no appetite change, chest pain, ear congestion, ear pain, fever, headaches, malaise/fatigue, myalgias, nasal congestion, orthopnea, PND, postnasal drip, rhinorrhea, sneezing, sore throat, sweats, trouble swallowing or weight loss. Her symptoms are aggravated by strenuous activity. Her symptoms are alleviated by beta-agonist. She reports moderate improvement on treatment. Her symptoms are not alleviated by beta-agonist. Risk factors for lung disease include smoking/tobacco exposure. Her past medical history is significant for COPD. There is no history of asthma, bronchiectasis, bronchitis, emphysema or pneumonia.   Hyperlipidemia  This is a chronic problem. The current episode started more than 1 year ago. The problem is controlled. Recent lipid tests were reviewed and are variable. She has no history of chronic renal disease, diabetes, hypothyroidism, liver  disease, obesity or nephrotic syndrome. Associated symptoms include shortness of breath. Pertinent negatives include no chest pain. Current antihyperlipidemic treatment includes statins. The current treatment provides moderate improvement of lipids. There are no compliance problems.    Chronic Kidney Disease  This is a chronic problem. The current episode started more than 1 year ago. The problem occurs constantly. The problem has been unchanged. Associated symptoms include arthralgias, coughing, fatigue, headaches, numbness and weakness. Pertinent negatives include no chest pain, chills, congestion, diaphoresis, fever, nausea, sore throat or vomiting. She has tried nothing for the symptoms. The treatment provided no relief.   Hypertension   This is a chronic problem. The current episode started more than 1 year ago. The problem is unchanged. The problem is controlled. Associated symptoms include shortness of breath. Pertinent negatives include no anxiety, blurred vision, chest pain, headaches, malaise/fatigue, neck pain, orthopnea, palpitations, PND or sweats. There are no known risk factors for coronary artery disease. Past treatments include beta blockers and calcium channel blockers. Current antihypertension treatment includes beta blockers and calcium channel blockers. The current treatment provides no improvement. There are no compliance problems.  There is no history of angina, kidney disease, CAD/MI, CVA, heart failure, left ventricular hypertrophy, PVD or retinopathy. There is no history of chronic renal disease, coarctation of the aorta, hyperaldosteronism, hypercortisolism, hyperparathyroidism, a hypertension causing med, pheochromocytoma, renovascular disease, sleep apnea or a thyroid problem.     Review of Systems  Review of Systems   Constitutional: Positive for activity change and fatigue. Negative for chills, diaphoresis, malaise/fatigue and weight gain.   HENT: Negative for congestion, sinus  pressure, sinus pain and voice change.    Respiratory: Positive for shortness of breath. Negative for cough, hemoptysis, sputum production, choking, chest tightness and stridor.    Cardiovascular: Negative for chest pain, palpitations, orthopnea, claudication, leg swelling, syncope and PND.   Gastrointestinal: Negative for diarrhea, nausea and vomiting.   Musculoskeletal: Positive for arthralgias and back pain. Negative for muscle weakness.   Neurological: Positive for weakness and numbness. Negative for dizziness and seizures.       Patient Active Problem List   Diagnosis   • Lower abdominal pain   • Closed compression fracture of thoracic vertebra   • Osteoporosis   • Migraine   • General medical examination   • Chronic thoracic back pain   • Tobacco user   • Acute hip pain, left   • Osteoarthritis of both hips   • Lumbar spine pain   • Trochanteric bursitis of left hip   • Annual physical exam   • Tobacco abuse counseling   • Chronic low back pain   • Essential hypertension   • Hyperlipidemia   • Chronic bilateral thoracic back pain   • Severe protein-calorie malnutrition   • Lymphocytosis   • Stage 2 chronic kidney disease   • Allergic rhinitis   • Unspecified severe protein-calorie malnutrition   • Underweight   • Chronic bronchitis   • COPD with exacerbation   • Chronic midline thoracic back pain   • Age related osteoporosis   • Chronic obstructive pulmonary disease   • Community acquired pneumonia   • Pulmonary hypertension   • Coronary artery disease involving native coronary artery of native heart   • Neutrophilia   • Lymphadenopathy of head and neck region     Past Surgical History:   Procedure Laterality Date   • HYSTERECTOMY     • TONSILLECTOMY       Social History     Socioeconomic History   • Marital status:    Tobacco Use   • Smoking status: Every Day   • Smokeless tobacco: Never   Vaping Use   • Vaping Use: Never used   Substance and Sexual Activity   • Alcohol use: Yes     Comment: social   •  Drug use: No   • Sexual activity: Defer     Family History   Problem Relation Age of Onset   • Alcohol abuse Other    • Asthma Other    • Breast cancer Other    • Diabetes Other    • Glaucoma Other    • Heart disease Other    • Hypertension Other    • Stroke Other      Lab on 02/27/2023   Component Date Value Ref Range Status   • WBC 02/27/2023 14.52 (H)  3.40 - 10.80 10*3/mm3 Final   • RBC 02/27/2023 4.41  3.77 - 5.28 10*6/mm3 Final   • Hemoglobin 02/27/2023 13.8  12.0 - 15.9 g/dL Final   • Hematocrit 02/27/2023 40.9  34.0 - 46.6 % Final   • MCV 02/27/2023 92.7  79.0 - 97.0 fL Final   • MCH 02/27/2023 31.3  26.6 - 33.0 pg Final   • MCHC 02/27/2023 33.7  31.5 - 35.7 g/dL Final   • RDW 02/27/2023 12.3  12.3 - 15.4 % Final   • RDW-SD 02/27/2023 41.0  37.0 - 54.0 fl Final   • MPV 02/27/2023 11.5  6.0 - 12.0 fL Final   • Platelets 02/27/2023 306  140 - 450 10*3/mm3 Final   • Neutrophil % 02/27/2023 67.8  42.7 - 76.0 % Final   • Lymphocyte % 02/27/2023 22.3  19.6 - 45.3 % Final   • Monocyte % 02/27/2023 7.0  5.0 - 12.0 % Final   • Eosinophil % 02/27/2023 1.7  0.3 - 6.2 % Final   • Basophil % 02/27/2023 0.6  0.0 - 1.5 % Final   • Immature Grans % 02/27/2023 0.6 (H)  0.0 - 0.5 % Final   • Neutrophils, Absolute 02/27/2023 9.86 (H)  1.70 - 7.00 10*3/mm3 Final   • Lymphocytes, Absolute 02/27/2023 3.24 (H)  0.70 - 3.10 10*3/mm3 Final   • Monocytes, Absolute 02/27/2023 1.01 (H)  0.10 - 0.90 10*3/mm3 Final   • Eosinophils, Absolute 02/27/2023 0.25  0.00 - 0.40 10*3/mm3 Final   • Basophils, Absolute 02/27/2023 0.08  0.00 - 0.20 10*3/mm3 Final   • Immature Grans, Absolute 02/27/2023 0.08 (H)  0.00 - 0.05 10*3/mm3 Final   • nRBC 02/27/2023 0.0  0.0 - 0.2 /100 WBC Final   • Glucose 02/27/2023 78  65 - 99 mg/dL Final   • BUN 02/27/2023 9  8 - 23 mg/dL Final   • Creatinine 02/27/2023 0.76  0.57 - 1.00 mg/dL Final   • Sodium 02/27/2023 139  136 - 145 mmol/L Final   • Potassium 02/27/2023 4.3  3.5 - 5.2 mmol/L Final   • Chloride  02/27/2023 102  98 - 107 mmol/L Final   • CO2 02/27/2023 25.0  22.0 - 29.0 mmol/L Final   • Calcium 02/27/2023 9.9  8.6 - 10.5 mg/dL Final   • Total Protein 02/27/2023 7.3  6.0 - 8.5 g/dL Final   • Albumin 02/27/2023 4.0  3.5 - 5.2 g/dL Final   • ALT (SGPT) 02/27/2023 10  1 - 33 U/L Final   • AST (SGOT) 02/27/2023 13  1 - 32 U/L Final   • Alkaline Phosphatase 02/27/2023 157 (H)  39 - 117 U/L Final   • Total Bilirubin 02/27/2023 0.2  0.0 - 1.2 mg/dL Final   • Globulin 02/27/2023 3.3  gm/dL Final   • A/G Ratio 02/27/2023 1.2  g/dL Final   • BUN/Creatinine Ratio 02/27/2023 11.8  7.0 - 25.0 Final   • Anion Gap 02/27/2023 12.0  5.0 - 15.0 mmol/L Final   • eGFR 02/27/2023 85.5  >60.0 mL/min/1.73 Final   • Hemoglobin A1C 02/27/2023 5.30  4.80 - 5.60 % Final   • Total Cholesterol 02/27/2023 153  0 - 200 mg/dL Final   • Triglycerides 02/27/2023 120  0 - 150 mg/dL Final   • HDL Cholesterol 02/27/2023 49  40 - 60 mg/dL Final   • LDL Cholesterol  02/27/2023 83  0 - 100 mg/dL Final   • VLDL Cholesterol 02/27/2023 21  5 - 40 mg/dL Final   • LDL/HDL Ratio 02/27/2023 1.63   Final   • TSH 02/27/2023 2.740  0.270 - 4.200 uIU/mL Final   • Free T4 02/27/2023 1.27  0.93 - 1.70 ng/dL Final   • 25 Hydroxy, Vitamin D 02/27/2023 23.7 (L)  30.0 - 100.0 ng/ml Final      XR Chest PA & Lateral  Narrative: TWO VIEW CHEST    HISTORY: Dyspnea. COPD. Pneumonia. Acute exacerbation of COPD.    Frontal and lateral films of the chest were obtained.    COMPARISON: May 26, 2022    FINDINGS:    Partial clearing of the right lower lobe infiltrate and  atelectasis.  The heart is not enlarged.  The pulmonary vasculature is not increased.  No pleural effusion.  No pneumothorax.  Osteoporosis.  Old moderate compression deformity lower thoracic spine.  Impression: CONCLUSION:  Partial clearing of the right lower lobe infiltrate and  atelectasis.    [id number]    Electronically signed by:  Luke Elliott MD  6/9/2022 8:52 PM CDT  Workstation:  [id number]    [email or web address]  Immunization History   Administered Date(s) Administered   • Fluad Quad 65+ 10/14/2020   • Fluzone High-Dose 65+yrs 11/23/2021   • Pneumococcal Polysaccharide (PPSV23) 08/31/2021       The following portions of the patient's history were reviewed and updated as appropriate: allergies, current medications, past family history, past medical history, past social history, past surgical history and problem list.        Physical Exam  /58 (BP Location: Right arm, Patient Position: Sitting, Cuff Size: Adult)   Pulse 68   Temp 98.3 °F (36.8 °C)   Ht 157.5 cm (62\")   Wt 41.8 kg (92 lb 3.2 oz)   SpO2 96%   BMI 16.86 kg/m² .  EKG: on 5/17/23 showed NSR    /58 (BP Location: Right arm, Patient Position: Sitting, Cuff Size: Adult)   Pulse 68   Temp 98.3 °F (36.8 °C)   Ht 157.5 cm (62\")   Wt 41.8 kg (92 lb 3.2 oz)   SpO2 96%   BMI 16.86 kg/m²     Physical Exam  Vitals and nursing note reviewed.   Constitutional:       Appearance: She is well-developed. She is not diaphoretic.   HENT:      Head: Normocephalic and atraumatic.      Right Ear: External ear normal.   Eyes:      Conjunctiva/sclera: Conjunctivae normal.      Pupils: Pupils are equal, round, and reactive to light.   Cardiovascular:      Rate and Rhythm: Normal rate and regular rhythm.      Heart sounds: Normal heart sounds. No murmur heard.  Pulmonary:      Effort: Respiratory distress present.      Comments: Decreased breath sounds   Abdominal:      General: Bowel sounds are normal. There is no distension.      Palpations: Abdomen is soft.      Tenderness: There is no abdominal tenderness.   Musculoskeletal:         General: Tenderness present. No deformity. Normal range of motion.      Cervical back: Normal range of motion and neck supple.   Skin:     General: Skin is warm.      Coloration: Skin is not pale.      Findings: No erythema or rash.   Neurological:      Mental Status: She is alert and oriented to person, " place, and time.      Cranial Nerves: No cranial nerve deficit.   Psychiatric:         Behavior: Behavior normal.         [unfilled]   Diagnosis Plan   1. Chronic obstructive pulmonary disease, unspecified COPD type  Ambulatory Referral to Pulmonology      2. Essential hypertension        3. Mixed hyperlipidemia        4. Underweight        5. Stage 2 chronic kidney disease        6. Age related osteoporosis, unspecified pathological fracture presence        7. Other migraine without status migrainosus, not intractable        8. Tobacco user        9. Lung nodule  Ambulatory Referral to Hematology    Ambulatory Referral to Pulmonology      10. Lymphocytosis  Ambulatory Referral to Hematology      11. Monocytosis  Ambulatory Referral to Hematology      12. Neutrophilia  Ambulatory Referral to Hematology      13. Coronary artery disease involving native coronary artery of native heart, unspecified whether angina present  ECG 12 Lead    CBC Auto Differential    Comprehensive Metabolic Panel    Lipid Panel    Vitamin D,25-Hydroxy    High Sensitivity Troponin T    Ambulatory Referral to Cardiology      14. Pulmonary hypertension        15. Lymphadenopathy of head and neck region  Ambulatory Referral to Hematology      16. Vitamin D deficiency, unspecified  Vitamin D,25-Hydroxy      17. Chest pain, unspecified type  High Sensitivity Troponin T    Ambulatory Referral to Cardiology      18. Family history of heart disease  Ambulatory Referral to Cardiology      19. Dyspnea on exertion  BNP               -recommend labwork   -recommend colonoscopy  pt declined.   -recommend COVID-19 vaccination  -recommend mammogram  screening-schedule at imaging center   -recommend DEXA scan  - schedule at imaging center   -coronary atherosclerosis  - EKG today showed NSR no ST changes no atrial fibrillation  Recommend aspirin 81 mg daily. Recommend pt see Cardiology  -COPD/COPD exacerbation/ dyspnea/lung nodule- recomemnd Pulmonology  referral was  On  Anoro 1 puff daily.  On albuterol PRN and nebulizers.  Will try breztri. 2 puffs BID. Gave drug information sample provided   -lymphocytosis, neutrophilia, monocytosis/pretracheal lymph node  -recommend Hematology referral.    -pulmonary hypertension/CAD -recommend Cardiology referral. EKG today  -GERD with hiatal hernia - recommend Gastroenterology referral   -migraine headaches - on elavil 50 mg at bedtime on propranolol 40 mg gdaily.   -underweight/moderate protein malnutrition - recommend high calorie diet. BMI at 16.79. recommend high calorie diet, recommend smoking cesation   -CKD stage 2 - gave information. Continue to monitor   -allergic rhinitis - on flonase nasal spray   -HTN -  controlled on norvasc 10 mg daily.   on  propranolol 50 mg PO q daily. will switch to toprol XL 25 mg daily. Drug information provided. Stop propranolol   -HLP on simvastatin 20 mg Po qhs   -old compression fracture of thoracic spine - referred to Spine Surgeon but surgery not indicated   on elavil 50 mg PO qhs  Recommend  Pain management but pt declined. Try tylenol 3 with codeine. Gave 30 pills   - osteoporosis - pt already on citracal with Vitamin D.r ecommend prolia but pt declined.  Recommend repeat DEXA scan  Was on fosamax but has been on this for years. prolia injection declined by insurance. Recommend bone clinic but pt declined   - tobacco user advised pt to quit smoking >5 minutes.  Pt declines nicotine patches or chantix. Recommend 1 800 QUIT NOW  -advised pt to be safe and call with questions and concerns  -advised pt to go to ER or call 911 if symptoms worrisome or severe  -advised pt to followup with specialist and referrals   -advised pt to be safe during COVID-19 pandemic   I spent  36 minutes caring for Jenn on this date of service. This time includes time spent by me in the following activities: preparing for the visit, reviewing tests, obtaining and/or reviewing a separately obtained history,  performing a medically appropriate examination and/or evaluation, counseling and educating the patient/family/caregiver, ordering medications, tests, or procedures, referring and communicating with other health care professionals, documenting information in the medical record and independently interpreting results and communicating that information with the patient/family/caregiver.         This document has been electronically signed by Diego Gusman MD on May 17, 2023 08:35 CDT

## 2023-05-14 PROBLEM — I27.20 PULMONARY HYPERTENSION: Status: ACTIVE | Noted: 2023-05-14

## 2023-05-14 PROBLEM — I25.10 CORONARY ARTERY DISEASE INVOLVING NATIVE CORONARY ARTERY OF NATIVE HEART: Status: ACTIVE | Noted: 2023-05-14

## 2023-05-14 PROBLEM — D72.9 NEUTROPHILIA: Status: ACTIVE | Noted: 2023-05-14

## 2023-05-17 ENCOUNTER — LAB (OUTPATIENT)
Dept: LAB | Facility: HOSPITAL | Age: 68
End: 2023-05-17
Payer: MEDICARE

## 2023-05-17 ENCOUNTER — OFFICE VISIT (OUTPATIENT)
Dept: FAMILY MEDICINE CLINIC | Facility: CLINIC | Age: 68
End: 2023-05-17
Payer: MEDICARE

## 2023-05-17 VITALS
TEMPERATURE: 98.3 F | BODY MASS INDEX: 16.97 KG/M2 | OXYGEN SATURATION: 96 % | WEIGHT: 92.2 LBS | SYSTOLIC BLOOD PRESSURE: 108 MMHG | HEIGHT: 62 IN | HEART RATE: 68 BPM | DIASTOLIC BLOOD PRESSURE: 58 MMHG

## 2023-05-17 DIAGNOSIS — I25.10 CORONARY ARTERY DISEASE INVOLVING NATIVE CORONARY ARTERY OF NATIVE HEART, UNSPECIFIED WHETHER ANGINA PRESENT: ICD-10-CM

## 2023-05-17 DIAGNOSIS — N18.2 STAGE 2 CHRONIC KIDNEY DISEASE: ICD-10-CM

## 2023-05-17 DIAGNOSIS — D72.820 LYMPHOCYTOSIS: ICD-10-CM

## 2023-05-17 DIAGNOSIS — R06.09 DYSPNEA ON EXERTION: ICD-10-CM

## 2023-05-17 DIAGNOSIS — I10 ESSENTIAL HYPERTENSION: ICD-10-CM

## 2023-05-17 DIAGNOSIS — M81.0 AGE RELATED OSTEOPOROSIS, UNSPECIFIED PATHOLOGICAL FRACTURE PRESENCE: ICD-10-CM

## 2023-05-17 DIAGNOSIS — Z72.0 TOBACCO USER: ICD-10-CM

## 2023-05-17 DIAGNOSIS — E55.9 VITAMIN D DEFICIENCY, UNSPECIFIED: ICD-10-CM

## 2023-05-17 DIAGNOSIS — G43.809 OTHER MIGRAINE WITHOUT STATUS MIGRAINOSUS, NOT INTRACTABLE: ICD-10-CM

## 2023-05-17 DIAGNOSIS — R63.6 UNDERWEIGHT: ICD-10-CM

## 2023-05-17 DIAGNOSIS — I27.20 PULMONARY HYPERTENSION: ICD-10-CM

## 2023-05-17 DIAGNOSIS — R91.1 LUNG NODULE: ICD-10-CM

## 2023-05-17 DIAGNOSIS — D72.821 MONOCYTOSIS: ICD-10-CM

## 2023-05-17 DIAGNOSIS — R07.9 CHEST PAIN, UNSPECIFIED TYPE: ICD-10-CM

## 2023-05-17 DIAGNOSIS — Z82.49 FAMILY HISTORY OF HEART DISEASE: ICD-10-CM

## 2023-05-17 DIAGNOSIS — D72.9 NEUTROPHILIA: ICD-10-CM

## 2023-05-17 DIAGNOSIS — E78.2 MIXED HYPERLIPIDEMIA: ICD-10-CM

## 2023-05-17 DIAGNOSIS — R59.0 LYMPHADENOPATHY OF HEAD AND NECK REGION: ICD-10-CM

## 2023-05-17 DIAGNOSIS — J44.9 CHRONIC OBSTRUCTIVE PULMONARY DISEASE, UNSPECIFIED COPD TYPE: Primary | ICD-10-CM

## 2023-05-17 LAB
25(OH)D3 SERPL-MCNC: 20.1 NG/ML (ref 30–100)
ALBUMIN SERPL-MCNC: 4.1 G/DL (ref 3.5–5.2)
ALBUMIN/GLOB SERPL: 1.4 G/DL
ALP SERPL-CCNC: 185 U/L (ref 39–117)
ALT SERPL W P-5'-P-CCNC: 10 U/L (ref 1–33)
ANION GAP SERPL CALCULATED.3IONS-SCNC: 10 MMOL/L (ref 5–15)
AST SERPL-CCNC: 15 U/L (ref 1–32)
BASOPHILS # BLD AUTO: 0.06 10*3/MM3 (ref 0–0.2)
BASOPHILS NFR BLD AUTO: 0.5 % (ref 0–1.5)
BILIRUB SERPL-MCNC: 0.2 MG/DL (ref 0–1.2)
BUN SERPL-MCNC: 9 MG/DL (ref 8–23)
BUN/CREAT SERPL: 12.9 (ref 7–25)
CALCIUM SPEC-SCNC: 9.6 MG/DL (ref 8.6–10.5)
CHLORIDE SERPL-SCNC: 100 MMOL/L (ref 98–107)
CHOLEST SERPL-MCNC: 153 MG/DL (ref 0–200)
CO2 SERPL-SCNC: 28 MMOL/L (ref 22–29)
CREAT SERPL-MCNC: 0.7 MG/DL (ref 0.57–1)
DEPRECATED RDW RBC AUTO: 41.4 FL (ref 37–54)
EGFRCR SERPLBLD CKD-EPI 2021: 94.3 ML/MIN/1.73
EOSINOPHIL # BLD AUTO: 0.18 10*3/MM3 (ref 0–0.4)
EOSINOPHIL NFR BLD AUTO: 1.5 % (ref 0.3–6.2)
ERYTHROCYTE [DISTWIDTH] IN BLOOD BY AUTOMATED COUNT: 12.1 % (ref 12.3–15.4)
GLOBULIN UR ELPH-MCNC: 2.9 GM/DL
GLUCOSE SERPL-MCNC: 97 MG/DL (ref 65–99)
HCT VFR BLD AUTO: 42.1 % (ref 34–46.6)
HDLC SERPL-MCNC: 46 MG/DL (ref 40–60)
HGB BLD-MCNC: 14.6 G/DL (ref 12–15.9)
IMM GRANULOCYTES # BLD AUTO: 0.04 10*3/MM3 (ref 0–0.05)
IMM GRANULOCYTES NFR BLD AUTO: 0.3 % (ref 0–0.5)
LDLC SERPL CALC-MCNC: 84 MG/DL (ref 0–100)
LDLC/HDLC SERPL: 1.77 {RATIO}
LYMPHOCYTES # BLD AUTO: 2.74 10*3/MM3 (ref 0.7–3.1)
LYMPHOCYTES NFR BLD AUTO: 22.4 % (ref 19.6–45.3)
MCH RBC QN AUTO: 32.4 PG (ref 26.6–33)
MCHC RBC AUTO-ENTMCNC: 34.7 G/DL (ref 31.5–35.7)
MCV RBC AUTO: 93.3 FL (ref 79–97)
MONOCYTES # BLD AUTO: 0.79 10*3/MM3 (ref 0.1–0.9)
MONOCYTES NFR BLD AUTO: 6.5 % (ref 5–12)
NEUTROPHILS NFR BLD AUTO: 68.8 % (ref 42.7–76)
NEUTROPHILS NFR BLD AUTO: 8.4 10*3/MM3 (ref 1.7–7)
NRBC BLD AUTO-RTO: 0 /100 WBC (ref 0–0.2)
NT-PROBNP SERPL-MCNC: 92.8 PG/ML (ref 0–900)
PLATELET # BLD AUTO: 346 10*3/MM3 (ref 140–450)
PMV BLD AUTO: 11 FL (ref 6–12)
POTASSIUM SERPL-SCNC: 3.8 MMOL/L (ref 3.5–5.2)
PROT SERPL-MCNC: 7 G/DL (ref 6–8.5)
RBC # BLD AUTO: 4.51 10*6/MM3 (ref 3.77–5.28)
SODIUM SERPL-SCNC: 138 MMOL/L (ref 136–145)
TRIGL SERPL-MCNC: 129 MG/DL (ref 0–150)
TROPONIN T SERPL HS-MCNC: 7 NG/L
VLDLC SERPL-MCNC: 23 MG/DL (ref 5–40)
WBC NRBC COR # BLD: 12.21 10*3/MM3 (ref 3.4–10.8)

## 2023-05-17 PROCEDURE — 80053 COMPREHEN METABOLIC PANEL: CPT

## 2023-05-17 PROCEDURE — 85025 COMPLETE CBC W/AUTO DIFF WBC: CPT

## 2023-05-17 PROCEDURE — 82306 VITAMIN D 25 HYDROXY: CPT

## 2023-05-17 PROCEDURE — 83880 ASSAY OF NATRIURETIC PEPTIDE: CPT

## 2023-05-17 PROCEDURE — 84484 ASSAY OF TROPONIN QUANT: CPT

## 2023-05-17 PROCEDURE — 80061 LIPID PANEL: CPT

## 2023-05-17 RX ORDER — ASPIRIN 81 MG/1
81 TABLET ORAL DAILY
Qty: 30 TABLET | Refills: 3
Start: 2023-05-17

## 2023-05-17 NOTE — PATIENT INSTRUCTIONS
Cardiology - Dr. Burger for CAD, chest pain dyspnea, pulmonary hypertension    Pulmonology - Dr. Jaffe for lung nodules COPD, tobacco use    Hematology/Oncology - Dr. Esquivel for lymph node, monocytosis, lymphocytosis neutrophil    Continue aspirin    Continue propranolol    Recheck in 2 months    Labwork today  fasting.

## 2023-05-18 LAB
QT INTERVAL: 422 MS
QTC INTERVAL: 445 MS

## 2023-05-24 ENCOUNTER — DOCUMENTATION (OUTPATIENT)
Dept: ONCOLOGY | Facility: HOSPITAL | Age: 68
End: 2023-05-24
Payer: MEDICARE

## 2023-05-24 ENCOUNTER — CONSULT (OUTPATIENT)
Dept: ONCOLOGY | Facility: CLINIC | Age: 68
End: 2023-05-24
Payer: MEDICARE

## 2023-05-24 VITALS
WEIGHT: 93.8 LBS | HEART RATE: 84 BPM | SYSTOLIC BLOOD PRESSURE: 99 MMHG | OXYGEN SATURATION: 94 % | BODY MASS INDEX: 17.16 KG/M2 | DIASTOLIC BLOOD PRESSURE: 52 MMHG | TEMPERATURE: 98.6 F | RESPIRATION RATE: 18 BRPM

## 2023-05-24 DIAGNOSIS — R91.1 LUNG NODULE: ICD-10-CM

## 2023-05-24 DIAGNOSIS — Z86.718 HISTORY OF DVT (DEEP VEIN THROMBOSIS): ICD-10-CM

## 2023-05-24 DIAGNOSIS — Z85.820 HISTORY OF MELANOMA: ICD-10-CM

## 2023-05-24 DIAGNOSIS — R19.5 POSITIVE COLORECTAL CANCER SCREENING USING COLOGUARD TEST: Primary | ICD-10-CM

## 2023-05-24 DIAGNOSIS — D72.828 OTHER ELEVATED WHITE BLOOD CELL (WBC) COUNT: ICD-10-CM

## 2023-05-24 PROBLEM — D72.829 LEUKOCYTOSIS: Status: ACTIVE | Noted: 2023-05-14

## 2023-05-24 NOTE — LETTER
May 24, 2023       No Recipients    Patient: Jenn eLe   YOB: 1955   Date of Visit: 5/24/2023       Dear Diego Gusman MD:    Thank you for referring Jenn Lee to me for evaluation. Below are the relevant portions of my assessment and plan of care.    Encounter Diagnosis and Orders:  Diagnoses and all orders for this visit:    1. Positive colorectal cancer screening using Cologuard test (Primary)  -     Ambulatory Referral to Gastroenterology    2. Other elevated white blood cell (WBC) count  -     CBC & Differential; Future  -     Comprehensive Metabolic Panel; Future  -     CT Chest With Contrast Diagnostic; Future    3. Lung nodule  -     CBC & Differential; Future  -     Comprehensive Metabolic Panel; Future  -     CT Chest With Contrast Diagnostic; Future    4. History of DVT (deep vein thrombosis)    5. History of melanoma        If you have questions, please do not hesitate to call me. I look forward to following Jenn along with you.         Sincerely,        Herb Esquivel MD        CC:   No Recipients

## 2023-05-24 NOTE — PROGRESS NOTES
DATE OF CONSULT: 5/24/2023    REQUESTING SOURCE:  Diego Gusman MD  [facility] [address]  [place] [address]      REASON FOR CONSULTATION: Abnormal Cologuard test, leukocytosis, lung nodule, weight loss      HISTORY OF PRESENT ILLNESS:   68-year-old female with medical problem consisting of 80-pack-year smoking history, hypertension, dyslipidemia, history of hysterectomy at age 21 who has been referred to [facility] for further evaluation and recommendation regarding leukocytosis, abnormal low-dose CT of chest showing suspicious lung nodule as well as lymph node enlargement.  Patient complains of chronic cough productive of yellowish sputum.  Denies any hemoptysis.  Admits to weight loss over last 3 to 5 years but denies any recent weight loss in last 6 months.  States she had abnormal Cologuard test done in November 2020 but she has not had any follow-up colonoscopies done since then.  Complains of chronic constipation.  Denies any blood in stool.  Denies any new abdominal pain.  Denies any new headache or dizziness.  Admits to history of melanoma on left side of face for which she had a surgery done more than 30 years ago without any adjuvant treatment.  Also admits to history of right lower extremity DVT x2 with last occurrence more than 40 years ago for which she received heparin at that point.  Family history significant for mother having lung cancer, there is also history of skin cancer and breast cancer in family.  Complains of chronic arthralgia and back pain.  Complains of intermittent left chest pain for which she is scheduled to be seen by cardiologist.  Denies any fevers.                PAST MEDICAL HISTORY:    Past Medical History:   Diagnosis Date   • Ankle edema    • Chronic bronchitis    • Migraine    • Pain in right knee     nondisplaced facture of the patella     • Peroneal tendinitis    • Sprain of ankle        PAST SURGICAL HISTORY:  Past Surgical History:   Procedure Laterality  Date   • HYSTERECTOMY     • TONSILLECTOMY         ALLERGIES:    Allergies   Allergen Reactions   • Lisinopril Cough       SOCIAL HISTORY:   Social History     Tobacco Use   • Smoking status: Every Day   • Smokeless tobacco: Never   Vaping Use   • Vaping Use: Never used   Substance Use Topics   • Alcohol use: Yes     Comment: social   • Drug use: No       CURRENT MEDICATIONS:    Current Outpatient Medications   Medication Sig Dispense Refill   • albuterol (ACCUNEB) 1.25 MG/3ML nebulizer solution Take 3 mL by nebulization Every 6 (Six) Hours As Needed for Wheezing or Shortness of Air. 360 mL 3   • albuterol sulfate  (90 Base) MCG/ACT inhaler Inhale 2 puffs Every 4 (Four) Hours As Needed for Wheezing or Shortness of Air. 18 g 1   • amitriptyline (ELAVIL) 25 MG tablet Take 2 tablets by mouth Every Night. 180 tablet 1   • amLODIPine (NORVASC) 10 MG tablet Take 1 tablet by mouth Daily. 90 tablet 1   • aspirin 81 MG EC tablet Take 1 tablet by mouth Daily. 30 tablet 3   • fluticasone (FLONASE) 50 MCG/ACT nasal spray 1 spray into the nostril(s) as directed by provider Daily. 16 g 3   • montelukast (SINGULAIR) 10 MG tablet Take 1 tablet by mouth every night at bedtime. 90 tablet 1   • Nebulizer device 1 application 4 (Four) Times a Day. 1 each 1   • propranolol (INDERAL) 40 MG tablet Take 1 tablet by mouth Daily. 90 tablet 1   • simvastatin (Zocor) 20 MG tablet Take 1 tablet by mouth Every Night. 90 tablet 3   • Budeson-Glycopyrrol-Formoterol (BREZTRI) 160-9-4.8 MCG/ACT aerosol inhaler Inhale 2 puffs 2 (Two) Times a Day. (Patient not taking: Reported on 5/24/2023) 10.7 g 3     No current facility-administered medications for this visit.        HOME MEDICATIONS:   Current Outpatient Medications on File Prior to Visit   Medication Sig Dispense Refill   • albuterol (ACCUNEB) 1.25 MG/3ML nebulizer solution Take 3 mL by nebulization Every 6 (Six) Hours As Needed for Wheezing or Shortness of Air. 360 mL 3   • albuterol  sulfate  (90 Base) MCG/ACT inhaler Inhale 2 puffs Every 4 (Four) Hours As Needed for Wheezing or Shortness of Air. 18 g 1   • amitriptyline (ELAVIL) 25 MG tablet Take 2 tablets by mouth Every Night. 180 tablet 1   • amLODIPine (NORVASC) 10 MG tablet Take 1 tablet by mouth Daily. 90 tablet 1   • aspirin 81 MG EC tablet Take 1 tablet by mouth Daily. 30 tablet 3   • fluticasone (FLONASE) 50 MCG/ACT nasal spray 1 spray into the nostril(s) as directed by provider Daily. 16 g 3   • montelukast (SINGULAIR) 10 MG tablet Take 1 tablet by mouth every night at bedtime. 90 tablet 1   • Nebulizer device 1 application 4 (Four) Times a Day. 1 each 1   • propranolol (INDERAL) 40 MG tablet Take 1 tablet by mouth Daily. 90 tablet 1   • simvastatin (Zocor) 20 MG tablet Take 1 tablet by mouth Every Night. 90 tablet 3   • Budeson-Glycopyrrol-Formoterol (BREZTRI) 160-9-4.8 MCG/ACT aerosol inhaler Inhale 2 puffs 2 (Two) Times a Day. (Patient not taking: Reported on 5/24/2023) 10.7 g 3     No current facility-administered medications on file prior to visit.       FAMILY HISTORY:    Family History   Problem Relation Age of Onset   • Alcohol abuse Other    • Asthma Other    • Breast cancer Other    • Diabetes Other    • Glaucoma Other    • Heart disease Other    • Hypertension Other    • Stroke Other          Review of Systems   Constitutional: Positive for fatigue and unexpected weight change.   Respiratory: Positive for cough and shortness of breath.    Cardiovascular: Positive for chest pain and palpitations.   Gastrointestinal: Positive for constipation.   Musculoskeletal: Positive for arthralgias.   Hematological: Negative for adenopathy.      A comprehensive 14 point review of systems was performed and was negative except as mentioned.      OBJECTIVEBEGIN@     Vitals:    05/24/23 1436   BP: 99/52   Pulse: 84   Resp: 18   Temp: 98.6 °F (37 °C)   SpO2: 94%   Weight: 42.5 kg (93 lb 12.8 oz)   PainSc: 4  Comment: back          View  : No data to display.                Physical Exam  Cardiovascular:      Rate and Rhythm: Normal rate and regular rhythm.   Pulmonary:      Breath sounds: Normal breath sounds.   Musculoskeletal:      Right lower leg: No edema.      Left lower leg: No edema.   Lymphadenopathy:      Cervical: No cervical adenopathy.   Neurological:      Mental Status: She is alert and oriented to person, place, and time.           DIAGNOSTIC DATA:    WBC   Date Value Ref Range Status   05/17/2023 12.21 (H) 3.40 - 10.80 10*3/mm3 Final     RBC   Date Value Ref Range Status   05/17/2023 4.51 3.77 - 5.28 10*6/mm3 Final     Hemoglobin   Date Value Ref Range Status   05/17/2023 14.6 12.0 - 15.9 g/dL Final     Hematocrit   Date Value Ref Range Status   05/17/2023 42.1 34.0 - 46.6 % Final     MCV   Date Value Ref Range Status   05/17/2023 93.3 79.0 - 97.0 fL Final     MCH   Date Value Ref Range Status   05/17/2023 32.4 26.6 - 33.0 pg Final     MCHC   Date Value Ref Range Status   05/17/2023 34.7 31.5 - 35.7 g/dL Final     RDW   Date Value Ref Range Status   05/17/2023 12.1 (L) 12.3 - 15.4 % Final     RDW-SD   Date Value Ref Range Status   05/17/2023 41.4 37.0 - 54.0 fl Final     MPV   Date Value Ref Range Status   05/17/2023 11.0 6.0 - 12.0 fL Final     Platelets   Date Value Ref Range Status   05/17/2023 346 140 - 450 10*3/mm3 Final     Neutrophil %   Date Value Ref Range Status   05/17/2023 68.8 42.7 - 76.0 % Final     Lymphocyte %   Date Value Ref Range Status   05/17/2023 22.4 19.6 - 45.3 % Final     Monocyte %   Date Value Ref Range Status   05/17/2023 6.5 5.0 - 12.0 % Final     Eosinophil %   Date Value Ref Range Status   05/17/2023 1.5 0.3 - 6.2 % Final     Basophil %   Date Value Ref Range Status   05/17/2023 0.5 0.0 - 1.5 % Final     Immature Grans %   Date Value Ref Range Status   05/17/2023 0.3 0.0 - 0.5 % Final     Neutrophils, Absolute   Date Value Ref Range Status   05/17/2023 8.40 (H) 1.70 - 7.00 10*3/mm3 Final      Lymphocytes, Absolute   Date Value Ref Range Status   05/17/2023 2.74 0.70 - 3.10 10*3/mm3 Final     Monocytes, Absolute   Date Value Ref Range Status   05/17/2023 0.79 0.10 - 0.90 10*3/mm3 Final     Eosinophils, Absolute   Date Value Ref Range Status   05/17/2023 0.18 0.00 - 0.40 10*3/mm3 Final     Basophils, Absolute   Date Value Ref Range Status   05/17/2023 0.06 0.00 - 0.20 10*3/mm3 Final     Immature Grans, Absolute   Date Value Ref Range Status   05/17/2023 0.04 0.00 - 0.05 10*3/mm3 Final     nRBC   Date Value Ref Range Status   05/17/2023 0.0 0.0 - 0.2 /100 WBC Final     Glucose   Date Value Ref Range Status   05/17/2023 97 65 - 99 mg/dL Final     Sodium   Date Value Ref Range Status   05/17/2023 138 136 - 145 mmol/L Final     Potassium   Date Value Ref Range Status   05/17/2023 3.8 3.5 - 5.2 mmol/L Final     CO2   Date Value Ref Range Status   05/17/2023 28.0 22.0 - 29.0 mmol/L Final     Chloride   Date Value Ref Range Status   05/17/2023 100 98 - 107 mmol/L Final     Anion Gap   Date Value Ref Range Status   05/17/2023 10.0 5.0 - 15.0 mmol/L Final     Creatinine   Date Value Ref Range Status   05/17/2023 0.70 0.57 - 1.00 mg/dL Final     BUN   Date Value Ref Range Status   05/17/2023 9 8 - 23 mg/dL Final     BUN/Creatinine Ratio   Date Value Ref Range Status   05/17/2023 12.9 7.0 - 25.0 Final     Calcium   Date Value Ref Range Status   05/17/2023 9.6 8.6 - 10.5 mg/dL Final     eGFR Non  Amer   Date Value Ref Range Status   09/28/2021 77 >60 mL/min/1.73 Final     Alkaline Phosphatase   Date Value Ref Range Status   05/17/2023 185 (H) 39 - 117 U/L Final     Total Protein   Date Value Ref Range Status   05/17/2023 7.0 6.0 - 8.5 g/dL Final     ALT (SGPT)   Date Value Ref Range Status   05/17/2023 10 1 - 33 U/L Final     AST (SGOT)   Date Value Ref Range Status   05/17/2023 15 1 - 32 U/L Final     Total Bilirubin   Date Value Ref Range Status   05/17/2023 0.2 0.0 - 1.2 mg/dL Final     Albumin   Date  Value Ref Range Status   05/17/2023 4.1 3.5 - 5.2 g/dL Final     Globulin   Date Value Ref Range Status   05/17/2023 2.9 gm/dL Final     Lab Results   Component Value Date    [id number] 397 05/26/2022     No results found for: , LABCA2, AFPTM, HCGQUANT, , CHROMGRNA, [id number], CEA, REFLABREPO]    Radiology Data :  Low-dose CT of chest without contrast done on 13 March 2023 at [facility] showed:                  No radiology results for the last 7 days    Pathology :  * Cannot find OR log *    Assessment and plan:    1.  Leukocytosis:  - Patient's white blood cell count is elevated at 12.5 which is again predominantly increase in neutrophil.  - Due to her smoking status I believe leukocytosis is reactive to her smoking.  - Patient does have a history of intermittent elevation in lymphocyte and monocyte in the past.  Recent absolute lymphocyte count and monocyte count is within normal limit.  - We will recheck CBC CMP prior to next clinic visit in 3 weeks.    2.  Lung nodules:  - Recent low-dose CT of chest done at [facility] shows new onset of lung nodules affecting right lung as well as spiculated 8 mm lung nodule affecting left lower lobe.  Patient also has minimally enlarged paratracheal lymph node measuring at 11 mm.  - Due to her 80-pack-year smoking history lung nodule is worrisome for developing malignancy.  - Recommend repeating CT of chest with contrast prior to next clinic visit in 3 weeks to follow-up on lung nodule.  Discussed with patient if CT scan shows any worsening lung nodule or enlargement she will need work-up including biopsy for lung nodules.    3.  Abnormal Cologuard test  - Cologuard test done in November 2020 was positive.  - Patient did not have any follow-up colonoscopy done for positive Cologuard test.  Recommend gastroenterology evaluation with possible colonoscopy for abnormal Cologuard test  - Gastroenterology referral has been placed  today.    4.  History of DVT:  - Patient has a history of right lower extremity DVT x2.  Last episode was more than 30 to 40 years ago  - We will continue with clinical monitoring for now    5.  History of melanoma on left side of face  - Patient had a surgery done for melanoma more than 30 years ago without any adjuvant radiation or chemotherapy  - We will continue with clinical monitoring    6.  Hypertension    7.  Dyslipidemia    8.  Health maintenance: Patient continues to smoke, has been counseled about smoking cessation.          PHQ-9 Total Score: 0   -Patient is not homicidal or suicidal.  No acute intervention required.      Jenn Lee reports a pain score of 4.  Given her pain assessment as noted, treatment options were discussed and the following options were decided upon as a follow-up plan to address the patient's pain: continuation of current treatment plan for pain.             Thank you for this consultation.    Herb Esquivel MD  5/24/2023  15:33 CDT        Part of this note may be an electronic transcription/translation of spoken language to printed text using the Dragon Dictation System.

## 2023-05-26 LAB
QT INTERVAL: 422 MS
QTC INTERVAL: 445 MS

## 2023-06-02 RX ORDER — SIMVASTATIN 20 MG
TABLET ORAL
Qty: 90 TABLET | Refills: 3 | Status: SHIPPED | OUTPATIENT
Start: 2023-06-02

## 2023-06-05 DIAGNOSIS — F17.200 SMOKER: ICD-10-CM

## 2023-06-05 DIAGNOSIS — Z76.89 ENCOUNTER FOR EVALUATION OF COPD: Primary | ICD-10-CM

## 2023-06-05 DIAGNOSIS — R91.8 LUNG NODULES: ICD-10-CM

## 2023-06-14 ENCOUNTER — OFFICE VISIT (OUTPATIENT)
Dept: PULMONOLOGY | Facility: CLINIC | Age: 68
End: 2023-06-14
Payer: MEDICARE

## 2023-06-14 ENCOUNTER — PROCEDURE VISIT (OUTPATIENT)
Dept: PULMONOLOGY | Facility: CLINIC | Age: 68
End: 2023-06-14
Payer: MEDICARE

## 2023-06-14 VITALS
HEART RATE: 75 BPM | SYSTOLIC BLOOD PRESSURE: 106 MMHG | HEIGHT: 62 IN | OXYGEN SATURATION: 97 % | WEIGHT: 92 LBS | BODY MASS INDEX: 16.93 KG/M2 | DIASTOLIC BLOOD PRESSURE: 80 MMHG

## 2023-06-14 DIAGNOSIS — J44.9 STAGE 1 MILD COPD BY GOLD CLASSIFICATION: Primary | ICD-10-CM

## 2023-06-14 DIAGNOSIS — I27.20 PULMONARY HYPERTENSION: ICD-10-CM

## 2023-06-14 DIAGNOSIS — R05.3 CHRONIC COUGH: ICD-10-CM

## 2023-06-14 DIAGNOSIS — F17.210 CIGARETTE SMOKER: ICD-10-CM

## 2023-06-14 DIAGNOSIS — Z76.89 ENCOUNTER FOR EVALUATION OF COPD: Primary | ICD-10-CM

## 2023-06-14 DIAGNOSIS — R91.8 LUNG NODULES: ICD-10-CM

## 2023-06-14 DIAGNOSIS — F17.200 SMOKER: ICD-10-CM

## 2023-06-14 RX ORDER — FLUTICASONE FUROATE, UMECLIDINIUM BROMIDE AND VILANTEROL TRIFENATATE 100; 62.5; 25 UG/1; UG/1; UG/1
1 POWDER RESPIRATORY (INHALATION)
Qty: 28 EACH | Refills: 11 | Status: SHIPPED | OUTPATIENT
Start: 2023-06-14

## 2023-06-14 NOTE — PROGRESS NOTES
FULL PFT WITH BRONCHODILATOR PERFORMED.     GOOD PATIENT EFFORT AND COOPERATION.     3-5 SECOND EXHALATION ON PRE-SPIROMETRY.    3-6 SECOND EXHALATION ON POST SPIROMETRY, NO PLATEAU ACHIEVED, END OF TEST CRITERIA NOT MET.     ORDERED BY RAJESH MAYA; READ BY DR. ZHOU

## 2023-06-14 NOTE — PROGRESS NOTES
Pulmonary Office Visit    Diego Gusman MD    Thank you for asking me to see Jenn Lee for   Chief Complaint   Patient presents with    COPD    Lung Nodule     Followed by Dr. Esquivel       History of Present Illness  Ms. Lee is a 68 year old patient referred from PCP for COPD. She has not PFTs on file, she is a lifelong smoker. She was referred to Oncology for leukocytosis and they planned a CT for the lung nodules also next week. Will let them follow that since work up was started by Dr. Esquivel.     She has a 24 pack year smoking history. She still enjoys smoking. She has tried to quit several times.     She has mild COPD on PFTs. She has a low DLCO from smoking history. I am unable to view images, but report mentions moderate emphysema and scarring and enlarged pulmonary arteries. No echo on file.     Strong family history of cancer including lung and skin cancer.     She has history of asthma as a child.     She has a daily cough that is mostly productive all day long. Will check cultures, afb, and fungal as she has had frequent lung infections throughout the year with antibiotics. This is her largest complaint. She does not have much dyspnea.     Triggers/Pets/Occupation: retired medical assistant.   Prior medication use: anoro, breo, breztri.   Rescue inhaler use: infrequent.   ER visits/hospitalization/exacerbations at PCP/UC: none    Tobacco use history:  Social History     Socioeconomic History    Marital status:    Tobacco Use    Smoking status: Every Day     Packs/day: 0.50     Years: 48.00     Pack years: 24.00     Types: Cigarettes     Start date: 1975     Passive exposure: Current    Smokeless tobacco: Never   Vaping Use    Vaping Use: Never used   Substance and Sexual Activity    Alcohol use: Yes     Comment: social    Drug use: No    Sexual activity: Defer         Review of Systems: History obtained from chart review and the patient.  Review of Systems    As described in the HPI.  Otherwise, remainder of ROS (14 systems) were negative.    Patient Active Problem List   Diagnosis    Lower abdominal pain    Closed compression fracture of thoracic vertebra    Osteoporosis    Migraine    General medical examination    Chronic thoracic back pain    Tobacco user    Acute hip pain, left    Osteoarthritis of both hips    Lumbar spine pain    Trochanteric bursitis of left hip    Annual physical exam    Tobacco abuse counseling    Chronic low back pain    Essential hypertension    Hyperlipidemia    Chronic bilateral thoracic back pain    Severe protein-calorie malnutrition    Lymphocytosis    Stage 2 chronic kidney disease    Allergic rhinitis    Unspecified severe protein-calorie malnutrition    Underweight    Chronic bronchitis    COPD with exacerbation    Chronic midline thoracic back pain    Age related osteoporosis    Stage 1 mild COPD by GOLD classification    Community acquired pneumonia    Pulmonary hypertension    Coronary artery disease involving native coronary artery of native heart    Leukocytosis    Lymphadenopathy of head and neck region    Lung nodule    Cigarette smoker         Current Outpatient Medications:     albuterol (ACCUNEB) 1.25 MG/3ML nebulizer solution, Take 3 mL by nebulization Every 6 (Six) Hours As Needed for Wheezing or Shortness of Air., Disp: 360 mL, Rfl: 3    albuterol sulfate  (90 Base) MCG/ACT inhaler, Inhale 2 puffs Every 4 (Four) Hours As Needed for Wheezing or Shortness of Air., Disp: 18 g, Rfl: 1    amitriptyline (ELAVIL) 25 MG tablet, Take 2 tablets by mouth Every Night., Disp: 180 tablet, Rfl: 1    amLODIPine (NORVASC) 10 MG tablet, Take 1 tablet by mouth Daily., Disp: 90 tablet, Rfl: 1    aspirin 81 MG EC tablet, Take 1 tablet by mouth Daily., Disp: 30 tablet, Rfl: 3    fluticasone (FLONASE) 50 MCG/ACT nasal spray, 1 spray into the nostril(s) as directed by provider Daily., Disp: 16 g, Rfl: 3    montelukast (SINGULAIR) 10 MG tablet, Take 1 tablet by  "mouth every night at bedtime., Disp: 90 tablet, Rfl: 1    Nebulizer device, 1 application 4 (Four) Times a Day., Disp: 1 each, Rfl: 1    propranolol (INDERAL) 40 MG tablet, Take 1 tablet by mouth Daily., Disp: 90 tablet, Rfl: 1    simvastatin (ZOCOR) 20 MG tablet, TAKE ONE TABLET BY MOUTH EVERY NIGHT, Disp: 90 tablet, Rfl: 3    Fluticasone-Umeclidin-Vilant (Trelegy Ellipta) 100-62.5-25 MCG/ACT inhaler, Inhale 1 puff Daily., Disp: 28 each, Rfl: 11    Allergies   Allergen Reactions    Lisinopril Cough       Past Medical History:   Diagnosis Date    Ankle edema     Chronic bronchitis     Migraine     Pain in right knee     nondisplaced facture of the patella      Peroneal tendinitis     Sprain of ankle      Past Surgical History:   Procedure Laterality Date    HYSTERECTOMY      TONSILLECTOMY       Social History     Socioeconomic History    Marital status:    Tobacco Use    Smoking status: Every Day     Packs/day: 0.50     Years: 48.00     Pack years: 24.00     Types: Cigarettes     Start date: 1975     Passive exposure: Current    Smokeless tobacco: Never   Vaping Use    Vaping Use: Never used   Substance and Sexual Activity    Alcohol use: Yes     Comment: social    Drug use: No    Sexual activity: Defer     Family History   Problem Relation Age of Onset    Alcohol abuse Other     Asthma Other     Breast cancer Other     Diabetes Other     Glaucoma Other     Heart disease Other     Hypertension Other     Stroke Other        Advance Care Planning   ACP discussion was declined by the patient. Patient does not have an advance directive, declines further assistance.          Objective     /80   Pulse 75   Ht 157.5 cm (62\")   Wt 41.7 kg (92 lb)   SpO2 97%   BMI 16.83 kg/m²       BMI is below normal parameters (malnutrition). Recommendations: none (medical contraindication)      Physical Exam  Cardiovascular:      Rate and Rhythm: Normal rate and regular rhythm.      Heart sounds: Normal heart sounds. "   Pulmonary:      Effort: Pulmonary effort is normal.      Breath sounds: Normal breath sounds.   Neurological:      Mental Status: She is alert and oriented to person, place, and time.   Psychiatric:         Mood and Affect: Mood normal.         Behavior: Behavior normal.         Thought Content: Thought content normal.         Judgment: Judgment normal.       PFTs          PFTs: Done on: 6/14/23 (independently reviewed by myself, interpreted by physician)  Ratio 70  FVC 81  FEV1 72  TLC 82  DLCO 36    Post:  Ratio: 67  FVC: 87  FEV1: 75% (1.6L)      Radiology (independently reviewed by me, interpreted by physcian)    No radiology results for the last 30 days.         Assessment       Discussion/ Recommendations:      Diagnosis Plan   1. Stage 1 mild COPD by GOLD classification  Ratio: 67  FEV1: 72  Moderate emphysema type.   Emphysema type COPD is not usually very responsive to inhaler use, especially in the setting of mild COPD. She needs to quit smoking to improve her cough.     She was given Breztri and no longer taking due to formulary issues with [employer]. Was on Anoro prior. Currently using Breo 100 and likes this.      Will try for Trelegy and check cost. Needs LAMA.   May need breztri patient assistance as [employer] still requires $400 out of pocket.         2. Cigarette smoker    Smoking cessation counseling time spent was greater than 3 minutes but less than 10. Risks of continuing to use tobacco as well as benefits of quitting were discussed. Smoking cessation materials and alternatives were given to the patient. Willingness to quit expressed.        3. Chronic cough  Respiratory Culture - Sputum, Cough    Fungus Culture - Sputum, Cough    AFB Culture - Sputum, Cough    I was luis with her that smoking cessation is likely the only intervention to improve her cough.         4. Pulmonary hypertension  Adult Transthoracic Echo Complete W/ Cont if Necessary Per Protocol  Prior to Cardiologist appt in August.            Follow up: 1 month for med change. Consider overnight oximetry.     I spent 45 minutes caring for Jenn on this date of service. This time includes time spent by me in the following activities: preparing for the visit, reviewing tests, obtaining and/or reviewing a separately obtained history, performing a medically appropriate examination and/or evaluation, counseling and educating the patient/family/caregiver, ordering medications, tests, or procedures, referring and communicating with other health care professionals, documenting information in the medical record, independently interpreting results and communicating that information with the patient/family/caregiver, and care coordination            This document has been electronically signed by RAJESH Moya on June 14, 2023 11:20 CDT

## 2023-06-15 ENCOUNTER — TELEPHONE (OUTPATIENT)
Dept: FAMILY MEDICINE CLINIC | Facility: CLINIC | Age: 68
End: 2023-06-15
Payer: MEDICARE

## 2023-06-15 ENCOUNTER — LAB (OUTPATIENT)
Dept: LAB | Facility: HOSPITAL | Age: 68
End: 2023-06-15
Payer: MEDICARE

## 2023-06-15 DIAGNOSIS — M81.0 AGE-RELATED OSTEOPOROSIS WITHOUT CURRENT PATHOLOGICAL FRACTURE: ICD-10-CM

## 2023-06-15 DIAGNOSIS — Z12.31 SCREENING MAMMOGRAM, ENCOUNTER FOR: ICD-10-CM

## 2023-06-15 DIAGNOSIS — R05.3 CHRONIC COUGH: ICD-10-CM

## 2023-06-15 LAB — KOH PREP NAIL: NORMAL

## 2023-06-15 PROCEDURE — 87102 FUNGUS ISOLATION CULTURE: CPT

## 2023-06-15 PROCEDURE — 87206 SMEAR FLUORESCENT/ACID STAI: CPT

## 2023-06-15 PROCEDURE — 87205 SMEAR GRAM STAIN: CPT

## 2023-06-15 PROCEDURE — 87070 CULTURE OTHR SPECIMN AEROBIC: CPT

## 2023-06-15 PROCEDURE — 87220 TISSUE EXAM FOR FUNGI: CPT

## 2023-06-15 PROCEDURE — 87116 MYCOBACTERIA CULTURE: CPT

## 2023-06-15 NOTE — TELEPHONE ENCOUNTER
----- Message from Diego Gusman MD sent at 6/13/2023  8:52 AM CDT -----  Regarding: mammogram and DEXA scan  Mammogram screening on 6/8/23 shows no malignancy. Has dense breast tissue that lowers sensitivity    DEXA scan on 6/8/23 continues to show osteoporosis. Recommend pt go to bone clinic in [place] for treatment options such as prolia injections    Recheck on next visit thanks

## 2023-06-16 LAB — NIGHT BLUE STAIN TISS: NORMAL

## 2023-06-17 LAB
BACTERIA SPEC RESP CULT: NORMAL
GRAM STN SPEC: NORMAL

## 2023-06-21 PROBLEM — F17.209 NICOTINE DEPENDENCE WITH NICOTINE-INDUCED DISORDER: Status: ACTIVE | Noted: 2023-06-21

## 2023-06-21 PROBLEM — R05.3 CHRONIC COUGH: Status: ACTIVE | Noted: 2023-06-21

## 2023-06-22 LAB
FUNGUS WND CULT: NORMAL
MYCOBACTERIUM SPEC CULT: NORMAL
NIGHT BLUE STAIN TISS: NORMAL

## 2023-06-29 LAB
FUNGUS WND CULT: NORMAL
MYCOBACTERIUM SPEC CULT: NORMAL
NIGHT BLUE STAIN TISS: NORMAL

## 2023-07-06 PROBLEM — R19.5 POSITIVE COLORECTAL CANCER SCREENING USING COLOGUARD TEST: Status: ACTIVE | Noted: 2023-07-06

## 2023-07-06 LAB
FUNGUS WND CULT: NORMAL
MYCOBACTERIUM SPEC CULT: NORMAL
NIGHT BLUE STAIN TISS: NORMAL

## 2023-07-13 LAB
FUNGUS WND CULT: NORMAL
MYCOBACTERIUM SPEC CULT: NORMAL
NIGHT BLUE STAIN TISS: NORMAL

## 2023-07-18 PROBLEM — I27.20 PULMONARY HYPERTENSION: Status: RESOLVED | Noted: 2023-05-14 | Resolved: 2023-07-18

## 2023-07-27 LAB
MYCOBACTERIUM SPEC CULT: NORMAL
NIGHT BLUE STAIN TISS: NORMAL

## 2023-08-01 ENCOUNTER — OFFICE VISIT (OUTPATIENT)
Dept: CARDIOLOGY | Facility: CLINIC | Age: 68
End: 2023-08-01
Payer: MEDICARE

## 2023-08-01 VITALS
BODY MASS INDEX: 17.44 KG/M2 | DIASTOLIC BLOOD PRESSURE: 80 MMHG | HEIGHT: 62 IN | WEIGHT: 94.8 LBS | OXYGEN SATURATION: 96 % | SYSTOLIC BLOOD PRESSURE: 100 MMHG | HEART RATE: 88 BPM

## 2023-08-01 DIAGNOSIS — I25.10 CORONARY ARTERY CALCIFICATION: Primary | ICD-10-CM

## 2023-08-01 DIAGNOSIS — Z72.0 TOBACCO USE: ICD-10-CM

## 2023-08-01 DIAGNOSIS — R07.2 PRECORDIAL PAIN: ICD-10-CM

## 2023-08-01 DIAGNOSIS — I10 PRIMARY HYPERTENSION: ICD-10-CM

## 2023-08-01 DIAGNOSIS — I25.84 CORONARY ARTERY CALCIFICATION: Primary | ICD-10-CM

## 2023-08-01 PROCEDURE — 3074F SYST BP LT 130 MM HG: CPT | Performed by: INTERNAL MEDICINE

## 2023-08-01 PROCEDURE — 1159F MED LIST DOCD IN RCRD: CPT | Performed by: INTERNAL MEDICINE

## 2023-08-01 PROCEDURE — 1160F RVW MEDS BY RX/DR IN RCRD: CPT | Performed by: INTERNAL MEDICINE

## 2023-08-01 PROCEDURE — 99204 OFFICE O/P NEW MOD 45 MIN: CPT | Performed by: INTERNAL MEDICINE

## 2023-08-01 PROCEDURE — 3079F DIAST BP 80-89 MM HG: CPT | Performed by: INTERNAL MEDICINE

## 2023-08-08 ENCOUNTER — HOSPITAL ENCOUNTER (OUTPATIENT)
Dept: NUCLEAR MEDICINE | Facility: HOSPITAL | Age: 68
Discharge: HOME | End: 2023-08-08
Payer: MEDICARE

## 2023-08-08 DIAGNOSIS — I25.84 CORONARY ARTERY CALCIFICATION: ICD-10-CM

## 2023-08-08 DIAGNOSIS — R07.2 PRECORDIAL PAIN: ICD-10-CM

## 2023-08-08 DIAGNOSIS — I25.10 CORONARY ARTERY CALCIFICATION: ICD-10-CM

## 2023-08-15 ENCOUNTER — HOSPITAL ENCOUNTER (OUTPATIENT)
Dept: NUCLEAR MEDICINE | Facility: HOSPITAL | Age: 68
Discharge: HOME | End: 2023-08-15
Payer: MEDICARE

## 2023-08-15 ENCOUNTER — HOSPITAL ENCOUNTER (OUTPATIENT)
Dept: CARDIOLOGY | Facility: HOSPITAL | Age: 68
Discharge: HOME | End: 2023-08-15
Payer: MEDICARE

## 2023-08-15 PROCEDURE — A9500 TC99M SESTAMIBI: HCPCS | Performed by: INTERNAL MEDICINE

## 2023-08-15 PROCEDURE — 0 TECHNETIUM SESTAMIBI: Performed by: INTERNAL MEDICINE

## 2023-08-15 PROCEDURE — 93017 CV STRESS TEST TRACING ONLY: CPT

## 2023-08-15 PROCEDURE — 78452 HT MUSCLE IMAGE SPECT MULT: CPT

## 2023-08-15 RX ADMIN — TECHNETIUM TC 99M SESTAMIBI 1 DOSE: 1 INJECTION INTRAVENOUS at 08:52

## 2023-08-15 RX ADMIN — TECHNETIUM TC 99M SESTAMIBI 1 DOSE: 1 INJECTION INTRAVENOUS at 07:46

## 2023-08-16 ENCOUNTER — TELEPHONE (OUTPATIENT)
Dept: CARDIOLOGY | Facility: CLINIC | Age: 68
End: 2023-08-16
Payer: MEDICARE

## 2023-08-16 LAB
BH CV REST NUCLEAR ISOTOPE DOSE: 10.9 MCI
BH CV STRESS BP STAGE 1: NORMAL
BH CV STRESS DURATION MIN STAGE 1: 3
BH CV STRESS DURATION SEC STAGE 1: 0
BH CV STRESS GRADE STAGE 1: 10
BH CV STRESS HR STAGE 1: 142
BH CV STRESS METS STAGE 1: 5
BH CV STRESS NUCLEAR ISOTOPE DOSE: 37.6 MCI
BH CV STRESS PROTOCOL 1: NORMAL
BH CV STRESS RECOVERY BP: NORMAL MMHG
BH CV STRESS RECOVERY HR: 105 BPM
BH CV STRESS SPEED STAGE 1: 1.7
BH CV STRESS STAGE 1: 1
LV EF NUC BP: 86 %
MAXIMAL PREDICTED HEART RATE: 152 BPM
PERCENT MAX PREDICTED HR: 94.74 %
STRESS BASELINE BP: NORMAL MMHG
STRESS BASELINE HR: 101 BPM
STRESS PERCENT HR: 111 %
STRESS POST ESTIMATED WORKLOAD: 4.6 METS
STRESS POST EXERCISE DUR MIN: 2 MIN
STRESS POST EXERCISE DUR SEC: 39 SEC
STRESS POST PEAK BP: NORMAL MMHG
STRESS POST PEAK HR: 144 BPM
STRESS TARGET HR: 129 BPM

## 2023-08-16 NOTE — TELEPHONE ENCOUNTER
Attempted to call the pt. Went straight to .        ----- Message from Braxton Kurtz MD sent at 8/16/2023 10:46 AM CDT -----  Exercise nuclear stress test suggested low risk findings.  Continue current medications.

## 2023-08-17 ENCOUNTER — TELEPHONE (OUTPATIENT)
Dept: CARDIOLOGY | Facility: CLINIC | Age: 68
End: 2023-08-17
Payer: MEDICARE

## 2023-08-17 NOTE — TELEPHONE ENCOUNTER
Attempted to call pt not available at this.     ----- Message from Braxton Kurtz MD sent at 8/16/2023 10:46 AM CDT -----  Exercise nuclear stress test suggested low risk findings.  Continue current medications.

## 2023-08-23 ENCOUNTER — LAB (OUTPATIENT)
Dept: LAB | Facility: HOSPITAL | Age: 68
End: 2023-08-23
Payer: MEDICARE

## 2023-08-23 PROCEDURE — 84080 ASSAY ALKALINE PHOSPHATASES: CPT | Performed by: PHYSICIAN ASSISTANT

## 2023-08-23 PROCEDURE — 84075 ASSAY ALKALINE PHOSPHATASE: CPT | Performed by: PHYSICIAN ASSISTANT

## 2023-08-24 ENCOUNTER — ANESTHESIA EVENT (OUTPATIENT)
Dept: GASTROENTEROLOGY | Facility: HOSPITAL | Age: 68
End: 2023-08-24
Payer: MEDICARE

## 2023-08-24 ENCOUNTER — TELEPHONE (OUTPATIENT)
Dept: CARDIOLOGY | Facility: CLINIC | Age: 68
End: 2023-08-24
Payer: MEDICARE

## 2023-08-24 NOTE — TELEPHONE ENCOUNTER
Contacted pt over results per Dr. Kurtz Exercise nuclear stress test suggested low risk findings. Continue current medications.     Voiced her understanding.   ----- Message from Cady Graff sent at 8/23/2023  8:14 AM CDT -----  Regarding: callback  Contact: [phone]  She was returning your call. Callback number is [phone]. Thanks

## 2023-08-25 ENCOUNTER — HOSPITAL ENCOUNTER (OUTPATIENT)
Facility: HOSPITAL | Age: 68
Setting detail: HOSPITAL OUTPATIENT SURGERY
Discharge: HOME | End: 2023-08-25
Attending: INTERNAL MEDICINE | Admitting: INTERNAL MEDICINE
Payer: MEDICARE

## 2023-08-25 ENCOUNTER — ANESTHESIA (OUTPATIENT)
Dept: GASTROENTEROLOGY | Facility: HOSPITAL | Age: 68
End: 2023-08-25
Payer: MEDICARE

## 2023-08-25 VITALS
RESPIRATION RATE: 18 BRPM | DIASTOLIC BLOOD PRESSURE: 49 MMHG | HEART RATE: 81 BPM | HEIGHT: 62 IN | OXYGEN SATURATION: 94 % | WEIGHT: 91 LBS | TEMPERATURE: 97.4 F | SYSTOLIC BLOOD PRESSURE: 106 MMHG | BODY MASS INDEX: 16.75 KG/M2

## 2023-08-25 DIAGNOSIS — R19.5 POSITIVE COLORECTAL CANCER SCREENING USING COLOGUARD TEST: ICD-10-CM

## 2023-08-25 PROCEDURE — 25010000002 PROPOFOL 10 MG/ML EMULSION: Performed by: NURSE ANESTHETIST, CERTIFIED REGISTERED

## 2023-08-25 PROCEDURE — G0121 COLON CA SCRN NOT HI RSK IND: HCPCS | Performed by: INTERNAL MEDICINE

## 2023-08-25 PROCEDURE — 25010000002 GLUCAGON (HUMAN RECOMBINANT) 1 MG RECONSTITUTED SOLUTION: Performed by: NURSE ANESTHETIST, CERTIFIED REGISTERED

## 2023-08-25 RX ORDER — LIDOCAINE HYDROCHLORIDE 20 MG/ML
INJECTION, SOLUTION INFILTRATION; PERINEURAL AS NEEDED
Status: DISCONTINUED | OUTPATIENT
Start: 2023-08-25 | End: 2023-08-25 | Stop reason: SURG

## 2023-08-25 RX ORDER — PROPOFOL 10 MG/ML
VIAL (ML) INTRAVENOUS AS NEEDED
Status: DISCONTINUED | OUTPATIENT
Start: 2023-08-25 | End: 2023-08-25 | Stop reason: SURG

## 2023-08-25 RX ORDER — DEXTROSE AND SODIUM CHLORIDE 5; .45 G/100ML; G/100ML
30 INJECTION, SOLUTION INTRAVENOUS CONTINUOUS PRN
Status: DISCONTINUED | OUTPATIENT
Start: 2023-08-25 | End: 2023-08-25 | Stop reason: HOSPADM

## 2023-08-25 RX ADMIN — DEXTROSE AND SODIUM CHLORIDE 30 ML/HR: 5; 450 INJECTION, SOLUTION INTRAVENOUS at 10:10

## 2023-08-25 RX ADMIN — PROPOFOL 20 MG: 10 INJECTION, EMULSION INTRAVENOUS at 10:56

## 2023-08-25 RX ADMIN — GLUCAGON HYDROCHLORIDE 0.5 MG: KIT at 11:01

## 2023-08-25 RX ADMIN — LIDOCAINE HYDROCHLORIDE 50 MG: 20 INJECTION, SOLUTION INFILTRATION; PERINEURAL at 10:54

## 2023-08-25 RX ADMIN — PROPOFOL 20 MG: 10 INJECTION, EMULSION INTRAVENOUS at 11:03

## 2023-08-25 RX ADMIN — PROPOFOL 20 MG: 10 INJECTION, EMULSION INTRAVENOUS at 10:59

## 2023-08-25 RX ADMIN — PROPOFOL 50 MG: 10 INJECTION, EMULSION INTRAVENOUS at 10:54

## 2023-08-25 NOTE — ANESTHESIA PREPROCEDURE EVALUATION
Anesthesia Evaluation     Patient summary reviewed and Nursing notes reviewed   NPO Solid Status: > 8 hours  NPO Liquid Status: > 4 hours           Airway   Mallampati: II  No difficulty expected  Dental    (+) poor dentition    Pulmonary - normal exam   (+) pneumonia resolved , a smoker Current Abstained day of surgery, COPD mild,  Cardiovascular     Rhythm: regular  Rate: normal    (+) hypertension well controlled, CAD, hyperlipidemia      Neuro/Psych  (+) headaches  GI/Hepatic/Renal/Endo    (+) renal disease CRI    Musculoskeletal     Abdominal    Substance History      OB/GYN          Other   arthritis,                     Anesthesia Plan    ASA 3     general   total IV anesthesia  intravenous induction     Anesthetic plan, risks, benefits, and alternatives have been provided, discussed and informed consent has been obtained with: patient.    Plan discussed with CRNA.      CODE STATUS:

## 2023-08-25 NOTE — H&P
Chief Complaint   Patient presents with    Heartburn    positive colorguard     Colon Cancer Screening       ENDO PROCEDURE ORDERED: COLON +cologuard    Yosvany Lee is a 68 y.o. female. she is being seen for consultation today at the request of Herb Esquivel MD    History of Present Illness I agree with the current note with no changes in the history.  Risks and benefits discussed with patient. Patient understands these and would like to proceed with procedure.      This 68-year-old retired female was sent for evaluation for positive Cologuard, GERD by Dr. Esquivel who saw the patient on 2023. She had had a positive Cologuard on 2020 but did not want to do a colonoscopy because of the prep. She had last saw Dr. Gusman on 2023. She is now willing to have the colonoscopy. She has had longstanding constipation. Complains of 6 out of 10 back pain. Denied nausea, vomiting or dysphagia. She has not seen any blood in her stool. Weight is down a few pounds recently but has been generally stable. She has never had an EGD. Last colonoscopy showed hemorrhoids on 2010.    Patient has been seeing Pulmonology. Had laboratory on 06/15/2023. Sputum sample showed normal eva, negative fungal culture, negative KOH, AFB. Laboratory on 2023, CMP showed an alkaline phosphatase 206, otherwise normal. CBC showed a white count of 18.61, hemoglobin 14.0, platelets 348,000. She had a CT scan of the chest on that date that showed pulmonary nodules resolved, possible mycobacterium. She is again to have follow-up with Pulmonology.     Patient is a half pack a day smoker for 4 years, strongly encouraged to quit. Denies alcohol or illicit substance use. She has a history of asthma, family history of diabetes, heart disease, cancer, hypertension. Father, mother, spouse all .     ASSESSMENT/PLAN:  Patient with positive Cologuard, elevation in alkaline phosphatase of questionable significance. May  be related to her pulmonary disease but certainly an occult lesion is of great concern. I have recommended colonoscopy. We will add alkaline phosphatase, isoenzymes to her next laboratories. I have encouraged her to have this study done. If she has any questions we will do whatever we can to help her get through it. We will plan follow-up after the above. Further pending clinical course and the results of the above.    Thank you very much, Dr. Esquivel, for this consultation and for allowing us to participate in the care of your patient. We will keep you informed.         The following portions of the patient's history were reviewed and updated as appropriate:   Past Medical History:   Diagnosis Date    Ankle edema     Chronic bronchitis     Migraine     Pain in right knee     nondisplaced facture of the patella      Peroneal tendinitis     Sprain of ankle      Past Surgical History:   Procedure Laterality Date    HYSTERECTOMY      TONSILLECTOMY       Family History   Problem Relation Age of Onset    Alcohol abuse Other     Asthma Other     Breast cancer Other     Diabetes Other     Glaucoma Other     Heart disease Other     Hypertension Other     Stroke Other      OB History    No obstetric history on file.       Allergies   Allergen Reactions    Lisinopril Cough     Social History     Socioeconomic History    Marital status:    Tobacco Use    Smoking status: Every Day     Packs/day: 0.50     Years: 48.00     Pack years: 24.00     Types: Cigarettes     Start date: 1975     Passive exposure: Current    Smokeless tobacco: Never   Vaping Use    Vaping Use: Never used   Substance and Sexual Activity    Alcohol use: Yes     Comment: social    Drug use: No    Sexual activity: Defer     Current Medications:  Prior to Admission medications    Medication Sig Start Date End Date Taking? Authorizing Provider   albuterol (ACCUNEB) 1.25 MG/3ML nebulizer solution Take 3 mL by nebulization Every 6 (Six) Hours As Needed for  "Wheezing or Shortness of Air. 2/16/23  Yes Diego Gusman MD   albuterol sulfate  (90 Base) MCG/ACT inhaler Inhale 2 puffs Every 4 (Four) Hours As Needed for Wheezing or Shortness of Air. 2/16/23  Yes Diego Gusman MD   amitriptyline (ELAVIL) 25 MG tablet Take 2 tablets by mouth Every Night. 2/16/23  Yes Diego Gusman MD   amLODIPine (NORVASC) 10 MG tablet Take 1 tablet by mouth Daily. 2/16/23  Yes Diego Gusman MD   aspirin 81 MG EC tablet Take 1 tablet by mouth Daily. 5/17/23  Yes Diego Gusman MD   fluticasone (FLONASE) 50 MCG/ACT nasal spray 1 spray into the nostril(s) as directed by provider Daily. 2/16/23  Yes Diego Gusman MD   Fluticasone-Umeclidin-Vilant (Trelegy Ellipta) 100-62.5-25 MCG/ACT inhaler Inhale 1 puff Daily. 6/14/23  Yes Ana Cristina Butler APRN   montelukast (SINGULAIR) 10 MG tablet Take 1 tablet by mouth every night at bedtime. 2/16/23  Yes Diego Gusman MD   Nebulizer device 1 application 4 (Four) Times a Day. 5/26/22  Yes Diego Gusman MD   propranolol (INDERAL) 40 MG tablet Take 1 tablet by mouth Daily. 2/16/23  Yes Diego Gusman MD   simvastatin (ZOCOR) 20 MG tablet TAKE ONE TABLET BY MOUTH EVERY NIGHT 6/2/23  Yes Diego Gusman MD     Review of Systems  Review of Systems   Constitutional:  Negative for unexpected weight change.   HENT:  Negative for trouble swallowing.    Gastrointestinal:  Positive for abdominal pain.        Objective    /65   Pulse 86   Ht 157.5 cm (62\")   Wt 42 kg (92 lb 9.6 oz)   BMI 16.94 kg/mý   Physical Exam  Vitals and nursing note reviewed.   Constitutional:       General: She is not in acute distress.     Appearance: She is well-developed. She is ill-appearing.   HENT:      Head: Normocephalic and atraumatic.   Eyes:      Pupils: Pupils are equal, round, and reactive to light.   Cardiovascular:      Rate and Rhythm: Normal rate and regular rhythm.      Heart sounds: Normal heart sounds.   Pulmonary:      Effort: " Pulmonary effort is normal.      Breath sounds: Normal breath sounds.   Abdominal:      General: Bowel sounds are normal. There is no distension or abdominal bruit.      Palpations: Abdomen is soft. Abdomen is not rigid. There is no shifting dullness or mass.      Tenderness: There is abdominal tenderness. There is no guarding or rebound.      Hernia: No hernia is present. There is no hernia in the ventral area.   Musculoskeletal:         General: Normal range of motion.      Cervical back: Normal range of motion.   Skin:     General: Skin is warm and dry.   Neurological:      Mental Status: She is alert and oriented to person, place, and time.   Psychiatric:         Behavior: Behavior normal.         Thought Content: Thought content normal.         Judgment: Judgment normal.     Assessment & Plan      1. Positive colorectal cancer screening using Cologuard test    2. Alkaline phosphatase elevation    .   Diagnoses and all orders for this visit:    1. Positive colorectal cancer screening using Cologuard test (Primary)  -     Case Request; Standing  -     dextrose 5 % and sodium chloride 0.45 % infusion  -     Case Request    2. Alkaline phosphatase elevation  -     Alkaline Phosphatase, Isoenzymes    Other orders  -     Obtain Informed Consent; Standing  -     Follow Anesthesia Guidelines / Protocol; Future  -     Obtain Informed Consent; Future  -     POC Glucose Once; Standing        Orders placed during this encounter include:  Orders Placed This Encounter   Procedures    Alkaline Phosphatase, Isoenzymes     Order Specific Question:   Release to patient     Answer:   Routine Release    Obtain Informed Consent     Standing Status:   Future     Order Specific Question:   Informed Consent Given For     Answer:   COLONOSCOPY       Medications prescribed:  No orders of the defined types were placed in this encounter.      Requested Prescriptions      No prescriptions requested or ordered in this encounter       Review  and/or summary of lab tests, radiology, procedures, medications. Review and summary of old records and obtaining of history. The risks and benefits of my recommendations, as well as other treatment options were discussed with the patient today. Questions were answered.    Follow-up: Return if symptoms worsen or fail to improve, for After the above.     COLONOSCOPY (N/A)      This document has been electronically signed by Diego Miguel PA-C on July 20, 2023 19:40 CDT      Results for orders placed or performed in visit on 06/19/23   CBC Auto Differential    Specimen: Blood   Result Value Ref Range    WBC 18.61 (H) 3.40 - 10.80 10*3/mm3    RBC 4.43 3.77 - 5.28 10*6/mm3    Hemoglobin 14.0 12.0 - 15.9 g/dL    Hematocrit 42.6 34.0 - 46.6 %    MCV 96.2 79.0 - 97.0 fL    MCH 31.6 26.6 - 33.0 pg    MCHC 32.9 31.5 - 35.7 g/dL    RDW 12.5 12.3 - 15.4 %    RDW-SD 45.0 37.0 - 54.0 fl    MPV 9.9 6.0 - 12.0 fL    Platelets 348 140 - 450 10*3/mm3   Manual Differential    Specimen: Blood   Result Value Ref Range    Neutrophil % 68.0 42.7 - 76.0 %    Lymphocyte % 25.0 19.6 - 45.3 %    Monocyte % 3.0 (L) 5.0 - 12.0 %    Eosinophil % 2.0 0.3 - 6.2 %    Basophil % 1.0 0.0 - 1.5 %    Atypical Lymphocyte % 1.0 0.0 - 5.0 %    Neutrophils Absolute 12.65 (H) 1.70 - 7.00 10*3/mm3    Lymphocytes Absolute 4.84 (H) 0.70 - 3.10 10*3/mm3    Monocytes Absolute 0.56 0.10 - 0.90 10*3/mm3    Eosinophils Absolute 0.37 0.00 - 0.40 10*3/mm3    Basophils Absolute 0.19 0.00 - 0.20 10*3/mm3    RBC Morphology Normal Normal    WBC Morphology Normal Normal    Platelet Estimate Adequate Normal   Comprehensive Metabolic Panel    Specimen: Blood   Result Value Ref Range    Glucose 92 65 - 99 mg/dL    BUN 10 8 - 23 mg/dL    Creatinine 0.73 0.57 - 1.00 mg/dL    Sodium 138 136 - 145 mmol/L    Potassium 4.4 3.5 - 5.2 mmol/L    Chloride 101 98 - 107 mmol/L    CO2 26.0 22.0 - 29.0 mmol/L    Calcium 10.0 8.6 - 10.5 mg/dL    Total Protein 7.2 6.0 - 8.5 g/dL     Albumin 4.0 3.5 - 5.2 g/dL    ALT (SGPT) 11 1 - 33 U/L    AST (SGOT) 13 1 - 32 U/L    Alkaline Phosphatase 206 (H) 39 - 117 U/L    Total Bilirubin 0.2 0.0 - 1.2 mg/dL    Globulin 3.2 gm/dL    A/G Ratio 1.3 g/dL    BUN/Creatinine Ratio 13.7 7.0 - 25.0    Anion Gap 11.0 5.0 - 15.0 mmol/L    eGFR 89.7 >60.0 mL/min/1.73   Results for orders placed or performed in visit on 06/15/23   Respiratory Culture - Sputum, Cough    Specimen: Cough; Sputum   Result Value Ref Range    Respiratory Culture       Heavy growth (4+) Normal respiratory eva. No S. aureus or Pseudomonas aeruginosa detected. Final report.    Gram Stain Many (4+) WBCs per low power field     Gram Stain Few (2+) Epithelial cells per low power field     Gram Stain Many (4+) Gram negative bacilli, tiny     Gram Stain Mixed bacterial eva    AFB Culture - Sputum, Cough    Specimen: Cough; Sputum   Result Value Ref Range    AFB Culture No AFB isolated at 5 weeks     AFB Stain No acid fast bacilli seen on concentrated smear    KOH Prep - Sputum, Cough    Specimen: Cough; Sputum   Result Value Ref Range    KOH Prep No yeast or hyphal elements seen No yeast or hyphal elements seen   Fungus Culture - Sputum, Cough    Specimen: Cough; Sputum   Result Value Ref Range    Fungus Culture No fungus isolated at 4 weeks    Results for orders placed or performed in visit on 05/17/23   CBC Auto Differential    Specimen: Blood   Result Value Ref Range    WBC 12.21 (H) 3.40 - 10.80 10*3/mm3    RBC 4.51 3.77 - 5.28 10*6/mm3    Hemoglobin 14.6 12.0 - 15.9 g/dL    Hematocrit 42.1 34.0 - 46.6 %    MCV 93.3 79.0 - 97.0 fL    MCH 32.4 26.6 - 33.0 pg    MCHC 34.7 31.5 - 35.7 g/dL    RDW 12.1 (L) 12.3 - 15.4 %    RDW-SD 41.4 37.0 - 54.0 fl    MPV 11.0 6.0 - 12.0 fL    Platelets 346 140 - 450 10*3/mm3    Neutrophil % 68.8 42.7 - 76.0 %    Lymphocyte % 22.4 19.6 - 45.3 %    Monocyte % 6.5 5.0 - 12.0 %    Eosinophil % 1.5 0.3 - 6.2 %    Basophil % 0.5 0.0 - 1.5 %    Immature Grans % 0.3  0.0 - 0.5 %    Neutrophils, Absolute 8.40 (H) 1.70 - 7.00 10*3/mm3    Lymphocytes, Absolute 2.74 0.70 - 3.10 10*3/mm3    Monocytes, Absolute 0.79 0.10 - 0.90 10*3/mm3    Eosinophils, Absolute 0.18 0.00 - 0.40 10*3/mm3    Basophils, Absolute 0.06 0.00 - 0.20 10*3/mm3    Immature Grans, Absolute 0.04 0.00 - 0.05 10*3/mm3    nRBC 0.0 0.0 - 0.2 /100 WBC   High Sensitivity Troponin T    Specimen: Blood   Result Value Ref Range    HS Troponin T 7 <10 ng/L   Vitamin D,25-Hydroxy    Specimen: Blood   Result Value Ref Range    25 Hydroxy, Vitamin D 20.1 (L) 30.0 - 100.0 ng/ml   BNP    Specimen: Blood   Result Value Ref Range    proBNP 92.8 0.0 - 900.0 pg/mL   Lipid Panel    Specimen: Blood   Result Value Ref Range    Total Cholesterol 153 0 - 200 mg/dL    Triglycerides 129 0 - 150 mg/dL    HDL Cholesterol 46 40 - 60 mg/dL    LDL Cholesterol  84 0 - 100 mg/dL    VLDL Cholesterol 23 5 - 40 mg/dL    LDL/HDL Ratio 1.77    Comprehensive Metabolic Panel    Specimen: Blood   Result Value Ref Range    Glucose 97 65 - 99 mg/dL    BUN 9 8 - 23 mg/dL    Creatinine 0.70 0.57 - 1.00 mg/dL    Sodium 138 136 - 145 mmol/L    Potassium 3.8 3.5 - 5.2 mmol/L    Chloride 100 98 - 107 mmol/L    CO2 28.0 22.0 - 29.0 mmol/L    Calcium 9.6 8.6 - 10.5 mg/dL    Total Protein 7.0 6.0 - 8.5 g/dL    Albumin 4.1 3.5 - 5.2 g/dL    ALT (SGPT) 10 1 - 33 U/L    AST (SGOT) 15 1 - 32 U/L    Alkaline Phosphatase 185 (H) 39 - 117 U/L    Total Bilirubin 0.2 0.0 - 1.2 mg/dL    Globulin 2.9 gm/dL    A/G Ratio 1.4 g/dL    BUN/Creatinine Ratio 12.9 7.0 - 25.0    Anion Gap 10.0 5.0 - 15.0 mmol/L    eGFR 94.3 >60.0 mL/min/1.73     *Note: Due to a large number of results and/or encounters for the requested time period, some results have not been displayed. A complete set of results can be found in Results Review.

## 2023-08-25 NOTE — ANESTHESIA POSTPROCEDURE EVALUATION
Patient: Jenn Lee    Procedure Summary       Date: 08/25/23 Room / Location: [facility] ENDOSCOPY 1 / [facility] ENDOSCOPY    Anesthesia Start: 1034 Anesthesia Stop: 1112    Procedure: COLONOSCOPY Diagnosis:       Positive colorectal cancer screening using Cologuard test      (Positive colorectal cancer screening using Cologuard test [R19.5])    Surgeons: Sachin Nathan MD Provider: Julian Cline CRNA    Anesthesia Type: general ASA Status: 3            Anesthesia Type: general    Vitals  No vitals data found for the desired time range.          Post Anesthesia Care and Evaluation    Patient location during evaluation: bedside  Patient participation: complete - patient participated  Level of consciousness: sleepy but conscious  Pain score: 0  Pain management: adequate    Airway patency: patent  Anesthetic complications: No anesthetic complications  PONV Status: none  Cardiovascular status: acceptable  Respiratory status: acceptable  Hydration status: acceptable    Comments: ---------------------------               08/25/23                      1112        ---------------------------   BP:          101/71         Pulse:         83           Resp:          18           Temp:   97.4 øF (36.3 øC)   SpO2:          96%         ---------------------------

## 2023-08-29 RX ORDER — AMLODIPINE BESYLATE 10 MG/1
TABLET ORAL
Qty: 90 TABLET | Refills: 1 | Status: SHIPPED | OUTPATIENT
Start: 2023-08-29

## 2023-08-29 RX ORDER — AMITRIPTYLINE HYDROCHLORIDE 25 MG/1
TABLET, FILM COATED ORAL
Qty: 180 TABLET | Refills: 1 | Status: SHIPPED | OUTPATIENT
Start: 2023-08-29

## 2023-09-01 RX ORDER — PROPRANOLOL HYDROCHLORIDE 40 MG/1
40 TABLET ORAL DAILY
Qty: 90 TABLET | Refills: 1 | Status: SHIPPED | OUTPATIENT
Start: 2023-09-01

## 2023-09-11 ENCOUNTER — LAB (OUTPATIENT)
Dept: LAB | Facility: HOSPITAL | Age: 68
End: 2023-09-11
Payer: MEDICARE

## 2023-09-11 DIAGNOSIS — R91.1 LUNG NODULE: ICD-10-CM

## 2023-09-11 DIAGNOSIS — R19.5 POSITIVE COLORECTAL CANCER SCREENING USING COLOGUARD TEST: ICD-10-CM

## 2023-09-11 DIAGNOSIS — Z85.820 HISTORY OF MELANOMA: ICD-10-CM

## 2023-09-11 DIAGNOSIS — D72.828 OTHER ELEVATED WHITE BLOOD CELL (WBC) COUNT: ICD-10-CM

## 2023-09-11 DIAGNOSIS — Z86.718 HISTORY OF DVT (DEEP VEIN THROMBOSIS): ICD-10-CM

## 2023-09-11 PROCEDURE — 85025 COMPLETE CBC W/AUTO DIFF WBC: CPT

## 2023-09-11 PROCEDURE — 80053 COMPREHEN METABOLIC PANEL: CPT

## 2023-09-12 LAB
ALBUMIN SERPL-MCNC: 3.8 G/DL (ref 3.5–5.2)
ALBUMIN/GLOB SERPL: 1.1 G/DL
ALP SERPL-CCNC: 171 U/L (ref 39–117)
ALT SERPL W P-5'-P-CCNC: 9 U/L (ref 1–33)
ANION GAP SERPL CALCULATED.3IONS-SCNC: 12 MMOL/L (ref 5–15)
AST SERPL-CCNC: 13 U/L (ref 1–32)
BASOPHILS # BLD AUTO: 0.1 10*3/MM3 (ref 0–0.2)
BASOPHILS NFR BLD AUTO: 0.7 % (ref 0–1.5)
BILIRUB SERPL-MCNC: 0.2 MG/DL (ref 0–1.2)
BUN SERPL-MCNC: 11 MG/DL (ref 8–23)
BUN/CREAT SERPL: 13.8 (ref 7–25)
CALCIUM SPEC-SCNC: 9.5 MG/DL (ref 8.6–10.5)
CHLORIDE SERPL-SCNC: 98 MMOL/L (ref 98–107)
CO2 SERPL-SCNC: 25 MMOL/L (ref 22–29)
CREAT SERPL-MCNC: 0.8 MG/DL (ref 0.57–1)
DEPRECATED RDW RBC AUTO: 45.1 FL (ref 37–54)
EGFRCR SERPLBLD CKD-EPI 2021: 80.4 ML/MIN/1.73
EOSINOPHIL # BLD AUTO: 0.23 10*3/MM3 (ref 0–0.4)
EOSINOPHIL NFR BLD AUTO: 1.6 % (ref 0.3–6.2)
ERYTHROCYTE [DISTWIDTH] IN BLOOD BY AUTOMATED COUNT: 12.4 % (ref 12.3–15.4)
GLOBULIN UR ELPH-MCNC: 3.4 GM/DL
GLUCOSE SERPL-MCNC: 78 MG/DL (ref 65–99)
HCT VFR BLD AUTO: 40 % (ref 34–46.6)
HGB BLD-MCNC: 12.8 G/DL (ref 12–15.9)
IMM GRANULOCYTES # BLD AUTO: 0.1 10*3/MM3 (ref 0–0.05)
IMM GRANULOCYTES NFR BLD AUTO: 0.7 % (ref 0–0.5)
LYMPHOCYTES # BLD AUTO: 3.27 10*3/MM3 (ref 0.7–3.1)
LYMPHOCYTES NFR BLD AUTO: 23.2 % (ref 19.6–45.3)
MCH RBC QN AUTO: 31.4 PG (ref 26.6–33)
MCHC RBC AUTO-ENTMCNC: 32 G/DL (ref 31.5–35.7)
MCV RBC AUTO: 98.3 FL (ref 79–97)
MONOCYTES # BLD AUTO: 0.92 10*3/MM3 (ref 0.1–0.9)
MONOCYTES NFR BLD AUTO: 6.5 % (ref 5–12)
NEUTROPHILS NFR BLD AUTO: 67.3 % (ref 42.7–76)
NEUTROPHILS NFR BLD AUTO: 9.45 10*3/MM3 (ref 1.7–7)
NRBC BLD AUTO-RTO: 0 /100 WBC (ref 0–0.2)
PLATELET # BLD AUTO: 348 10*3/MM3 (ref 140–450)
PMV BLD AUTO: 11.1 FL (ref 6–12)
POTASSIUM SERPL-SCNC: 3.9 MMOL/L (ref 3.5–5.2)
PROT SERPL-MCNC: 7.2 G/DL (ref 6–8.5)
RBC # BLD AUTO: 4.07 10*6/MM3 (ref 3.77–5.28)
SODIUM SERPL-SCNC: 135 MMOL/L (ref 136–145)
WBC NRBC COR # BLD: 14.07 10*3/MM3 (ref 3.4–10.8)

## 2023-09-20 ENCOUNTER — OFFICE VISIT (OUTPATIENT)
Dept: ONCOLOGY | Facility: CLINIC | Age: 68
End: 2023-09-20
Payer: MEDICARE

## 2023-09-20 VITALS
BODY MASS INDEX: 17.26 KG/M2 | WEIGHT: 93.8 LBS | OXYGEN SATURATION: 94 % | HEIGHT: 62 IN | SYSTOLIC BLOOD PRESSURE: 107 MMHG | HEART RATE: 79 BPM | DIASTOLIC BLOOD PRESSURE: 48 MMHG

## 2023-09-20 DIAGNOSIS — Z86.718 HISTORY OF DVT (DEEP VEIN THROMBOSIS): ICD-10-CM

## 2023-09-20 DIAGNOSIS — R19.5 POSITIVE COLORECTAL CANCER SCREENING USING COLOGUARD TEST: ICD-10-CM

## 2023-09-20 DIAGNOSIS — R91.1 LUNG NODULE: ICD-10-CM

## 2023-09-20 DIAGNOSIS — Z85.820 HISTORY OF MELANOMA: ICD-10-CM

## 2023-09-20 DIAGNOSIS — D72.828 OTHER ELEVATED WHITE BLOOD CELL (WBC) COUNT: Primary | ICD-10-CM

## 2023-09-20 PROBLEM — D72.829 LEUKOCYTOSIS: Chronic | Status: ACTIVE | Noted: 2023-05-14

## 2023-09-20 PROCEDURE — G0463 HOSPITAL OUTPT CLINIC VISIT: HCPCS | Performed by: INTERNAL MEDICINE

## 2023-09-20 NOTE — PROGRESS NOTES
"DATE OF VISIT: 9/20/2023      REASON FOR VISIT: Lung nodule, leukocytosis, abnormal Cologuard test      HISTORY OF PRESENT ILLNESS:   68-year-old female with medical problem consisting of 80-pack-year smoking history, hypertension, dyslipidemia, history of hysterectomy at age 21 who was initially seen in consultation on May 24, 2023 for evaluation of suspicious lung nodule, history of DVT, history of melanoma, leukocytosis and abnormal Cologuard test.  Patient is here for follow-up appointment today to discuss recently done blood work.  Had a colonoscopy done in August 2023 but it was incomplete due to suboptimal preparation and 1 year follow-up has been recommended.  Denies any bleeding.  Denies any new lymph node enlargement.  Denies any fevers.                  Past Medical History, Past Surgical History, Social History, Family History have been reviewed and are without significant changes except as mentioned.    Review of Systems   A comprehensive 14 point review of systems was performed and was negative except as mentioned in HPI.    Medications:  The current medication list was reviewed in the EMR    ALLERGIES:    Allergies   Allergen Reactions    Lisinopril Cough       Objective      Vitals:    09/20/23 1406   BP: 107/48   Pulse: 79   SpO2: 94%   Weight: 42.5 kg (93 lb 12.8 oz)   Height: 157.5 cm (62\")   PainSc:   6   PainLoc: Back          No data to display                Physical Exam  Pulmonary:      Breath sounds: Normal breath sounds.   Neurological:      Mental Status: She is alert and oriented to person, place, and time.         RECENT LABS:  Glucose   Date Value Ref Range Status   09/11/2023 78 65 - 99 mg/dL Final     Sodium   Date Value Ref Range Status   09/11/2023 135 (L) 136 - 145 mmol/L Final     Potassium   Date Value Ref Range Status   09/11/2023 3.9 3.5 - 5.2 mmol/L Final     CO2   Date Value Ref Range Status   09/11/2023 25.0 22.0 - 29.0 mmol/L Final     Chloride   Date Value Ref Range " Status   09/11/2023 98 98 - 107 mmol/L Final     Anion Gap   Date Value Ref Range Status   09/11/2023 12.0 5.0 - 15.0 mmol/L Final     Creatinine   Date Value Ref Range Status   09/11/2023 0.80 0.57 - 1.00 mg/dL Final     BUN   Date Value Ref Range Status   09/11/2023 11 8 - 23 mg/dL Final     BUN/Creatinine Ratio   Date Value Ref Range Status   09/11/2023 13.8 7.0 - 25.0 Final     Calcium   Date Value Ref Range Status   09/11/2023 9.5 8.6 - 10.5 mg/dL Final     eGFR Non  Amer   Date Value Ref Range Status   09/28/2021 77 >60 mL/min/1.73 Final     Alkaline Phosphatase   Date Value Ref Range Status   09/11/2023 171 (H) 39 - 117 U/L Final     Total Protein   Date Value Ref Range Status   09/11/2023 7.2 6.0 - 8.5 g/dL Final     ALT (SGPT)   Date Value Ref Range Status   09/11/2023 9 1 - 33 U/L Final     AST (SGOT)   Date Value Ref Range Status   09/11/2023 13 1 - 32 U/L Final     Total Bilirubin   Date Value Ref Range Status   09/11/2023 0.2 0.0 - 1.2 mg/dL Final     Albumin   Date Value Ref Range Status   09/11/2023 3.8 3.5 - 5.2 g/dL Final     Globulin   Date Value Ref Range Status   09/11/2023 3.4 gm/dL Final     Lab Results   Component Value Date    WBC 14.07 (H) 09/11/2023    HGB 12.8 09/11/2023    HCT 40.0 09/11/2023    MCV 98.3 (H) 09/11/2023     09/11/2023     Lab Results   Component Value Date    NEUTROABS 9.45 (H) 09/11/2023    [id number] 397 05/26/2022    [id number] 299 09/28/2021    TCWSPEPD36 569 06/01/2016     No results found for: , LABCA2, AFPTM, HCGQUANT, , CHROMGRNA, 5LLBF45MOV, CEA, REFLABREPO      PATHOLOGY:  * Cannot find OR log *         RADIOLOGY DATA :  No radiology results for the last 7 days        Assessment & Plan     1.  Leukocytosis:  - Patient has chronic leukocytosis which is predominantly increasing neutrophil.  - Recent white blood cell count is 14.07 which is predominantly increasing neutrophil.  - Patient is currently working on cutting back on smoking.  - We  will monitor with CBC for now  - We will have patient return to clinic in 4 months with repeat CBC and CMP on that day.    2.Lung nodule:  - Low-dose CT of chest done at [facility] in March 2023 showed spiculated lung lesion involving left lung field.  - 3-month follow-up CT of chest done on June 19, 2023 did not show any suspicious lung nodule.  Paratracheal lymph node was stable around 10 mm  - Due to patient's 80-pack-year smoking history, plan is to repeat CT of chest with contrast around June 2024.    3.  Abnormal Cologuard test  - Patient had a colonoscopy done on August 25, 2023 by Dr. Nathan for positive Cologuard test.  - Colonoscopy was suboptimal due to poor preparation.  - 1 year follow-up colonoscopy has been recommended by Dr. Nathan.    4.  History of DVT:  - Patient has a history of right lower extremity DVT x2.  Last episode was around 30 to 40 years ago  - We will continue with clinical surveillance without any anticoagulation for now    5.  History of melanoma of left side of face.  - Patient had surgery done more than 30 years ago without any adjuvant chemotherapy or radiation  - We will continue with clinical monitoring for now    6.  Hypertension    7.  Dyslipidemia    8.  Health maintenance: Patient continues to smoke, has been counseled about smoking cessation.    9. Advance Care Planning: For now patient remains full code and is able to make decisions.  Patient has health care surrogate mentioned on chart.                  PHQ-9 Total Score: 0   -Patient is not homicidal or suicidal.  No acute intervention required.     Jenn Lee reports a pain score of 6.  Given her pain assessment as noted, treatment options were discussed and the following options were decided upon as a follow-up plan to address the patient's pain: continuation of current treatment plan for pain.         Herb Esquivel MD  9/20/2023  14:10 CDT        Part of this note may be an electronic  transcription/translation of spoken language to printed text using the Dragon Dictation System.          CC:

## 2023-11-07 DIAGNOSIS — M25.511 RIGHT SHOULDER PAIN, UNSPECIFIED CHRONICITY: Primary | ICD-10-CM
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
## (undated) DEVICE — CANN SMPL SOFTECH BIFLO ETCO2 A/M 7FT